# Patient Record
Sex: FEMALE | Race: WHITE | NOT HISPANIC OR LATINO | Employment: FULL TIME | URBAN - METROPOLITAN AREA
[De-identification: names, ages, dates, MRNs, and addresses within clinical notes are randomized per-mention and may not be internally consistent; named-entity substitution may affect disease eponyms.]

---

## 2017-03-03 ENCOUNTER — OFFICE VISIT (OUTPATIENT)
Dept: URGENT CARE | Facility: URGENT CARE | Age: 52
End: 2017-03-03
Payer: COMMERCIAL

## 2017-03-03 VITALS
SYSTOLIC BLOOD PRESSURE: 132 MMHG | HEART RATE: 88 BPM | TEMPERATURE: 97 F | DIASTOLIC BLOOD PRESSURE: 86 MMHG | OXYGEN SATURATION: 95 %

## 2017-03-03 DIAGNOSIS — M54.5 LEFT LOW BACK PAIN, UNSPECIFIED CHRONICITY, WITH SCIATICA PRESENCE UNSPECIFIED: Primary | ICD-10-CM

## 2017-03-03 PROCEDURE — 99213 OFFICE O/P EST LOW 20 MIN: CPT | Performed by: FAMILY MEDICINE

## 2017-03-03 RX ORDER — OXYCODONE AND ACETAMINOPHEN 5; 325 MG/1; MG/1
1 TABLET ORAL EVERY 6 HOURS PRN
Qty: 30 TABLET | Refills: 0 | Status: SHIPPED | OUTPATIENT
Start: 2017-03-03 | End: 2018-10-05

## 2017-03-03 RX ORDER — KETOROLAC TROMETHAMINE 30 MG/ML
60 INJECTION, SOLUTION INTRAMUSCULAR; INTRAVENOUS ONCE
Qty: 2 ML | Refills: 0 | OUTPATIENT
Start: 2017-03-03 | End: 2017-03-03

## 2017-03-03 RX ORDER — CYCLOBENZAPRINE HCL 10 MG
5-10 TABLET ORAL 2 TIMES DAILY PRN
Qty: 30 TABLET | Refills: 1 | Status: SHIPPED | OUTPATIENT
Start: 2017-03-03 | End: 2018-06-01

## 2017-03-03 NOTE — MR AVS SNAPSHOT
After Visit Summary   3/3/2017    Christina Guzman    MRN: [id number]           Patient Information     Date Of Birth          1965        Visit Information        Provider Department      3/3/2017 8:35 PM Mateo Rodriguez MD [facility]        Today's Diagnoses     Left low back pain, unspecified chronicity, with sciatica presence unspecified    -  1      Care Instructions      Back Care Tips    Caring for your back  These are things you can do to prevent a recurrence of acute back pain and to reduce symptoms from chronic back pain:    Maintain a healthy weight. If you are overweight, losing weight will help most types of back pain.    Exercise is an important part of recovery from most types of back pain. The back is supported by the muscles behind and in front of the spine. This means both the back muscles and the abdominal muscles must be strengthened to provide better support for your spine.     Swimming and brisk walking are good overall exercises to improve your fitness level.    Practice safe lifting methods (below).    Practice good posture when sitting, standing and walking. Avoid prolonged sitting. This puts more stress on the lower back than standing or walking.    Wear quality shoes with sufficient arch support. Foot and ankle alignment can affect back symptoms. Women should avoid high heels.    Therapeutic massage can help  relax the back muscles without stretching them.    During the first 24 to 72 hours after an acute injury or flare-up of chronic back pain, apply an ice pack to the painful area for 20 minutes and then remove it for 20 minutes over a period of 60 to 90 minutes or several times a day. As a safety precaution, do not use a heating pad at bedtime. Sleeping on a heating pad can lead to skin burns or tissue damage.    Ice and heat therapies can be alternated.  Medications  Talk to your doctor before using medications, especially if you have other medical  problems or are taking other medicines.    You may use acetaminophen or ibuprofen to control pain, unless other pain medicine was prescribed. If you have chronic conditions like diabetes, liver or kidney disease, stomach ulcers or gastrointestinal bleeding, or are taking blood thinners, talk with your doctor before taking any meidcations.    Be careful if you are given prescription pain medicines, narcotics, or medication for muscle spasm. They can cause drowsiness, affect your coordination, reflexes and judgment. Do not drive or operate heavy machinery.  Lumbar stretch  Here is a simple stretching exercise that will help relax muscle spasm and keep your back more limber. If exercise makes your back pain worse, don t do it.    Lie on your back with your knees bent and both feet on the ground.    Slowly raise your left knee to your chest as you flatten your lower back against the floor. Hold for 5 seconds.    Relax and repeat the exercise with your right knee.    Do 10 of these exercises for each leg.  Safe lifting method    Don t bend over at the waist to lift an object off the floor.  Instead, bend your knees and hips in a squat.     Keep your back and head upright    Hold the object close to your body, directly in front of you.    Straighten your legs to lift the object.     Lower the object to the floor in the reverse fashion.    If you must slide something across the floor, push it.  Posture tips  Sitting  Sit in chairs with straight backs or low-back support. rKeep your k nees lower than your hip, with your feet flat on the floor.  When driving, sit up straight. Adjust the seat forward so you are not leaning toward the steering wheel.  A small pillow or rolled towel behind your lower back may help if you are driving long distances.   Standing  When standing for long periods, shift most of your weight to one leg at a time. Alternate legs every few minutes.   Sleeping  The best way to sleep is on your side with  your knees bent. Put a low pillow under your head to support your neck in a neutral spine position. Avoid thick pillows that bend your neck to one side. Put a pillow between your legs to further relax your lower back. If you sleep on your back, put pillows under your knees to support your legs in a slightly flexed position. Use a firm mattress. If your mattress sags, replace it, or use a 1/2-inch plywood board under the mattress to add support.  Follow-up care  Follow up with your health care provider or as directed by our staff.  If X-rays, a CT scan or an MRI scan were taken, they will be reviewed by a radiologist. You will be notified of any new findings that may affect your care.  Call 911  Seek emergency medical care if any of the following occur:    Trouble breathing    Confusion    Very drowsy or trouble breathing    Fainting or loss of consciousness    Rapid or very slow heart rate    Loss of  bwel or bladder control  When to seek medical care  Call your health care provider if any of the following occur:    Pain becomes worse or spreads to your arms or legs    Weakness or numbness in one or both arms or legs    Numbness in the groin area    8895-3655 [employer]. [address] [place] [address]. All rights reserved. This information is not intended as a substitute for professional medical care. Always follow your healthcare professional's instructions.        Back Spasm (No Trauma)    Spasm of the back muscles can occur after a sudden forceful twisting or bending force (such as in a car accident), after a simple awkward movement, or after lifting something heavy with poor body positioning. In either case, muscle spasm adds to the pain. Sleeping in an awkward position or on a poor quality mattress can also cause this. Some persons respond to emotional stress by tensing the muscles of their back.  Pain that continues may require further evaluation or other types of treatment such as  physical therapy.  Unless you had a physical injury (for example, a car accident or fall), X-rays are usually not ordered for the initial evaluation of back pain. If pain continues and does not respond to medical treatment, X-rays and other tests may be performed at a later time.   Home care  1. As soon as possible, begin sitting or walking to avoid problems from prolonged bedrest (muscle weakness, worsening back stiffness and pain, blood clots in the legs).  2. When in bed, try to find a position of comfort. A firm mattress is best. Try lying flat on your back with pillows under your knees. You can also try lying on your side with your knees bent up toward your chest and a pillow between your knees.  3. Avoid prolonged sitting, long car rides or travel. This puts more stress on the lower back than standing or walking.   4. During the first 24 to 72 hours after an injury of flare-up apply an ice pack to the painful area for 20 minutes, then remove it for 20 minutes over a period of 60 to 90 minutes or several times a day. This will reduce swelling and pain. Always wrap ice packs in a thin towel.  5. You can start with ice, then switch to heat. Heat (hot shower, hot bath, or heating pad) reduces swelling and pain, and works well for muscle spasms. Heat can be applied to the painful area for 20 minutes , then remove it for 20 minutes over a period of 60 to 90 minutes or several times a day. As a safety precaution, do not sleep on a heating pad as it can burn or damage skin.  6. Ice and heat therapies can be alternated.  7. Gentle stretching will help your back heal faster. Perform this simple routine 2 to 3 times a day until your back is feeling better.     Low back stretch    Lie on your back with your knees bent and both feet on the ground.    Slowly raise your left knee to your chest as you flatten your lower back against the floor. Hold for 20 to 30 seconds.    Relax and repeat the exercise with your right  knee.    Do 2 to 3 of these exercises for each leg.    Repeat, hugging both knees to your chest at the same time.    Do not bounce, but use a gentle pull.    8. Be aware of safe lifting methods and do not lift anything over 15 pounds until all the pain is gone.  Medications  Talk to your doctor before using medications, especially if you have other medical problems or are taking other medicines.  You may use acetaminophen (such as Tylenol) or ibuprofen (such as Advil or Motrin) to control pain, unless another pain medicine was prescribed. If you have a chronic condition such as diabetes, liver or kidney disease, stomach ulcer, or gastrointestinal bleeding, or are taking blood thinners, talk with your doctor before taking any medications.  Be careful if you are given prescription pain medications, narcotics, or medication for muscle spasm. They can cause drowsiness, affect your coordination, reflexes or judgment. Do not drive or operate heavy machinery.  Follow-up care  Follow up with your doctor or this facility if your symptoms do not start to improve after one week. Physical therapy or further tests may be needed.  If X-rays were taken, they will be reviewed by a radiologist. You will be notified of any new findings that may affect your care.  Call 911  Seek emergency medica care if any of the following occur:    Trouble breathing    Confusion    Drowsiness or trouble awakening    Fainting or loss of consciousness    Rapid or very slow heart rate    Loss of bowel or bladder control  When to seek medical care  Get prompt medicat attention if any of the following occur:    Pain becomes worse or spreads to your legs    Weakness or numbness in one or both legs    Numbness in the groin or genital area    Unexplained fever over 100.4 F (38.0 C)    Burning or pain when passing urine    0399-7656 [employer]. [address] [place] [address], [place] [address]. All rights reserved. This information is not intended as  a substitute for professional medical care. Always follow your healthcare professional's instructions.        Patient Education    Oxycodone Hydrochloride, Acetaminophen Oral capsule    Oxycodone Hydrochloride, Acetaminophen Oral solution    Oxycodone Hydrochloride, Acetaminophen Oral tablet    Oxycodone Hydrochloride, Acetaminophen Oral tablet, biphasic release  Oxycodone Hydrochloride, Acetaminophen Oral tablet  What is this medicine?  ACETAMINOPHEN; OXYCODONE (a set a BETI edy fen; ox i KOE done) is a pain reliever. It is used to treat moderate to severe pain.  This medicine may be used for other purposes; ask your health care provider or pharmacist if you have questions.  What should I tell my health care provider before I take this medicine?  They need to know if you have any of these conditions:    brain tumor    Crohn's disease, inflammatory bowel disease, or ulcerative colitis    drug abuse or addiction    head injury    heart or circulation problems    if you often drink alcohol    kidney disease or problems going to the bathroom    liver disease    lung disease, asthma, or breathing problems    an unusual or allergic reaction to acetaminophen, oxycodone, other opioid analgesics, other medicines, foods, dyes, or preservatives    pregnant or trying to get pregnant    breast-feeding  How should I use this medicine?  Take this medicine by mouth with a full glass of water. Follow the directions on the prescription label. You can take it with or without food. If it upsets your stomach, take it with food. Take your medicine at regular intervals. Do not take it more often than directed.  Talk to your pediatrician regarding the use of this medicine in children. Special care may be needed.  Patients over 65 years old may have a stronger reaction and need a smaller dose.  Overdosage: If you think you have taken too much of this medicine contact a poison control center or emergency room at once.  NOTE: This medicine is  only for you. Do not share this medicine with others.  What if I miss a dose?  If you miss a dose, take it as soon as you can. If it is almost time for your next dose, take only that dose. Do not take double or extra doses.  What may interact with this medicine?    alcohol    antihistamines    barbiturates like amobarbital, butalbital, butabarbital, methohexital, pentobarbital, phenobarbital, thiopental, and secobarbital    benztropine    drugs for bladder problems like solifenacin, trospium, oxybutynin, tolterodine, hyoscyamine, and methscopolamine    drugs for breathing problems like ipratropium and tiotropium    drugs for certain stomach or intestine problems like propantheline, homatropine methylbromide, glycopyrrolate, atropine, belladonna, and dicyclomine    general anesthetics like etomidate, ketamine, nitrous oxide, propofol, desflurane, enflurane, halothane, isoflurane, and sevoflurane    medicines for depression, anxiety, or psychotic disturbances    medicines for sleep    muscle relaxants    naltrexone    narcotic medicines (opiates) for pain    phenothiazines like perphenazine, thioridazine, chlorpromazine, mesoridazine, fluphenazine, prochlorperazine, promazine, and trifluoperazine    scopolamine    tramadol    trihexyphenidyl  This list may not describe all possible interactions. Give your health care provider a list of all the medicines, herbs, non-prescription drugs, or dietary supplements you use. Also tell them if you smoke, drink alcohol, or use illegal drugs. Some items may interact with your medicine.  What should I watch for while using this medicine?  Tell your doctor or health care professional if your pain does not go away, if it gets worse, or if you have new or a different type of pain. You may develop tolerance to the medicine. Tolerance means that you will need a higher dose of the medication for pain relief. Tolerance is normal and is expected if you take this medicine for a long  time.  Do not suddenly stop taking your medicine because you may develop a severe reaction. Your body becomes used to the medicine. This does NOT mean you are addicted. Addiction is a behavior related to getting and using a drug for a non-medical reason. If you have pain, you have a medical reason to take pain medicine. Your doctor will tell you how much medicine to take. If your doctor wants you to stop the medicine, the dose will be slowly lowered over time to avoid any side effects.  You may get drowsy or dizzy. Do not drive, use machinery, or do anything that needs mental alertness until you know how this medicine affects you. Do not stand or sit up quickly, especially if you are an older patient. This reduces the risk of dizzy or fainting spells. Alcohol may interfere with the effect of this medicine. Avoid alcoholic drinks.  There are different types of narcotic medicines (opiates) for pain. If you take more than one type at the same time, you may have more side effects. Give your health care provider a list of all medicines you use. Your doctor will tell you how much medicine to take. Do not take more medicine than directed. Call emergency for help if you have problems breathing.  The medicine will cause constipation. Try to have a bowel movement at least every 2 to 3 days. If you do not have a bowel movement for 3 days, call your doctor or health care professional.  Do not take Tylenol (acetaminophen) or medicines that have acetaminophen with this medicine. Too much acetaminophen can be very dangerous. Many nonprescription medicines contain acetaminophen. Always read the labels carefully to avoid taking more acetaminophen.  What side effects may I notice from receiving this medicine?  Side effects that you should report to your doctor or health care professional as soon as possible:    allergic reactions like skin rash, itching or hives, swelling of the face, lips, or tongue    breathing difficulties,  wheezing    confusion    light headedness or fainting spells    severe stomach pain    unusually weak or tired    yellowing of the skin or the whites of the eyes  Side effects that usually do not require medical attention (report to your doctor or health care professional if they continue or are bothersome):    dizziness    drowsiness    nausea    vomiting  This list may not describe all possible side effects. Call your doctor for medical advice about side effects. You may report side effects to FDA at 3-366-NXL-3772.  Where should I keep my medicine?  Keep out of the reach of children. This medicine can be abused. Keep your medicine in a safe place to protect it from theft. Do not share this medicine with anyone. Selling or giving away this medicine is dangerous and against the law.  Store at room temperature between 20 and 25 degrees C (68 and 77 degrees F). Keep container tightly closed. Protect from light.  This medicine may cause accidental overdose and death if it is taken by other adults, children, or pets. Flush any unused medicine down the toilet to reduce the chance of harm. Do not use the medicine after the expiration date.  NOTE: This sheet is a summary. It may not cover all possible information. If you have questions about this medicine, talk to your doctor, pharmacist, or health care provider.  NOTE:This sheet is a summary. It may not cover all possible information. If you have questions about this medicine, talk to your doctor, pharmacist, or health care provider. Copyright  2016 [employer]        Patient Education    Cyclobenzaprine Hydrochloride Oral capsule, extended-release    Cyclobenzaprine Hydrochloride Oral tablet  Cyclobenzaprine Hydrochloride Oral tablet  What is this medicine?  CYCLOBENZAPRINE (sye kloe LEONARDO za preen) is a muscle relaxer. It is used to treat muscle pain, spasms, and stiffness.  This medicine may be used for other purposes; ask your health care provider or pharmacist if  you have questions.  What should I tell my health care provider before I take this medicine?  They need to know if you have any of these conditions:    heart disease, irregular heartbeat, or previous heart attack    liver disease    thyroid problem    an unusual or allergic reaction to cyclobenzaprine, tricyclic antidepressants, lactose, other medicines, foods, dyes, or preservatives    pregnant or trying to get pregnant    breast-feeding  How should I use this medicine?  Take this medicine by mouth with a glass of water. Follow the directions on the prescription label. If this medicine upsets your stomach, take it with food or milk. Take your medicine at regular intervals. Do not take it more often than directed.  Talk to your pediatrician regarding the use of this medicine in children. Special care may be needed.  Overdosage: If you think you have taken too much of this medicine contact a poison control center or emergency room at once.  NOTE: This medicine is only for you. Do not share this medicine with others.  What if I miss a dose?  If you miss a dose, take it as soon as you can. If it is almost time for your next dose, take only that dose. Do not take double or extra doses.  What may interact with this medicine?  Do not take this medicine with any of the following medications:    certain medicines for fungal infections like fluconazole, itraconazole, ketoconazole, posaconazole, voriconazole    cisapride    dofetilide    dronedarone    droperidol    flecainide    grepafloxacin    halofantrine    levomethadyl    MAOIs like Carbex, Eldepryl, Marplan, Nardil, and Parnate    nilotinib    pimozide    probucol    sertindole    thioridazine    ziprasidone  This medicine may also interact with the following medications:    abarelix    alcohol    certain medicines for cancer    certain medicines for depression, anxiety, or psychotic disturbances    certain medicines for infection like alfuzosin, chloroquine,  clarithromycin, levofloxacin, mefloquine, pentamidine, troleandomycin    certain medicines for an irregular heart beat    certain medicines used for sleep or numbness during surgery or procedure    contrast dyes    dolasetron    guanethidine    methadone    octreotide    ondansetron    other medicines that prolong the QT interval (cause an abnormal heart rhythm)    palonosetron    phenothiazines like chlorpromazine, mesoridazine, prochlorperazine, thioridazine    tramadol    vardenafil  This list may not describe all possible interactions. Give your health care provider a list of all the medicines, herbs, non-prescription drugs, or dietary supplements you use. Also tell them if you smoke, drink alcohol, or use illegal drugs. Some items may interact with your medicine.  What should I watch for while using this medicine?  Check with your doctor or health care professional if your condition does not improve within 1 to 3 weeks.  You may get drowsy or dizzy when you first start taking the medicine or change doses. Do not drive, use machinery, or do anything that may be dangerous until you know how the medicine affects you. Stand or sit up slowly.  Your mouth may get dry. Drinking water, chewing sugarless gum, or sucking on hard candy may help.  What side effects may I notice from receiving this medicine?  Side effects that you should report to your doctor or health care professional as soon as possible:    allergic reactions like skin rash, itching or hives, swelling of the face, lips, or tongue    chest pain    fast heartbeat    hallucinations    seizures    vomiting  Side effects that usually do not require medical attention (report to your doctor or health care professional if they continue or are bothersome):    headache  This list may not describe all possible side effects. Call your doctor for medical advice about side effects. You may report side effects to FDA at 2-634-FDA-4406.  Where should I keep my  medicine?  Keep out of the reach of children.  Store at room temperature between 15 and 30 degrees C (59 and 86 degrees F). Keep container tightly closed. Throw away any unused medicine after the expiration date.  NOTE:This sheet is a summary. It may not cover all possible information. If you have questions about this medicine, talk to your doctor, pharmacist, or health care provider. Copyright  2016 [employer]              Follow-ups after your visit        Who to contact     If you have questions or need follow up information about today's clinic visit or your schedule please contact [facility] [place] [facility] directly at [phone].  Normal or non-critical lab and imaging results will be communicated to you by [employer], letter or phone within 4 business days after the clinic has received the results. If you do not hear from us within 7 days, please contact the clinic through [employer] or phone. If you have a critical or abnormal lab result, we will notify you by phone as soon as possible.  Submit refill requests through [employer] or call your pharmacy and they will forward the refill request to us. Please allow 3 business days for your refill to be completed.          Additional Information About Your Visit        MyChart Information     [employer] gives you secure access to your electronic health record. If you see a primary care provider, you can also send messages to your care team and make appointments. If you have questions, please call your primary care clinic.  If you do not have a primary care provider, please call [phone] and they will assist you.        Care EveryWhere ID     This is your Care EveryWhere ID. This could be used by other organizations to access your [facility] medical records  [id number]        Your Vitals Were     Pulse Temperature Pulse Oximetry             88 97  F (36.1  C) (Oral) 95%          Blood Pressure from Last 3 Encounters:   03/03/17 132/86   01/27/16 146/85   10/14/15 115/82     Weight from Last 3 Encounters:   01/27/16 176 lb (79.8 kg)   10/14/15 179 lb (81.2 kg)   09/23/15 183 lb (83 kg)              Today, you had the following     No orders found for display         Today's Medication Changes          These changes are accurate as of: 3/3/17  9:13 PM.  If you have any questions, ask your nurse or doctor.               Start taking these medicines.        Dose/Directions    ketorolac 60 MG/2ML Soln injection   Commonly known as:  TORADOL   Used for:  Left low back pain, unspecified chronicity, with sciatica presence unspecified        Dose:  60 mg   Inject 2 mLs (60 mg) into the muscle once for 1 dose   Quantity:  2 mL   Refills:  0       oxyCODONE-acetaminophen 5-325 MG per tablet   Commonly known as:  PERCOCET   Used for:  Left low back pain, unspecified chronicity, with sciatica presence unspecified        Dose:  1 tablet   Take 1 tablet by mouth every 6 hours as needed for pain   Quantity:  30 tablet   Refills:  0         These medicines have changed or have updated prescriptions.        Dose/Directions    * cyclobenzaprine 10 MG tablet   Commonly known as:  FLEXERIL   This may have changed:  Another medication with the same name was added. Make sure you understand how and when to take each.   Used for:  LBP (low back pain)        Dose:  10 mg   Take 1 tablet (10 mg) by mouth 3 times daily as needed for muscle spasms   Quantity:  30 tablet   Refills:  0       * cyclobenzaprine 10 MG tablet   Commonly known as:  FLEXERIL   This may have changed:  You were already taking a medication with the same name, and this prescription was added. Make sure you understand how and when to take each.   Used for:  Left low back pain, unspecified chronicity, with sciatica presence unspecified        Dose:  5-10 mg   Take 0.5-1 tablets (5-10 mg) by mouth 2 times daily as needed for muscle spasms   Quantity:  30 tablet   Refills:  1       * Notice:  This list has 2 medication(s) that are the same as  other medications prescribed for you. Read the directions carefully, and ask your doctor or other care provider to review them with you.         Where to get your medicines      These medications were sent to [id number] - [place] - [facility] [address] AT [address] [place], [place] [address]     Phone:  [phone]     cyclobenzaprine 10 MG tablet         Some of these will need a paper prescription and others can be bought over the counter.  Ask your nurse if you have questions.     Bring a paper prescription for each of these medications     oxyCODONE-acetaminophen 5-325 MG per tablet       You don't need a prescription for these medications     ketorolac 60 MG/2ML Soln injection                Primary Care Provider Office Phone # Fax #    [facility] [phone] [id number] [place]  [facility] [address]        Thank you!     Thank you for choosing [facility]  for your care. Our goal is always to provide you with excellent care. Hearing back from our patients is one way we can continue to improve our services. Please take a few minutes to complete the written survey that you may receive in the mail after your visit with us. Thank you!             Your Updated Medication List - Protect others around you: Learn how to safely use, store and throw away your medicines at www.disposemymeds.org.          This list is accurate as of: 3/3/17  9:13 PM.  Always use your most recent med list.                   Brand Name Dispense Instructions for use    * cyclobenzaprine 10 MG tablet    FLEXERIL    30 tablet    Take 1 tablet (10 mg) by mouth 3 times daily as needed for muscle spasms       * cyclobenzaprine 10 MG tablet    FLEXERIL    30 tablet    Take 0.5-1 tablets (5-10 mg) by mouth 2 times daily as needed for muscle spasms       EPINEPHrine 0.3 MG/0.3ML injection     1 each    Inject 0.3 mLs (0.3 mg) into the  muscle once as needed for anaphylaxis       ketorolac 60 MG/2ML Soln injection    TORADOL    2 mL    Inject 2 mLs (60 mg) into the muscle once for 1 dose       oxyCODONE-acetaminophen 5-325 MG per tablet    PERCOCET    30 tablet    Take 1 tablet by mouth every 6 hours as needed for pain       rizatriptan 10 MG ODT tab    MAXALT-MLT    6 tablet    Take 1 tablet by mouth at onset of headache for migraine. May repeat in 2 hours if needed: max 2/day; average number of headaches monthly 1       * Notice:  This list has 2 medication(s) that are the same as other medications prescribed for you. Read the directions carefully, and ask your doctor or other care provider to review them with you.

## 2017-03-04 NOTE — PATIENT INSTRUCTIONS
Back Care Tips    Caring for your back  These are things you can do to prevent a recurrence of acute back pain and to reduce symptoms from chronic back pain:    Maintain a healthy weight. If you are overweight, losing weight will help most types of back pain.    Exercise is an important part of recovery from most types of back pain. The back is supported by the muscles behind and in front of the spine. This means both the back muscles and the abdominal muscles must be strengthened to provide better support for your spine.     Swimming and brisk walking are good overall exercises to improve your fitness level.    Practice safe lifting methods (below).    Practice good posture when sitting, standing and walking. Avoid prolonged sitting. This puts more stress on the lower back than standing or walking.    Wear quality shoes with sufficient arch support. Foot and ankle alignment can affect back symptoms. Women should avoid high heels.    Therapeutic massage can help  relax the back muscles without stretching them.    During the first 24 to 72 hours after an acute injury or flare-up of chronic back pain, apply an ice pack to the painful area for 20 minutes and then remove it for 20 minutes over a period of 60 to 90 minutes or several times a day. As a safety precaution, do not use a heating pad at bedtime. Sleeping on a heating pad can lead to skin burns or tissue damage.    Ice and heat therapies can be alternated.  Medications  Talk to your doctor before using medications, especially if you have other medical problems or are taking other medicines.    You may use acetaminophen or ibuprofen to control pain, unless other pain medicine was prescribed. If you have chronic conditions like diabetes, liver or kidney disease, stomach ulcers or gastrointestinal bleeding, or are taking blood thinners, talk with your doctor before taking any meidcations.    Be careful if you are given prescription pain medicines, narcotics, or  medication for muscle spasm. They can cause drowsiness, affect your coordination, reflexes and judgment. Do not drive or operate heavy machinery.  Lumbar stretch  Here is a simple stretching exercise that will help relax muscle spasm and keep your back more limber. If exercise makes your back pain worse, don t do it.    Lie on your back with your knees bent and both feet on the ground.    Slowly raise your left knee to your chest as you flatten your lower back against the floor. Hold for 5 seconds.    Relax and repeat the exercise with your right knee.    Do 10 of these exercises for each leg.  Safe lifting method    Don t bend over at the waist to lift an object off the floor.  Instead, bend your knees and hips in a squat.     Keep your back and head upright    Hold the object close to your body, directly in front of you.    Straighten your legs to lift the object.     Lower the object to the floor in the reverse fashion.    If you must slide something across the floor, push it.  Posture tips  Sitting  Sit in chairs with straight backs or low-back support. rKeep your k nees lower than your hip, with your feet flat on the floor.  When driving, sit up straight. Adjust the seat forward so you are not leaning toward the steering wheel.  A small pillow or rolled towel behind your lower back may help if you are driving long distances.   Standing  When standing for long periods, shift most of your weight to one leg at a time. Alternate legs every few minutes.   Sleeping  The best way to sleep is on your side with your knees bent. Put a low pillow under your head to support your neck in a neutral spine position. Avoid thick pillows that bend your neck to one side. Put a pillow between your legs to further relax your lower back. If you sleep on your back, put pillows under your knees to support your legs in a slightly flexed position. Use a firm mattress. If your mattress sags, replace it, or use a 1/2-inch plywood board  under the mattress to add support.  Follow-up care  Follow up with your health care provider or as directed by our staff.  If X-rays, a CT scan or an MRI scan were taken, they will be reviewed by a radiologist. You will be notified of any new findings that may affect your care.  Call 911  Seek emergency medical care if any of the following occur:    Trouble breathing    Confusion    Very drowsy or trouble breathing    Fainting or loss of consciousness    Rapid or very slow heart rate    Loss of  bwel or bladder control  When to seek medical care  Call your health care provider if any of the following occur:    Pain becomes worse or spreads to your arms or legs    Weakness or numbness in one or both arms or legs    Numbness in the groin area    3231-1379 [employer]. [address] [place] [address]. All rights reserved. This information is not intended as a substitute for professional medical care. Always follow your healthcare professional's instructions.        Back Spasm (No Trauma)    Spasm of the back muscles can occur after a sudden forceful twisting or bending force (such as in a car accident), after a simple awkward movement, or after lifting something heavy with poor body positioning. In either case, muscle spasm adds to the pain. Sleeping in an awkward position or on a poor quality mattress can also cause this. Some persons respond to emotional stress by tensing the muscles of their back.  Pain that continues may require further evaluation or other types of treatment such as physical therapy.  Unless you had a physical injury (for example, a car accident or fall), X-rays are usually not ordered for the initial evaluation of back pain. If pain continues and does not respond to medical treatment, X-rays and other tests may be performed at a later time.   Home care  1. As soon as possible, begin sitting or walking to avoid problems from prolonged bedrest (muscle weakness, worsening back  stiffness and pain, blood clots in the legs).  2. When in bed, try to find a position of comfort. A firm mattress is best. Try lying flat on your back with pillows under your knees. You can also try lying on your side with your knees bent up toward your chest and a pillow between your knees.  3. Avoid prolonged sitting, long car rides or travel. This puts more stress on the lower back than standing or walking.   4. During the first 24 to 72 hours after an injury of flare-up apply an ice pack to the painful area for 20 minutes, then remove it for 20 minutes over a period of 60 to 90 minutes or several times a day. This will reduce swelling and pain. Always wrap ice packs in a thin towel.  5. You can start with ice, then switch to heat. Heat (hot shower, hot bath, or heating pad) reduces swelling and pain, and works well for muscle spasms. Heat can be applied to the painful area for 20 minutes , then remove it for 20 minutes over a period of 60 to 90 minutes or several times a day. As a safety precaution, do not sleep on a heating pad as it can burn or damage skin.  6. Ice and heat therapies can be alternated.  7. Gentle stretching will help your back heal faster. Perform this simple routine 2 to 3 times a day until your back is feeling better.     Low back stretch    Lie on your back with your knees bent and both feet on the ground.    Slowly raise your left knee to your chest as you flatten your lower back against the floor. Hold for 20 to 30 seconds.    Relax and repeat the exercise with your right knee.    Do 2 to 3 of these exercises for each leg.    Repeat, hugging both knees to your chest at the same time.    Do not bounce, but use a gentle pull.    8. Be aware of safe lifting methods and do not lift anything over 15 pounds until all the pain is gone.  Medications  Talk to your doctor before using medications, especially if you have other medical problems or are taking other medicines.  You may use  acetaminophen (such as Tylenol) or ibuprofen (such as Advil or Motrin) to control pain, unless another pain medicine was prescribed. If you have a chronic condition such as diabetes, liver or kidney disease, stomach ulcer, or gastrointestinal bleeding, or are taking blood thinners, talk with your doctor before taking any medications.  Be careful if you are given prescription pain medications, narcotics, or medication for muscle spasm. They can cause drowsiness, affect your coordination, reflexes or judgment. Do not drive or operate heavy machinery.  Follow-up care  Follow up with your doctor or this facility if your symptoms do not start to improve after one week. Physical therapy or further tests may be needed.  If X-rays were taken, they will be reviewed by a radiologist. You will be notified of any new findings that may affect your care.  Call 911  Seek emergency medica care if any of the following occur:    Trouble breathing    Confusion    Drowsiness or trouble awakening    Fainting or loss of consciousness    Rapid or very slow heart rate    Loss of bowel or bladder control  When to seek medical care  Get prompt medicat attention if any of the following occur:    Pain becomes worse or spreads to your legs    Weakness or numbness in one or both legs    Numbness in the groin or genital area    Unexplained fever over 100.4 F (38.0 C)    Burning or pain when passing urine    2504-6698 [employer]. [address] [place], [place] [id number]. All rights reserved. This information is not intended as a substitute for professional medical care. Always follow your healthcare professional's instructions.        Patient Education    Oxycodone Hydrochloride, Acetaminophen Oral capsule    Oxycodone Hydrochloride, Acetaminophen Oral solution    Oxycodone Hydrochloride, Acetaminophen Oral tablet    Oxycodone Hydrochloride, Acetaminophen Oral tablet, biphasic release  Oxycodone Hydrochloride, Acetaminophen Oral  tablet  What is this medicine?  ACETAMINOPHEN; OXYCODONE (a set a BETI edy fen; ox i KOE done) is a pain reliever. It is used to treat moderate to severe pain.  This medicine may be used for other purposes; ask your health care provider or pharmacist if you have questions.  What should I tell my health care provider before I take this medicine?  They need to know if you have any of these conditions:    brain tumor    Crohn's disease, inflammatory bowel disease, or ulcerative colitis    drug abuse or addiction    head injury    heart or circulation problems    if you often drink alcohol    kidney disease or problems going to the bathroom    liver disease    lung disease, asthma, or breathing problems    an unusual or allergic reaction to acetaminophen, oxycodone, other opioid analgesics, other medicines, foods, dyes, or preservatives    pregnant or trying to get pregnant    breast-feeding  How should I use this medicine?  Take this medicine by mouth with a full glass of water. Follow the directions on the prescription label. You can take it with or without food. If it upsets your stomach, take it with food. Take your medicine at regular intervals. Do not take it more often than directed.  Talk to your pediatrician regarding the use of this medicine in children. Special care may be needed.  Patients over 65 years old may have a stronger reaction and need a smaller dose.  Overdosage: If you think you have taken too much of this medicine contact a poison control center or emergency room at once.  NOTE: This medicine is only for you. Do not share this medicine with others.  What if I miss a dose?  If you miss a dose, take it as soon as you can. If it is almost time for your next dose, take only that dose. Do not take double or extra doses.  What may interact with this medicine?    alcohol    antihistamines    barbiturates like amobarbital, butalbital, butabarbital, methohexital, pentobarbital, phenobarbital, thiopental,  and secobarbital    benztropine    drugs for bladder problems like solifenacin, trospium, oxybutynin, tolterodine, hyoscyamine, and methscopolamine    drugs for breathing problems like ipratropium and tiotropium    drugs for certain stomach or intestine problems like propantheline, homatropine methylbromide, glycopyrrolate, atropine, belladonna, and dicyclomine    general anesthetics like etomidate, ketamine, nitrous oxide, propofol, desflurane, enflurane, halothane, isoflurane, and sevoflurane    medicines for depression, anxiety, or psychotic disturbances    medicines for sleep    muscle relaxants    naltrexone    narcotic medicines (opiates) for pain    phenothiazines like perphenazine, thioridazine, chlorpromazine, mesoridazine, fluphenazine, prochlorperazine, promazine, and trifluoperazine    scopolamine    tramadol    trihexyphenidyl  This list may not describe all possible interactions. Give your health care provider a list of all the medicines, herbs, non-prescription drugs, or dietary supplements you use. Also tell them if you smoke, drink alcohol, or use illegal drugs. Some items may interact with your medicine.  What should I watch for while using this medicine?  Tell your doctor or health care professional if your pain does not go away, if it gets worse, or if you have new or a different type of pain. You may develop tolerance to the medicine. Tolerance means that you will need a higher dose of the medication for pain relief. Tolerance is normal and is expected if you take this medicine for a long time.  Do not suddenly stop taking your medicine because you may develop a severe reaction. Your body becomes used to the medicine. This does NOT mean you are addicted. Addiction is a behavior related to getting and using a drug for a non-medical reason. If you have pain, you have a medical reason to take pain medicine. Your doctor will tell you how much medicine to take. If your doctor wants you to stop the  medicine, the dose will be slowly lowered over time to avoid any side effects.  You may get drowsy or dizzy. Do not drive, use machinery, or do anything that needs mental alertness until you know how this medicine affects you. Do not stand or sit up quickly, especially if you are an older patient. This reduces the risk of dizzy or fainting spells. Alcohol may interfere with the effect of this medicine. Avoid alcoholic drinks.  There are different types of narcotic medicines (opiates) for pain. If you take more than one type at the same time, you may have more side effects. Give your health care provider a list of all medicines you use. Your doctor will tell you how much medicine to take. Do not take more medicine than directed. Call emergency for help if you have problems breathing.  The medicine will cause constipation. Try to have a bowel movement at least every 2 to 3 days. If you do not have a bowel movement for 3 days, call your doctor or health care professional.  Do not take Tylenol (acetaminophen) or medicines that have acetaminophen with this medicine. Too much acetaminophen can be very dangerous. Many nonprescription medicines contain acetaminophen. Always read the labels carefully to avoid taking more acetaminophen.  What side effects may I notice from receiving this medicine?  Side effects that you should report to your doctor or health care professional as soon as possible:    allergic reactions like skin rash, itching or hives, swelling of the face, lips, or tongue    breathing difficulties, wheezing    confusion    light headedness or fainting spells    severe stomach pain    unusually weak or tired    yellowing of the skin or the whites of the eyes  Side effects that usually do not require medical attention (report to your doctor or health care professional if they continue or are bothersome):    dizziness    drowsiness    nausea    vomiting  This list may not describe all possible side effects. Call  your doctor for medical advice about side effects. You may report side effects to FDA at 6-099-FDA-9789.  Where should I keep my medicine?  Keep out of the reach of children. This medicine can be abused. Keep your medicine in a safe place to protect it from theft. Do not share this medicine with anyone. Selling or giving away this medicine is dangerous and against the law.  Store at room temperature between 20 and 25 degrees C (68 and 77 degrees F). Keep container tightly closed. Protect from light.  This medicine may cause accidental overdose and death if it is taken by other adults, children, or pets. Flush any unused medicine down the toilet to reduce the chance of harm. Do not use the medicine after the expiration date.  NOTE: This sheet is a summary. It may not cover all possible information. If you have questions about this medicine, talk to your doctor, pharmacist, or health care provider.  NOTE:This sheet is a summary. It may not cover all possible information. If you have questions about this medicine, talk to your doctor, pharmacist, or health care provider. Copyright  2016 [employer]        Patient Education    Cyclobenzaprine Hydrochloride Oral capsule, extended-release    Cyclobenzaprine Hydrochloride Oral tablet  Cyclobenzaprine Hydrochloride Oral tablet  What is this medicine?  CYCLOBENZAPRINE (sye kloe LEONARDO za preen) is a muscle relaxer. It is used to treat muscle pain, spasms, and stiffness.  This medicine may be used for other purposes; ask your health care provider or pharmacist if you have questions.  What should I tell my health care provider before I take this medicine?  They need to know if you have any of these conditions:    heart disease, irregular heartbeat, or previous heart attack    liver disease    thyroid problem    an unusual or allergic reaction to cyclobenzaprine, tricyclic antidepressants, lactose, other medicines, foods, dyes, or preservatives    pregnant or trying to get  pregnant    breast-feeding  How should I use this medicine?  Take this medicine by mouth with a glass of water. Follow the directions on the prescription label. If this medicine upsets your stomach, take it with food or milk. Take your medicine at regular intervals. Do not take it more often than directed.  Talk to your pediatrician regarding the use of this medicine in children. Special care may be needed.  Overdosage: If you think you have taken too much of this medicine contact a poison control center or emergency room at once.  NOTE: This medicine is only for you. Do not share this medicine with others.  What if I miss a dose?  If you miss a dose, take it as soon as you can. If it is almost time for your next dose, take only that dose. Do not take double or extra doses.  What may interact with this medicine?  Do not take this medicine with any of the following medications:    certain medicines for fungal infections like fluconazole, itraconazole, ketoconazole, posaconazole, voriconazole    cisapride    dofetilide    dronedarone    droperidol    flecainide    grepafloxacin    halofantrine    levomethadyl    MAOIs like Carbex, Eldepryl, Marplan, Nardil, and Parnate    nilotinib    pimozide    probucol    sertindole    thioridazine    ziprasidone  This medicine may also interact with the following medications:    abarelix    alcohol    certain medicines for cancer    certain medicines for depression, anxiety, or psychotic disturbances    certain medicines for infection like alfuzosin, chloroquine, clarithromycin, levofloxacin, mefloquine, pentamidine, troleandomycin    certain medicines for an irregular heart beat    certain medicines used for sleep or numbness during surgery or procedure    contrast dyes    dolasetron    guanethidine    methadone    octreotide    ondansetron    other medicines that prolong the QT interval (cause an abnormal heart rhythm)    palonosetron    phenothiazines like chlorpromazine,  mesoridazine, prochlorperazine, thioridazine    tramadol    vardenafil  This list may not describe all possible interactions. Give your health care provider a list of all the medicines, herbs, non-prescription drugs, or dietary supplements you use. Also tell them if you smoke, drink alcohol, or use illegal drugs. Some items may interact with your medicine.  What should I watch for while using this medicine?  Check with your doctor or health care professional if your condition does not improve within 1 to 3 weeks.  You may get drowsy or dizzy when you first start taking the medicine or change doses. Do not drive, use machinery, or do anything that may be dangerous until you know how the medicine affects you. Stand or sit up slowly.  Your mouth may get dry. Drinking water, chewing sugarless gum, or sucking on hard candy may help.  What side effects may I notice from receiving this medicine?  Side effects that you should report to your doctor or health care professional as soon as possible:    allergic reactions like skin rash, itching or hives, swelling of the face, lips, or tongue    chest pain    fast heartbeat    hallucinations    seizures    vomiting  Side effects that usually do not require medical attention (report to your doctor or health care professional if they continue or are bothersome):    headache  This list may not describe all possible side effects. Call your doctor for medical advice about side effects. You may report side effects to FDA at 1-331-FDA-2891.  Where should I keep my medicine?  Keep out of the reach of children.  Store at room temperature between 15 and 30 degrees C (59 and 86 degrees F). Keep container tightly closed. Throw away any unused medicine after the expiration date.  NOTE:This sheet is a summary. It may not cover all possible information. If you have questions about this medicine, talk to your doctor, pharmacist, or health care provider. Copyright  2016 [employer]

## 2017-03-04 NOTE — PROGRESS NOTES
SUBJECTIVE:                                                    Christina Guzman is a 51 year old female who presents to clinic today for the following health issues:      Back Pain      Duration: Tuesday flared up        Specific cause: hx of back problems.    Description:   Location of pain: low back currently in the middle but started on left side.  Character of pain: sharp  Pain radiation:radiates into the left leg and radiates into the left foot  New numbness or weakness in legs, not attributed to pain:  YES- more numbness in the right foot x1-2 months.    Intensity: severe    History:   Pain interferes with job: YES  History of back problems: hx of back problems - bone spurs on spine, disc degen., arthritis  Any previous MRI or X-rays: Yes- at [facility].  Date 2/2010 and 1/2011  Sees a specialist for back pain:  Yes, Dr. Bart Goodwin at [facility], no contact or appointment made at this time  Therapies tried without relief: acetaminophen (Tylenol)    Alleviating factors:   Improved by: muscle relaxants      Precipitating factors:  Worsened by: Lifting, Standing, Sitting, Walking and cannot bend - has to keep moving.    Functional and Psychosocial Screen (Fernando STarT Back):      Not performed today       Accompanying Signs & Symptoms:  Risk of Fracture:  None  Risk of Cauda Equina:  None  Risk of Infection:  None  Risk of Cancer:  None  Risk of Ankylosing Spondylitis:  Onset at age <35, male, AND morning back stiffness. no            Problem list and histories reviewed & adjusted, as indicated.  Additional history: as documented    Patient Active Problem List   Diagnosis     Tobacco abuse     Chronic idiopathic urticaria     House dust mite allergy     CARDIOVASCULAR SCREENING; LDL GOAL LESS THAN 160     Migraine headache     Low back pain     Menopausal depression     Menopausal syndrome     Vitamin D deficiency     GERD (gastroesophageal reflux disease)     Toe pain     Skin tag     Past  Surgical History   Procedure Laterality Date     Toe surgery       Left foot     Orthopedic surgery  2014     bone spur shaved from left big toe       Social History   Substance Use Topics     Smoking status: Current Every Day Smoker     Packs/day: 0.50     Years: 30.00     Types: Cigarettes     Smokeless tobacco: Never Used     Alcohol use Yes      Comment: occ     Family History   Problem Relation Age of Onset     CANCER Mother      OVARIAN,  age 46,48     Respiratory Father      CANCER Father      Lung     DIABETES Maternal Grandmother      DIABETES Paternal Grandmother      DIABETES Paternal Grandfather      DIABETES Maternal Grandfather      Hypertension Father      Breast Cancer Maternal Grandmother      Other Cancer Mother      Ovarian     Obesity Father          Current Outpatient Prescriptions   Medication Sig Dispense Refill     EPINEPHrine (EPIPEN) 0.3 MG/0.3ML injection Inject 0.3 mLs (0.3 mg) into the muscle once as needed for anaphylaxis (Patient not taking: Reported on 3/3/2017) 1 each 3     cyclobenzaprine (FLEXERIL) 10 MG tablet Take 1 tablet (10 mg) by mouth 3 times daily as needed for muscle spasms (Patient not taking: Reported on 3/3/2017) 30 tablet 0     rizatriptan (MAXALT-MLT) 10 MG disintegrating tablet Take 1 tablet by mouth at onset of headache for migraine. May repeat in 2 hours if needed: max 2/day; average number of headaches monthly 1 (Patient not taking: Reported on 3/3/2017) 6 tablet 11     Allergies   Allergen Reactions     Clindamycin      Nkda [No Known Drug Allergies]      Recent Labs   Lab Test  10/14/15   0831  13   1040  12   1829  12   1004   09/07/10   1116   LDL  184*  162*   --   196*   < >   --    HDL  32*  38*   --   37*   < >   --    TRIG  204*  247*   --   169*   < >   --    ALT   --    --    --    --    --   18   CR   --    --    --    --    --   0.67   GFRESTIMATED   --    --    --    --    --   >90   GFRESTBLACK   --    --    --    --    --    >90   POTASSIUM   --    --    --    --    --   4.4   TSH   --    --   3.37   --    --   1.41    < > = values in this interval not displayed.      BP Readings from Last 3 Encounters:   03/03/17 132/86   01/27/16 146/85   10/14/15 115/82    Wt Readings from Last 3 Encounters:   01/27/16 176 lb (79.8 kg)   10/14/15 179 lb (81.2 kg)   09/23/15 183 lb (83 kg)                  Labs reviewed in EPIC    ROS:  Constitutional, HEENT, cardiovascular, pulmonary, gi and gu systems are negative, except as otherwise noted.    OBJECTIVE:                                                    /86  Pulse 88  Temp 97  F (36.1  C) (Oral)  SpO2 95%  There is no height or weight on file to calculate BMI.  GENERAL: healthy, alert and no distress  NECK: no adenopathy, no asymmetry, masses, or scars and thyroid normal to palpation  RESP: lungs clear to auscultation - no rales, rhonchi or wheezes  CV: regular rate and rhythm, normal S1 S2, no S3 or S4, no murmur, click or rub, no peripheral edema and peripheral pulses strong  ABDOMEN: soft, nontender, no hepatosplenomegaly, no masses and bowel sounds normal  MS: spine exam shows: unable to sit due to lumbar pain, mild left paraspinal tenderness, SLR equivocal, reflexes 2+, strength normal bilaterally, tone normal   NEURO: grossly intact        ASSESSMENT/PLAN:                                                          ICD-10-CM    1. Left low back pain, unspecified chronicity, with sciatica presence unspecified M54.5 cyclobenzaprine (FLEXERIL) 10 MG tablet     oxyCODONE-acetaminophen (PERCOCET) 5-325 MG per tablet     ketorolac (TORADOL) 60 MG/2ML SOLN injection       51 year old female presents with severe lower lumbar back pain. Toradol injection administered in office today. Offered her to transfer to ER for further evaluation but would like to wait for now, Red flag symptoms explained in detail. Percocet and flexeril prescribed, common side effects discussed including sedation. Suggested  well hydration, heating massage and OTC analgesia. Follow up with PCP for consideration of getting an MRI. Patient understood and in agreement with the above plan. All questions are answered.       MEDICATIONS:   Orders Placed This Encounter   Medications     cyclobenzaprine (FLEXERIL) 10 MG tablet     Sig: Take 0.5-1 tablets (5-10 mg) by mouth 2 times daily as needed for muscle spasms     Dispense:  30 tablet     Refill:  1     oxyCODONE-acetaminophen (PERCOCET) 5-325 MG per tablet     Sig: Take 1 tablet by mouth every 6 hours as needed for pain     Dispense:  30 tablet     Refill:  0     ketorolac (TORADOL) 60 MG/2ML SOLN injection     Sig: Inject 2 mLs (60 mg) into the muscle once for 1 dose     Dispense:  2 mL     Refill:  0           Patient Instructions     Back Care Tips    Caring for your back  These are things you can do to prevent a recurrence of acute back pain and to reduce symptoms from chronic back pain:    Maintain a healthy weight. If you are overweight, losing weight will help most types of back pain.    Exercise is an important part of recovery from most types of back pain. The back is supported by the muscles behind and in front of the spine. This means both the back muscles and the abdominal muscles must be strengthened to provide better support for your spine.     Swimming and brisk walking are good overall exercises to improve your fitness level.    Practice safe lifting methods (below).    Practice good posture when sitting, standing and walking. Avoid prolonged sitting. This puts more stress on the lower back than standing or walking.    Wear quality shoes with sufficient arch support. Foot and ankle alignment can affect back symptoms. Women should avoid high heels.    Therapeutic massage can help  relax the back muscles without stretching them.    During the first 24 to 72 hours after an acute injury or flare-up of chronic back pain, apply an ice pack to the painful area for 20 minutes and  then remove it for 20 minutes over a period of 60 to 90 minutes or several times a day. As a safety precaution, do not use a heating pad at bedtime. Sleeping on a heating pad can lead to skin burns or tissue damage.    Ice and heat therapies can be alternated.  Medications  Talk to your doctor before using medications, especially if you have other medical problems or are taking other medicines.    You may use acetaminophen or ibuprofen to control pain, unless other pain medicine was prescribed. If you have chronic conditions like diabetes, liver or kidney disease, stomach ulcers or gastrointestinal bleeding, or are taking blood thinners, talk with your doctor before taking any meidcations.    Be careful if you are given prescription pain medicines, narcotics, or medication for muscle spasm. They can cause drowsiness, affect your coordination, reflexes and judgment. Do not drive or operate heavy machinery.  Lumbar stretch  Here is a simple stretching exercise that will help relax muscle spasm and keep your back more limber. If exercise makes your back pain worse, don t do it.    Lie on your back with your knees bent and both feet on the ground.    Slowly raise your left knee to your chest as you flatten your lower back against the floor. Hold for 5 seconds.    Relax and repeat the exercise with your right knee.    Do 10 of these exercises for each leg.  Safe lifting method    Don t bend over at the waist to lift an object off the floor.  Instead, bend your knees and hips in a squat.     Keep your back and head upright    Hold the object close to your body, directly in front of you.    Straighten your legs to lift the object.     Lower the object to the floor in the reverse fashion.    If you must slide something across the floor, push it.  Posture tips  Sitting  Sit in chairs with straight backs or low-back support. rKeep your k nees lower than your hip, with your feet flat on the floor.  When driving, sit up  straight. Adjust the seat forward so you are not leaning toward the steering wheel.  A small pillow or rolled towel behind your lower back may help if you are driving long distances.   Standing  When standing for long periods, shift most of your weight to one leg at a time. Alternate legs every few minutes.   Sleeping  The best way to sleep is on your side with your knees bent. Put a low pillow under your head to support your neck in a neutral spine position. Avoid thick pillows that bend your neck to one side. Put a pillow between your legs to further relax your lower back. If you sleep on your back, put pillows under your knees to support your legs in a slightly flexed position. Use a firm mattress. If your mattress sags, replace it, or use a 1/2-inch plywood board under the mattress to add support.  Follow-up care  Follow up with your health care provider or as directed by our staff.  If X-rays, a CT scan or an MRI scan were taken, they will be reviewed by a radiologist. You will be notified of any new findings that may affect your care.  Call 911  Seek emergency medical care if any of the following occur:    Trouble breathing    Confusion    Very drowsy or trouble breathing    Fainting or loss of consciousness    Rapid or very slow heart rate    Loss of  bwel or bladder control  When to seek medical care  Call your health care provider if any of the following occur:    Pain becomes worse or spreads to your arms or legs    Weakness or numbness in one or both arms or legs    Numbness in the groin area    2658-5519 [employer]. [address] [place] [address], [place] [id number]. All rights reserved. This information is not intended as a substitute for professional medical care. Always follow your healthcare professional's instructions.        Back Spasm (No Trauma)    Spasm of the back muscles can occur after a sudden forceful twisting or bending force (such as in a car accident), after a simple awkward  movement, or after lifting something heavy with poor body positioning. In either case, muscle spasm adds to the pain. Sleeping in an awkward position or on a poor quality mattress can also cause this. Some persons respond to emotional stress by tensing the muscles of their back.  Pain that continues may require further evaluation or other types of treatment such as physical therapy.  Unless you had a physical injury (for example, a car accident or fall), X-rays are usually not ordered for the initial evaluation of back pain. If pain continues and does not respond to medical treatment, X-rays and other tests may be performed at a later time.   Home care  1. As soon as possible, begin sitting or walking to avoid problems from prolonged bedrest (muscle weakness, worsening back stiffness and pain, blood clots in the legs).  2. When in bed, try to find a position of comfort. A firm mattress is best. Try lying flat on your back with pillows under your knees. You can also try lying on your side with your knees bent up toward your chest and a pillow between your knees.  3. Avoid prolonged sitting, long car rides or travel. This puts more stress on the lower back than standing or walking.   4. During the first 24 to 72 hours after an injury of flare-up apply an ice pack to the painful area for 20 minutes, then remove it for 20 minutes over a period of 60 to 90 minutes or several times a day. This will reduce swelling and pain. Always wrap ice packs in a thin towel.  5. You can start with ice, then switch to heat. Heat (hot shower, hot bath, or heating pad) reduces swelling and pain, and works well for muscle spasms. Heat can be applied to the painful area for 20 minutes , then remove it for 20 minutes over a period of 60 to 90 minutes or several times a day. As a safety precaution, do not sleep on a heating pad as it can burn or damage skin.  6. Ice and heat therapies can be alternated.  7. Gentle stretching will help your  back heal faster. Perform this simple routine 2 to 3 times a day until your back is feeling better.     Low back stretch    Lie on your back with your knees bent and both feet on the ground.    Slowly raise your left knee to your chest as you flatten your lower back against the floor. Hold for 20 to 30 seconds.    Relax and repeat the exercise with your right knee.    Do 2 to 3 of these exercises for each leg.    Repeat, hugging both knees to your chest at the same time.    Do not bounce, but use a gentle pull.    8. Be aware of safe lifting methods and do not lift anything over 15 pounds until all the pain is gone.  Medications  Talk to your doctor before using medications, especially if you have other medical problems or are taking other medicines.  You may use acetaminophen (such as Tylenol) or ibuprofen (such as Advil or Motrin) to control pain, unless another pain medicine was prescribed. If you have a chronic condition such as diabetes, liver or kidney disease, stomach ulcer, or gastrointestinal bleeding, or are taking blood thinners, talk with your doctor before taking any medications.  Be careful if you are given prescription pain medications, narcotics, or medication for muscle spasm. They can cause drowsiness, affect your coordination, reflexes or judgment. Do not drive or operate heavy machinery.  Follow-up care  Follow up with your doctor or this facility if your symptoms do not start to improve after one week. Physical therapy or further tests may be needed.  If X-rays were taken, they will be reviewed by a radiologist. You will be notified of any new findings that may affect your care.  Call 911  Seek emergency medica care if any of the following occur:    Trouble breathing    Confusion    Drowsiness or trouble awakening    Fainting or loss of consciousness    Rapid or very slow heart rate    Loss of bowel or bladder control  When to seek medical care  Get prompt medicat attention if any of the  following occur:    Pain becomes worse or spreads to your legs    Weakness or numbness in one or both legs    Numbness in the groin or genital area    Unexplained fever over 100.4 F (38.0 C)    Burning or pain when passing urine    4546-0931 [employer]. [address] [place] [id number]. All rights reserved. This information is not intended as a substitute for professional medical care. Always follow your healthcare professional's instructions.        Patient Education    Oxycodone Hydrochloride, Acetaminophen Oral capsule    Oxycodone Hydrochloride, Acetaminophen Oral solution    Oxycodone Hydrochloride, Acetaminophen Oral tablet    Oxycodone Hydrochloride, Acetaminophen Oral tablet, biphasic release  Oxycodone Hydrochloride, Acetaminophen Oral tablet  What is this medicine?  ACETAMINOPHEN; OXYCODONE (a set a BETI edy fen; ox i KOE done) is a pain reliever. It is used to treat moderate to severe pain.  This medicine may be used for other purposes; ask your health care provider or pharmacist if you have questions.  What should I tell my health care provider before I take this medicine?  They need to know if you have any of these conditions:    brain tumor    Crohn's disease, inflammatory bowel disease, or ulcerative colitis    drug abuse or addiction    head injury    heart or circulation problems    if you often drink alcohol    kidney disease or problems going to the bathroom    liver disease    lung disease, asthma, or breathing problems    an unusual or allergic reaction to acetaminophen, oxycodone, other opioid analgesics, other medicines, foods, dyes, or preservatives    pregnant or trying to get pregnant    breast-feeding  How should I use this medicine?  Take this medicine by mouth with a full glass of water. Follow the directions on the prescription label. You can take it with or without food. If it upsets your stomach, take it with food. Take your medicine at regular intervals. Do not  take it more often than directed.  Talk to your pediatrician regarding the use of this medicine in children. Special care may be needed.  Patients over 65 years old may have a stronger reaction and need a smaller dose.  Overdosage: If you think you have taken too much of this medicine contact a poison control center or emergency room at once.  NOTE: This medicine is only for you. Do not share this medicine with others.  What if I miss a dose?  If you miss a dose, take it as soon as you can. If it is almost time for your next dose, take only that dose. Do not take double or extra doses.  What may interact with this medicine?    alcohol    antihistamines    barbiturates like amobarbital, butalbital, butabarbital, methohexital, pentobarbital, phenobarbital, thiopental, and secobarbital    benztropine    drugs for bladder problems like solifenacin, trospium, oxybutynin, tolterodine, hyoscyamine, and methscopolamine    drugs for breathing problems like ipratropium and tiotropium    drugs for certain stomach or intestine problems like propantheline, homatropine methylbromide, glycopyrrolate, atropine, belladonna, and dicyclomine    general anesthetics like etomidate, ketamine, nitrous oxide, propofol, desflurane, enflurane, halothane, isoflurane, and sevoflurane    medicines for depression, anxiety, or psychotic disturbances    medicines for sleep    muscle relaxants    naltrexone    narcotic medicines (opiates) for pain    phenothiazines like perphenazine, thioridazine, chlorpromazine, mesoridazine, fluphenazine, prochlorperazine, promazine, and trifluoperazine    scopolamine    tramadol    trihexyphenidyl  This list may not describe all possible interactions. Give your health care provider a list of all the medicines, herbs, non-prescription drugs, or dietary supplements you use. Also tell them if you smoke, drink alcohol, or use illegal drugs. Some items may interact with your medicine.  What should I watch for while  using this medicine?  Tell your doctor or health care professional if your pain does not go away, if it gets worse, or if you have new or a different type of pain. You may develop tolerance to the medicine. Tolerance means that you will need a higher dose of the medication for pain relief. Tolerance is normal and is expected if you take this medicine for a long time.  Do not suddenly stop taking your medicine because you may develop a severe reaction. Your body becomes used to the medicine. This does NOT mean you are addicted. Addiction is a behavior related to getting and using a drug for a non-medical reason. If you have pain, you have a medical reason to take pain medicine. Your doctor will tell you how much medicine to take. If your doctor wants you to stop the medicine, the dose will be slowly lowered over time to avoid any side effects.  You may get drowsy or dizzy. Do not drive, use machinery, or do anything that needs mental alertness until you know how this medicine affects you. Do not stand or sit up quickly, especially if you are an older patient. This reduces the risk of dizzy or fainting spells. Alcohol may interfere with the effect of this medicine. Avoid alcoholic drinks.  There are different types of narcotic medicines (opiates) for pain. If you take more than one type at the same time, you may have more side effects. Give your health care provider a list of all medicines you use. Your doctor will tell you how much medicine to take. Do not take more medicine than directed. Call emergency for help if you have problems breathing.  The medicine will cause constipation. Try to have a bowel movement at least every 2 to 3 days. If you do not have a bowel movement for 3 days, call your doctor or health care professional.  Do not take Tylenol (acetaminophen) or medicines that have acetaminophen with this medicine. Too much acetaminophen can be very dangerous. Many nonprescription medicines contain  acetaminophen. Always read the labels carefully to avoid taking more acetaminophen.  What side effects may I notice from receiving this medicine?  Side effects that you should report to your doctor or health care professional as soon as possible:    allergic reactions like skin rash, itching or hives, swelling of the face, lips, or tongue    breathing difficulties, wheezing    confusion    light headedness or fainting spells    severe stomach pain    unusually weak or tired    yellowing of the skin or the whites of the eyes  Side effects that usually do not require medical attention (report to your doctor or health care professional if they continue or are bothersome):    dizziness    drowsiness    nausea    vomiting  This list may not describe all possible side effects. Call your doctor for medical advice about side effects. You may report side effects to FDA at 8-888-FDA-6586.  Where should I keep my medicine?  Keep out of the reach of children. This medicine can be abused. Keep your medicine in a safe place to protect it from theft. Do not share this medicine with anyone. Selling or giving away this medicine is dangerous and against the law.  Store at room temperature between 20 and 25 degrees C (68 and 77 degrees F). Keep container tightly closed. Protect from light.  This medicine may cause accidental overdose and death if it is taken by other adults, children, or pets. Flush any unused medicine down the toilet to reduce the chance of harm. Do not use the medicine after the expiration date.  NOTE: This sheet is a summary. It may not cover all possible information. If you have questions about this medicine, talk to your doctor, pharmacist, or health care provider.  NOTE:This sheet is a summary. It may not cover all possible information. If you have questions about this medicine, talk to your doctor, pharmacist, or health care provider. Copyright  2016 [employer]        Patient Education    Cyclobenzaprine  Hydrochloride Oral capsule, extended-release    Cyclobenzaprine Hydrochloride Oral tablet  Cyclobenzaprine Hydrochloride Oral tablet  What is this medicine?  CYCLOBENZAPRINE (sye kloe LEONARDO za preen) is a muscle relaxer. It is used to treat muscle pain, spasms, and stiffness.  This medicine may be used for other purposes; ask your health care provider or pharmacist if you have questions.  What should I tell my health care provider before I take this medicine?  They need to know if you have any of these conditions:    heart disease, irregular heartbeat, or previous heart attack    liver disease    thyroid problem    an unusual or allergic reaction to cyclobenzaprine, tricyclic antidepressants, lactose, other medicines, foods, dyes, or preservatives    pregnant or trying to get pregnant    breast-feeding  How should I use this medicine?  Take this medicine by mouth with a glass of water. Follow the directions on the prescription label. If this medicine upsets your stomach, take it with food or milk. Take your medicine at regular intervals. Do not take it more often than directed.  Talk to your pediatrician regarding the use of this medicine in children. Special care may be needed.  Overdosage: If you think you have taken too much of this medicine contact a poison control center or emergency room at once.  NOTE: This medicine is only for you. Do not share this medicine with others.  What if I miss a dose?  If you miss a dose, take it as soon as you can. If it is almost time for your next dose, take only that dose. Do not take double or extra doses.  What may interact with this medicine?  Do not take this medicine with any of the following medications:    certain medicines for fungal infections like fluconazole, itraconazole, ketoconazole, posaconazole, voriconazole    cisapride    dofetilide    dronedarone    droperidol    flecainide    grepafloxacin    halofantrine    levomethadyl    MAOIs like Carbex, Eldepryl,  Marplan, Nardil, and Parnate    nilotinib    pimozide    probucol    sertindole    thioridazine    ziprasidone  This medicine may also interact with the following medications:    abarelix    alcohol    certain medicines for cancer    certain medicines for depression, anxiety, or psychotic disturbances    certain medicines for infection like alfuzosin, chloroquine, clarithromycin, levofloxacin, mefloquine, pentamidine, troleandomycin    certain medicines for an irregular heart beat    certain medicines used for sleep or numbness during surgery or procedure    contrast dyes    dolasetron    guanethidine    methadone    octreotide    ondansetron    other medicines that prolong the QT interval (cause an abnormal heart rhythm)    palonosetron    phenothiazines like chlorpromazine, mesoridazine, prochlorperazine, thioridazine    tramadol    vardenafil  This list may not describe all possible interactions. Give your health care provider a list of all the medicines, herbs, non-prescription drugs, or dietary supplements you use. Also tell them if you smoke, drink alcohol, or use illegal drugs. Some items may interact with your medicine.  What should I watch for while using this medicine?  Check with your doctor or health care professional if your condition does not improve within 1 to 3 weeks.  You may get drowsy or dizzy when you first start taking the medicine or change doses. Do not drive, use machinery, or do anything that may be dangerous until you know how the medicine affects you. Stand or sit up slowly.  Your mouth may get dry. Drinking water, chewing sugarless gum, or sucking on hard candy may help.  What side effects may I notice from receiving this medicine?  Side effects that you should report to your doctor or health care professional as soon as possible:    allergic reactions like skin rash, itching or hives, swelling of the face, lips, or tongue    chest pain    fast  heartbeat    hallucinations    seizures    vomiting  Side effects that usually do not require medical attention (report to your doctor or health care professional if they continue or are bothersome):    headache  This list may not describe all possible side effects. Call your doctor for medical advice about side effects. You may report side effects to FDA at 6-597-BZZ-6333.  Where should I keep my medicine?  Keep out of the reach of children.  Store at room temperature between 15 and 30 degrees C (59 and 86 degrees F). Keep container tightly closed. Throw away any unused medicine after the expiration date.  NOTE:This sheet is a summary. It may not cover all possible information. If you have questions about this medicine, talk to your doctor, pharmacist, or health care provider. Copyright  2016 Gold Standard            Mateo Rodriguez MD  [facility]

## 2017-03-04 NOTE — NURSING NOTE
MEDICATION: Toradol 60 mg  ROUTE: IM  SITE: [facility]  DOSE: 2 mL  LOT #: [id number]  :  Pipeline  EXPIRATION DATE:  10/30/2017  NDC#: 71541-187-07  Madison MANCUSO CMA ([facility])  9:12 PM 3/3/2017

## 2017-03-04 NOTE — NURSING NOTE
"Chief Complaint   Patient presents with     Back Pain       Initial /86  Pulse 88  Temp 97  F (36.1  C) (Oral)  SpO2 95% Estimated body mass index is 30.21 kg/(m^2) as calculated from the following:    Height as of 1/27/16: 5' 4\" (1.626 m).    Weight as of 1/27/16: 176 lb (79.8 kg).  Medication Reconciliation: complete   Candis Varela CMA      "

## 2017-03-27 ENCOUNTER — OFFICE VISIT (OUTPATIENT)
Dept: ALLERGY | Facility: CLINIC | Age: 52
End: 2017-03-27
Payer: COMMERCIAL

## 2017-03-27 VITALS
DIASTOLIC BLOOD PRESSURE: 89 MMHG | HEART RATE: 85 BPM | BODY MASS INDEX: 32.81 KG/M2 | OXYGEN SATURATION: 98 % | HEIGHT: 64 IN | SYSTOLIC BLOOD PRESSURE: 134 MMHG | WEIGHT: 192.2 LBS

## 2017-03-27 DIAGNOSIS — T63.481A ALLERGIC REACTION TO INSECT STING, ACCIDENTAL OR UNINTENTIONAL, INITIAL ENCOUNTER: Primary | ICD-10-CM

## 2017-03-27 DIAGNOSIS — T78.40XA ALLERGIC REACTION, INITIAL ENCOUNTER: ICD-10-CM

## 2017-03-27 PROCEDURE — 83520 IMMUNOASSAY QUANT NOS NONAB: CPT | Mod: 90 | Performed by: ALLERGY & IMMUNOLOGY

## 2017-03-27 PROCEDURE — 36415 COLL VENOUS BLD VENIPUNCTURE: CPT | Performed by: ALLERGY & IMMUNOLOGY

## 2017-03-27 PROCEDURE — 99000 SPECIMEN HANDLING OFFICE-LAB: CPT | Performed by: ALLERGY & IMMUNOLOGY

## 2017-03-27 PROCEDURE — 99203 OFFICE O/P NEW LOW 30 MIN: CPT | Performed by: ALLERGY & IMMUNOLOGY

## 2017-03-27 NOTE — PROGRESS NOTES
Christina Guzman was seen in the Allergy Clinic at [facility]. The following are my recommendations regarding her Stinging Insect Allergy and Allergic Reaction    1. Will obtain tryptase level  2. Use epinephrine auto-injector as directed in case of future allergic reactions  3. Return to clinic for venom testing    Christina Guzman is a 51 year old White female being seen today in consultation for possible bee allergies. Rosalie is planing to start raising bees but is concerned she may be allergic because of a reaction she has as a child. Around 9 years of age she was stung but a bunch of insects. She was told they were bees but is not exactly sure. She immediately developed difficulty breathing and was taken to the ED and treated. She has never been further evaluated for this reaction and has not been stung since though she is very careful to avoid stinging insects.    About one year ago Rosalie had another allergic reaction but this time the cause was unknown. She was getting ready for bed, blew out a candle, and 5 minutes later developed lip and eye swelling and difficulty breathing. She took some benadryl and her symptoms improved and she went to the clinic the following morning. Her symptoms resolved over 24 hours and she was prescribed an epinephrine auto-injector at that time. She has not had any other subsequent reactions. Several years ago Rosalie states she had chronic hives that lasted for several months. These symptoms resolved but she continues to get a few hives intermittently but they resolve on their own within a few hours.      Past Medical History:   Diagnosis Date     Chronic idiopathic urticaria x     IgE 638, Tryptase=11 (min. elevated)     Fructose disorder     Fructose Malabsorbtion     House dust mite allergy 9/7/10 RAST    DM only     Menopausal depression 2012     Migraines      Family History   Problem Relation Age of Onset     CANCER Mother      OVARIAN,  age  46,48     Respiratory Father      CANCER Father      Lung     DIABETES Maternal Grandmother      DIABETES Paternal Grandmother      DIABETES Paternal Grandfather      DIABETES Maternal Grandfather      Hypertension Father      Breast Cancer Maternal Grandmother      Other Cancer Mother      Ovarian     Obesity Father      Past Surgical History:   Procedure Laterality Date     ORTHOPEDIC SURGERY  12/2014    bone spur shaved from left big toe     TOE SURGERY      Left foot       ENVIRONMENTAL HISTORY: [place]. The home is heated with a gas furnace. They does have central air conditioning. The patient's bedroom is furnished with feather/wool bedding or pillows, carpeting in bedroom and fabric window coverings.  Pets inside the house include 2 dog(s). There is not history of cockroach or mice infestation. There is/are 2 smokers in the house.  The house does not have a damp basement.     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 She has missed 0 days of school/work. She lives with her spouse, son and daughter.  Her spouse works as a .     REVIEW OF SYSTEMS:  General: negative for weight gain. negative for weight loss. negative for changes in sleep.   Eyes: positive  for itching. negative for redness. positive  for tearing/watering.  Ears: negative for fullness. negative for hearing loss. negative for dizziness.   Nose: negative for snoring.negative for changes in smell. negative for drainage.   Throat: negative for hoarseness. negative for sore throat. negative for trouble swallowing.   Lungs: negative for shortness of breath.negative for wheezing. negative for sputum production.   Cardiovascular: negative for chest pain. negative for swelling of ankles. negative for fast or irregular heartbeat.   Gastrointestinal: negative for nausea. negative for heartburn. positive  for acid reflux.   Musculoskeletal: negative for joint pain. negative for joint  "stiffness. negative for joint swelling.   Neurologic: negative for seizures. negative for fainting. negative for weakness.   Psychiatric: negative for changes in mood. negative for anxiety.   Endocrine: negative for cold intolerance. negative for heat intolerance. negative for tremors.   Hematologic: positive  for easy bruising. positive  for easy bleeding.  Integumentary: negative for rash. negative for scaling. negative for nail changes.       Current Outpatient Prescriptions:      cyclobenzaprine (FLEXERIL) 10 MG tablet, Take 0.5-1 tablets (5-10 mg) by mouth 2 times daily as needed for muscle spasms, Disp: 30 tablet, Rfl: 1     EPINEPHrine (EPIPEN) 0.3 MG/0.3ML injection, Inject 0.3 mLs (0.3 mg) into the muscle once as needed for anaphylaxis, Disp: 1 each, Rfl: 3     cyclobenzaprine (FLEXERIL) 10 MG tablet, Take 1 tablet (10 mg) by mouth 3 times daily as needed for muscle spasms, Disp: 30 tablet, Rfl: 0     oxyCODONE-acetaminophen (PERCOCET) 5-325 MG per tablet, Take 1 tablet by mouth every 6 hours as needed for pain (Patient not taking: Reported on 3/27/2017), Disp: 30 tablet, Rfl: 0     rizatriptan (MAXALT-MLT) 10 MG disintegrating tablet, Take 1 tablet by mouth at onset of headache for migraine. May repeat in 2 hours if needed: max 2/day; average number of headaches monthly 1 (Patient not taking: Reported on 3/3/2017), Disp: 6 tablet, Rfl: 11  Immunization History   Administered Date(s) Administered     Influenza (IIV3) 09/22/2009, 10/11/2010, 12/11/2012     Influenza Vaccine IM 3yrs+ 4 Valent IIV4 09/23/2015     TD (ADULT, 7+) 05/25/2007     TDAP Vaccine (Adacel) 03/23/2012     Allergies   Allergen Reactions     Clindamycin      Nkda [No Known Drug Allergies]          EXAM:   /89  Pulse 85  Ht 1.626 m (5' 4\")  Wt 87.2 kg (192 lb 3.2 oz)  SpO2 98%  BMI 32.99 kg/m2  GENERAL APPEARANCE: alert, cooperative and not in distress  SKIN: no rashes, no lesions  HEAD: atraumatic, normocephalic  EYES: lids and " lashes normal, conjunctivae and sclerae clear, pupils equal, round, reactive to light, EOM full and intact  ENT: no scars or lesions, nasal exam showed no discharge, swelling or lesions noted, otoscopy showed external auditory canals clear, tympanic membranes normal, tongue midline and normal, soft palate, uvula, and tonsils normal  NECK: no asymmetry, masses, or scars, supple without significant adenopathy  LUNGS: unlabored respirations, no intercostal retractions or accessory muscle use, clear to auscultation without rales or wheezes  HEART: regular rate and rhythm without murmurs and normal S1 and S2  MUSCULOSKELETAL: no musculoskeletal defects are noted  NEURO: no focal deficits noted, mental status intact  PSYCH: does not appear depressed or anxious and short and long term memory appears intact    WORKUP: None    ASSESSMENT/PLAN:  Christina Guzman is a 51 year old female here for evaluation of possible stinging insect allergy. It is unclear whether her previous reaction was due to a true IgE mediated allergy to stinging insects or if it may have been a toxic reaction as she was stung by several insects. We discussed the potential risks of future reactions to stinging insects if she is indeed allergic and the risk reduction that could be achieved with venom immunotherapy. She also has a history of an unexplained allergic reaction that occurred about 1 year ago. She was advised that if future symptoms develop she is to immediately use her epinephrine auto-injector and seek evaluation in the ED.    1. Will obtain tryptase level  2. Use epinephrine auto-injector as directed in case of future allergic reactions  3. Return to clinic for venom testing      Holger Uribe MD  Allergy/Immunology  [facility] and [place], [place]      Chart documentation done in part with Dragon Voice Recognition Software. Although reviewed after completion, some word and grammatical errors may remain.

## 2017-03-27 NOTE — PATIENT INSTRUCTIONS
If you have any questions regarding your allergies, asthma, or what we discussed during your visit today please call the allergy clinic or contact us via [email or web address].    [facility]: [phone]      We will contact you to schedule an appointment for testing to stinging allergies

## 2017-03-27 NOTE — PROGRESS NOTES
Please send venom testing kit to [facility]. Appointment scheduled for 4/4/17 at 8AM.

## 2017-03-27 NOTE — NURSING NOTE
"Chief Complaint   Patient presents with     Consult     pt has allergic reactions to clindamycin, she had a reaction to something she had problem with breathing and her lips blew up. She was stuck by bees when she younger, and had allergic reaction, she wants to raise honey bees. PT wants to know if she is allergic to bees.        Initial /89  Pulse 85  Ht 1.626 m (5' 4\")  Wt 87.2 kg (192 lb 3.2 oz)  SpO2 98%  BMI 32.99 kg/m2 Estimated body mass index is 32.99 kg/(m^2) as calculated from the following:    Height as of this encounter: 1.626 m (5' 4\").    Weight as of this encounter: 87.2 kg (192 lb 3.2 oz).  Medication Reconciliation: complete   Dayanna Garcia MA      "

## 2017-03-27 NOTE — MR AVS SNAPSHOT
After Visit Summary   3/27/2017    Christina Guzman    MRN: [id number]           Patient Information     Date Of Birth          1965        Visit Information        Provider Department      3/27/2017 1:00 PM Holger Uribe MD [facility]        Today's Diagnoses     Allergic reaction, initial encounter    -  1      Care Instructions    If you have any questions regarding your allergies, asthma, or what we discussed during your visit today please call the allergy clinic or contact us via [employer]    [facility]: [phone]      We will contact you to schedule an appointment for testing to stinging allergies        Follow-ups after your visit        Who to contact     If you have questions or need follow up information about today's clinic visit or your schedule please contact [facility] directly at [phone].  Normal or non-critical lab and imaging results will be communicated to you by [employer], letter or phone within 4 business days after the clinic has received the results. If you do not hear from us within 7 days, please contact the clinic through [employer] or phone. If you have a critical or abnormal lab result, we will notify you by phone as soon as possible.  Submit refill requests through [employer] or call your pharmacy and they will forward the refill request to us. Please allow 3 business days for your refill to be completed.          Additional Information About Your Visit        [employer] Information     [employer] gives you secure access to your electronic health record. If you see a primary care provider, you can also send messages to your care team and make appointments. If you have questions, please call your primary care clinic.  If you do not have a primary care provider, please call [phone] and they will assist you.        Care EveryWhere ID     This is your Care EveryWhere ID. This could be used by other organizations to access your  "[facility] medical records  [id number]        Your Vitals Were     Pulse Height Pulse Oximetry BMI (Body Mass Index)          85 1.626 m (5' 4\") 98% 32.99 kg/m2         Blood Pressure from Last 3 Encounters:   03/27/17 134/89   03/03/17 132/86   01/27/16 146/85    Weight from Last 3 Encounters:   03/27/17 87.2 kg (192 lb 3.2 oz)   01/27/16 79.8 kg (176 lb)   10/14/15 81.2 kg (179 lb)              We Performed the Following     Tryptase        Primary Care Provider Office Phone # Fax #    [facility] [phone] [address] [facility] [address]        Thank you!     Thank you for choosing [facility]  for your care. Our goal is always to provide you with excellent care. Hearing back from our patients is one way we can continue to improve our services. Please take a few minutes to complete the written survey that you may receive in the mail after your visit with us. Thank you!             Your Updated Medication List - Protect others around you: Learn how to safely use, store and throw away your medicines at www.disposemymeds.org.          This list is accurate as of: 3/27/17  1:43 PM.  Always use your most recent med list.                   Brand Name Dispense Instructions for use    * cyclobenzaprine 10 MG tablet    FLEXERIL    30 tablet    Take 1 tablet (10 mg) by mouth 3 times daily as needed for muscle spasms       * cyclobenzaprine 10 MG tablet    FLEXERIL    30 tablet    Take 0.5-1 tablets (5-10 mg) by mouth 2 times daily as needed for muscle spasms       EPINEPHrine 0.3 MG/0.3ML injection     1 each    Inject 0.3 mLs (0.3 mg) into the muscle once as needed for anaphylaxis       oxyCODONE-acetaminophen 5-325 MG per tablet    PERCOCET    30 tablet    Take 1 tablet by mouth every 6 hours as needed for pain       rizatriptan 10 MG ODT tab    MAXALT-MLT    6 tablet    Take 1 tablet by mouth at onset of headache for migraine. May repeat in 2 hours if needed: max " 2/day; average number of headaches monthly 1       * Notice:  This list has 2 medication(s) that are the same as other medications prescribed for you. Read the directions carefully, and ask your doctor or other care provider to review them with you.

## 2017-03-28 LAB — TRYPTASE SERPL-MCNC: 6.2 NG/ML

## 2017-03-30 ENCOUNTER — RADIANT APPOINTMENT (OUTPATIENT)
Dept: MAMMOGRAPHY | Facility: CLINIC | Age: 52
End: 2017-03-30
Attending: NURSE PRACTITIONER
Payer: COMMERCIAL

## 2017-03-30 DIAGNOSIS — Z12.31 VISIT FOR SCREENING MAMMOGRAM: ICD-10-CM

## 2017-03-30 PROCEDURE — G0202 SCR MAMMO BI INCL CAD: HCPCS

## 2017-04-04 ENCOUNTER — OFFICE VISIT (OUTPATIENT)
Dept: ALLERGY | Facility: CLINIC | Age: 52
End: 2017-04-04
Payer: COMMERCIAL

## 2017-04-04 ENCOUNTER — OFFICE VISIT (OUTPATIENT)
Dept: OBGYN | Facility: CLINIC | Age: 52
End: 2017-04-04
Payer: COMMERCIAL

## 2017-04-04 VITALS
SYSTOLIC BLOOD PRESSURE: 123 MMHG | HEIGHT: 64 IN | OXYGEN SATURATION: 97 % | WEIGHT: 196 LBS | DIASTOLIC BLOOD PRESSURE: 86 MMHG | BODY MASS INDEX: 33.46 KG/M2 | HEART RATE: 82 BPM

## 2017-04-04 VITALS
HEART RATE: 108 BPM | HEIGHT: 65 IN | SYSTOLIC BLOOD PRESSURE: 141 MMHG | BODY MASS INDEX: 32.29 KG/M2 | DIASTOLIC BLOOD PRESSURE: 85 MMHG | TEMPERATURE: 98.6 F | WEIGHT: 193.8 LBS

## 2017-04-04 DIAGNOSIS — Z13.1 SCREENING FOR DIABETES MELLITUS: ICD-10-CM

## 2017-04-04 DIAGNOSIS — G43.109 MIGRAINE WITH AURA AND WITHOUT STATUS MIGRAINOSUS, NOT INTRACTABLE: ICD-10-CM

## 2017-04-04 DIAGNOSIS — Z13.6 CARDIOVASCULAR SCREENING; LDL GOAL LESS THAN 160: ICD-10-CM

## 2017-04-04 DIAGNOSIS — Z01.419 ENCOUNTER FOR GYNECOLOGICAL EXAMINATION WITHOUT ABNORMAL FINDING: Primary | ICD-10-CM

## 2017-04-04 DIAGNOSIS — Z91.038 HISTORY OF INSECT STING ALLERGY: Primary | ICD-10-CM

## 2017-04-04 DIAGNOSIS — Z12.12 SCREENING FOR MALIGNANT NEOPLASM OF THE RECTUM: ICD-10-CM

## 2017-04-04 DIAGNOSIS — Z80.41 FAMILY HISTORY OF MALIGNANT NEOPLASM OF OVARY: ICD-10-CM

## 2017-04-04 DIAGNOSIS — K21.9 GASTROESOPHAGEAL REFLUX DISEASE WITHOUT ESOPHAGITIS: ICD-10-CM

## 2017-04-04 PROCEDURE — 99396 PREV VISIT EST AGE 40-64: CPT | Performed by: NURSE PRACTITIONER

## 2017-04-04 PROCEDURE — 87624 HPV HI-RISK TYP POOLED RSLT: CPT | Performed by: NURSE PRACTITIONER

## 2017-04-04 PROCEDURE — 95017 ALL TSTG PERQ&IQ W/VENOMS: CPT | Performed by: ALLERGY & IMMUNOLOGY

## 2017-04-04 PROCEDURE — 99207 ZZC DROP WITH A PROCEDURE: CPT | Mod: 25 | Performed by: ALLERGY & IMMUNOLOGY

## 2017-04-04 PROCEDURE — G0145 SCR C/V CYTO,THINLAYER,RESCR: HCPCS | Performed by: NURSE PRACTITIONER

## 2017-04-04 RX ORDER — RIZATRIPTAN BENZOATE 10 MG/1
10 TABLET, ORALLY DISINTEGRATING ORAL
Qty: 6 TABLET | Refills: 6 | Status: SHIPPED | OUTPATIENT
Start: 2017-04-04 | End: 2018-06-01

## 2017-04-04 ASSESSMENT — PAIN SCALES - GENERAL: PAINLEVEL: NO PAIN (0)

## 2017-04-04 NOTE — LETTER
April 13, 2017    Christina Guzman  [address] [address]    Dear Christina,  We are happy to inform you that your PAP smear result from 4/4/17 is normal.  We are now able to do a follow up test on PAP smears. The DNA test is for HPV (Human Papilloma Virus). Cervical cancer is closely linked with certain types of HPV. Your result showed no evidence of high risk HPV.  Therefore we recommend you return in 3 years for your next pap smear and HPV test.  You will still need to return to the clinic every year for an annual exam and other preventive tests.  Please contact the clinic at [phone] with any questions.  Sincerely,    MARIANO Wilder CNP/rlm

## 2017-04-04 NOTE — PROGRESS NOTES
S: Pt is a 51 year old  3 para 2 who presents today for an annual female exam. LMP: 3/30/17. Contraception: Partner with Vasectomy. Declines STD screening. Last Pap smear: 2013 and was NIL. Immunizations up to date. Dental Exams: reviewed. Diet and calcium reviewed. Exercise: nothing regular .   Mammogram: 3/2017. Lipid panel: . Previous colonoscopy - unsure of dates, was done through [facility], patient thinks she was next due at age 51.   Requesting CA-125 due to family history of ovarian cancer, aware of limitations of testing.    Past Medical History:   Diagnosis Date     Chronic idiopathic urticaria x     IgE 638, Tryptase=11 (min. elevated)     Fructose disorder     Fructose Malabsorbtion     House dust mite allergy 9/7/10 RAST    DM only     Menopausal depression 2012     Migraines      Past Surgical History:   Procedure Laterality Date     C ORAL SURGERY PROCEDURE  2017    Cyst removed from abscess in tooth     ORTHOPEDIC SURGERY  2014    bone spur shaved from left big toe     TOE SURGERY      Left foot     Social History   Substance Use Topics     Smoking status: Current Every Day Smoker     Packs/day: 0.50     Years: 30.00     Types: Cigarettes     Smokeless tobacco: Never Used     Alcohol use Yes      Comment: occ       REVIEW OF SYSTEMS:  CONSTITUTIONAL:NEGATIVE for fever, chills, change in weight  EYES: NEGATIVE for vision changes or irritation  ENT/MOUTH: NEGATIVE for ear, mouth and throat problems-patient with swelling of her upper lip-oral procedure done yesterday  RESP:NEGATIVE for significant cough or SOB  CV: NEGATIVE for chest pain, palpitations or peripheral edema  GI: NEGATIVE for nausea, abdominal pain, heartburn, or change in bowel habits  Periods are monthly, Cyclic symptoms include none. No intermenstrual bleeding.  MUSCULOSKELATAL:NEGATIVE for significant arthralgias or myalgia  INTEGUMENTARY/SKIN: NEGATIVE for worrisome rashes, moles or  lesions  NEURO: NEGATIVE for weakness, dizziness or paresthesias. POSITIVE for migraines. Have improved in the last few years.  ENDOCRINE: NEGATIVE for temperature intolerance, skin/hair changes  HEME/ALLERGY/IMMUNE: NEGATIVE for bleeding problems  PSYCHIATRIC: NEGATIVE for changes in mood or affect      OBJECTIVE: This is a well appearing female in no acute distress. Answers questions and maintains eye contact appropriately. Vital signs noted.     EXAM:  EYES: Eyes grossly normal to inspection, PERRL and conjunctivae and sclerae normal  HENT: ear canals and TM's normal and nose and mouth without ulcers or lesions  NECK: no adenopathy, no asymmetry, masses, or scars and thyroid normal to palpation  RESP: lungs clear to auscultation - no rales, rhonchi or wheezes  CV: regular rates and rhythm, normal S1 S2, no S3 or S4 and no murmur, click or rub  LYMPH: normal ant/post cervical and supraclavicular nodes  ABD/GI: soft, nontender, without hepatosplenomegaly or masses  MS: extremities normal- no gross deformities noted  SKIN: no suspicious lesions or rashes  NEURO: Normal strength and tone, mentation intact and speech normal  PSYCH: mentation appears normal and affect normal/bright  Breast exam: Breasts are symmetrical without masses, lymphadenopathy, retraction, dimpling, or nipple discharge bilaterally.  Pelvic Exam: External genitalia without visible lesions or discharge. Normal BUS. Vaginal mucosa pink, rugated, moist, without lesions or discharge. Cervix is pink, multiparous , midline, without cervical motion tenderness. Pap smear is obtained. Uterus normal size and shape without tenderness or masses. Adnexa without masses or tenderness bilaterally. Rectovaginal exam WNL.    A/P:  1) Normal annual female exam. Health maintenance updated. Reviewed mammogram recommendations. Regular physical activity and healthy diet encouraged. Lipid panel and glucose to be done fasting. Colonoscopy - patient will confirm if she is  due by checking records at home. Continue regular dental exams. Encouraged adequate calcium intake. New ASCCP guidelines regarding cervical cancer screening discussed with patient. Reviewed recommendations for HPV screening and patient accepts HPV testing at this time.   2) GERD. Refill medication x 1 year  3) Migraine. Refill Maxalt.  4) Family history of ovarian cancer. Patient aware of limitations of CA-125 testing, prefers to continue screening, will do with her fasting labs.    Erika QUINTANA CNP

## 2017-04-04 NOTE — MR AVS SNAPSHOT
After Visit Summary   4/4/2017    Christina Guzman    MRN: [id number]           Patient Information     Date Of Birth          1965        Visit Information        Provider Department      4/4/2017 3:10 PM Erika Dillon APRN CNP [facility]        Today's Diagnoses     Encounter for gynecological examination without abnormal finding    -  1    Screening for malignant neoplasm of the rectum        CARDIOVASCULAR SCREENING; LDL GOAL LESS THAN 160        Screening for diabetes mellitus        Gastroesophageal reflux disease without esophagitis        Migraine with aura and without status migrainosus, not intractable        Family history of malignant neoplasm of ovary           Follow-ups after your visit        Additional Services     GASTROENTEROLOGY ADULT REF PROCEDURE ONLY       Last Lab Result: Creatinine (mg/dL)       Date                     Value                 09/07/2010               0.67             ----------  There is no height or weight on file to calculate BMI.      Patient will be contacted to schedule procedure.     Please be aware that coverage of these services is subject to the terms and limitations of your health insurance plan.  Call member services at your health plan with any benefit or coverage questions.  Any procedures must be performed at a [facility] facility OR coordinated by your clinic's referral office.    Please bring the following with you to your appointment:    (1) Any X-Rays, CTs or MRIs which have been performed.  Contact the facility where they were done to arrange for  prior to your scheduled appointment.    (2) List of current medications   (3) This referral request   (4) Any documents/labs given to you for this referral                  Future tests that were ordered for you today     Open Future Orders        Priority Expected Expires Ordered     Routine  4/4/2018 4/4/2017    Lipid panel reflex to direct LDL Routine   "4/4/2018 4/4/2017    Glucose Routine  4/4/2018 4/4/2017            Who to contact     If you have questions or need follow up information about today's clinic visit or your schedule please contact [facility] directly at [phone].  Normal or non-critical lab and imaging results will be communicated to you by MyChart, letter or phone within 4 business days after the clinic has received the results. If you do not hear from us within 7 days, please contact the clinic through [employer] or phone. If you have a critical or abnormal lab result, we will notify you by phone as soon as possible.  Submit refill requests through [employer] or call your pharmacy and they will forward the refill request to us. Please allow 3 business days for your refill to be completed.          Additional Information About Your Visit        [employer] Information     [employer] gives you secure access to your electronic health record. If you see a primary care provider, you can also send messages to your care team and make appointments. If you have questions, please call your primary care clinic.  If you do not have a primary care provider, please call [phone] and they will assist you.        Care EveryWhere ID     This is your Care EveryWhere ID. This could be used by other organizations to access your [facility] medical records  [id number]        Your Vitals Were     Pulse Temperature Height Last Period BMI (Body Mass Index)       108 98.6  F (37  C) (Tympanic) 5' 4.75\" (1.645 m) 03/30/2017 (Exact Date) 32.5 kg/m2        Blood Pressure from Last 3 Encounters:   04/04/17 141/85   04/04/17 123/86   03/27/17 134/89    Weight from Last 3 Encounters:   04/04/17 193 lb 12.8 oz (87.9 kg)   04/04/17 196 lb (88.9 kg)   03/27/17 192 lb 3.2 oz (87.2 kg)              We Performed the Following     GASTROENTEROLOGY ADULT REF PROCEDURE ONLY     HPV High Risk Types DNA Cervical     Pap imaged thin layer screen with HPV - recommended age 30 - 65 " years (select HPV order below)          Today's Medication Changes          These changes are accurate as of: 4/4/17  3:32 PM.  If you have any questions, ask your nurse or doctor.               Start taking these medicines.        Dose/Directions    ranitidine 300 MG tablet   Commonly known as:  ZANTAC   Used for:  Gastroesophageal reflux disease without esophagitis   Started by:  Erika Dillon APRN CNP        Dose:  300 mg   Take 1 tablet (300 mg) by mouth daily as needed for heartburn As Needed   Quantity:  90 tablet   Refills:  3         These medicines have changed or have updated prescriptions.        Dose/Directions    cyclobenzaprine 10 MG tablet   Commonly known as:  FLEXERIL   This may have changed:  Another medication with the same name was removed. Continue taking this medication, and follow the directions you see here.   Used for:  Left low back pain, unspecified chronicity, with sciatica presence unspecified   Changed by:  Mateo Rodriguez MD        Dose:  5-10 mg   Take 0.5-1 tablets (5-10 mg) by mouth 2 times daily as needed for muscle spasms   Quantity:  30 tablet   Refills:  1       rizatriptan 10 MG ODT tab   Commonly known as:  MAXALT-MLT   This may have changed:  additional instructions   Used for:  Migraine with aura and without status migrainosus, not intractable   Changed by:  Erika Dillon APRN CNP        Dose:  10 mg   Take 1 tablet (10 mg) by mouth at onset of headache for migraine May repeat in 2 hours if needed: max 2/day   Quantity:  6 tablet   Refills:  6            Where to get your medicines      These medications were sent to [facility] [address] [place] [address] [address] [place] [address] AT [address]  [address] [facility] [address]     Phone:  [phone]     ranitidine 300 MG tablet    rizatriptan 10 MG ODT tab                Primary Care Provider Office Phone # Fax #    [facility] [phone]        [address] [facility] [address]        Thank you!     Thank you for choosing [facility]  for your care. Our goal is always to provide you with excellent care. Hearing back from our patients is one way we can continue to improve our services. Please take a few minutes to complete the written survey that you may receive in the mail after your visit with us. Thank you!             Your Updated Medication List - Protect others around you: Learn how to safely use, store and throw away your medicines at www.disposemymeds.org.          This list is accurate as of: 4/4/17  3:32 PM.  Always use your most recent med list.                   Brand Name Dispense Instructions for use    AMOXICILLIN PO      Take by mouth 3 times daily       cyclobenzaprine 10 MG tablet    FLEXERIL    30 tablet    Take 0.5-1 tablets (5-10 mg) by mouth 2 times daily as needed for muscle spasms       EPINEPHrine 0.3 MG/0.3ML injection     1 each    Inject 0.3 mLs (0.3 mg) into the muscle once as needed for anaphylaxis       oxyCODONE-acetaminophen 5-325 MG per tablet    PERCOCET    30 tablet    Take 1 tablet by mouth every 6 hours as needed for pain       ranitidine 300 MG tablet    ZANTAC    90 tablet    Take 1 tablet (300 mg) by mouth daily as needed for heartburn As Needed       rizatriptan 10 MG ODT tab    MAXALT-MLT    6 tablet    Take 1 tablet (10 mg) by mouth at onset of headache for migraine May repeat in 2 hours if needed: max 2/day       VENOM TESTING CMPD KIT    SOPHY/ROBERT PROTOCOL    1 kit    Kit to consist of the venom of:  wasp, honey bee, yellow jacket, white-faced hornet, and yellow-faced hornet all combined in the following concentrations:  0.001 mcg/mL (2 mL), 0.01 mcg/mL (2 mL), 0.1 mcg/mL (2 mL), 1 mcg/mL (2 mL).

## 2017-04-04 NOTE — PROGRESS NOTES
"Christina Guzman was seen in the Allergy Clinic at [facility]. The following are my recommendations regarding her History of Insect Sting Allergy    1. No evidence of IgE mediated allergy to stinging insects. Patient is not at increased risk of stinging insect allergy compared to the general population  2. Follow-up as needed    Christina Guzman is a 51 year old American female who is seen today for venom testing. She is feeling well and has had no recent illnesses and is not taking any medications.      REVIEW OF SYSTEMS:  Complete review of systems was performed and is negative      Current Outpatient Prescriptions:      AMOXICILLIN PO, Take by mouth 3 times daily, Disp: , Rfl:      VENOM TESTING ([facility] PROTOCOL) CMPD KIT, Kit to consist of the venom of:  wasp, honey bee, yellow jacket, white-faced hornet, and yellow-faced hornet all combined in the following concentrations:  0.001 mcg/mL (2 mL), 0.01 mcg/mL (2 mL), 0.1 mcg/mL (2 mL), 1 mcg/mL (2 mL)., Disp: 1 kit, Rfl: 0     cyclobenzaprine (FLEXERIL) 10 MG tablet, Take 0.5-1 tablets (5-10 mg) by mouth 2 times daily as needed for muscle spasms, Disp: 30 tablet, Rfl: 1     oxyCODONE-acetaminophen (PERCOCET) 5-325 MG per tablet, Take 1 tablet by mouth every 6 hours as needed for pain, Disp: 30 tablet, Rfl: 0     EPINEPHrine (EPIPEN) 0.3 MG/0.3ML injection, Inject 0.3 mLs (0.3 mg) into the muscle once as needed for anaphylaxis, Disp: 1 each, Rfl: 3     cyclobenzaprine (FLEXERIL) 10 MG tablet, Take 1 tablet (10 mg) by mouth 3 times daily as needed for muscle spasms, Disp: 30 tablet, Rfl: 0     rizatriptan (MAXALT-MLT) 10 MG disintegrating tablet, Take 1 tablet by mouth at onset of headache for migraine. May repeat in 2 hours if needed: max 2/day; average number of headaches monthly 1, Disp: 6 tablet, Rfl: 11    EXAM:   /86  Pulse 82  Ht 1.626 m (5' 4\")  Wt 88.9 kg (196 lb)  SpO2 97%  BMI 33.64 kg/m2  GENERAL APPEARANCE: alert, " cooperative and not in distress  SKIN: no rashes, no lesions  HEAD: atraumatic, normocephalic  NECK: no asymmetry, masses, or scars, supple without significant adenopathy  LUNGS: unlabored respirations, no intercostal retractions or accessory muscle use, clear to auscultation without rales or wheezes  HEART: regular rate and rhythm without murmurs and normal S1 and S2  MUSCULOSKELETAL: no musculoskeletal defects are noted  NEURO: no focal deficits noted, mental status intact  PSYCH: does not appear depressed or anxious and short and long term memory appears intact      WORKUP:  Skin testing    Skin prick and intradermal testing was performed to venoms. Patient had positive histamine skin prick test and all other skin prick and intradermal tests were negative.    ASSESSMENT/PLAN:  Christina Guzman is a 51 year old female here for testing to stinging insects. She tolerated the procedure well.    1. No evidence of IgE mediated allergy to stinging insects. Patient is not at increased risk of stinging insect allergy compared to the general population  2. Follow-up as needed    Holger Uribe MD  Allergy/Immunology  [place], [place]      Chart documentation done in part with Dragon Voice Recognition Software. Although reviewed after completion, some word and grammatical errors may remain.

## 2017-04-04 NOTE — MR AVS SNAPSHOT
After Visit Summary   4/4/2017    Christina Guzman    MRN: [id number]           Patient Information     Date Of Birth          1965        Visit Information        Provider Department      4/4/2017 8:00 AM Holger Uribe MD [facility]        Today's Diagnoses     History of insect sting allergy    -  1       Follow-ups after your visit        Your next 10 appointments already scheduled     Apr 04, 2017  3:10 PM CDT   PHYSICAL with MARIANO Wilder CNP   [facility]    [address] [facility] [address] [phone]              Who to contact     If you have questions or need follow up information about today's clinic visit or your schedule please contact [facility] directly at [phone].  Normal or non-critical lab and imaging results will be communicated to you by [employer], letter or phone within 4 business days after the clinic has received the results. If you do not hear from us within 7 days, please contact the clinic through MyChart or phone. If you have a critical or abnormal lab result, we will notify you by phone as soon as possible.  Submit refill requests through [employer] or call your pharmacy and they will forward the refill request to us. Please allow 3 business days for your refill to be completed.          Additional Information About Your Visit        MyChart Information     [employer] gives you secure access to your electronic health record. If you see a primary care provider, you can also send messages to your care team and make appointments. If you have questions, please call your primary care clinic.  If you do not have a primary care provider, please call [phone] and they will assist you.        Care EveryWhere ID     This is your Care EveryWhere ID. This could be used by other organizations to access your [facility] medical records  [id number]        Your Vitals Were     Pulse Height  "Pulse Oximetry BMI (Body Mass Index)          82 1.626 m (5' 4\") 97% 33.64 kg/m2         Blood Pressure from Last 3 Encounters:   04/04/17 123/86   03/27/17 134/89   03/03/17 132/86    Weight from Last 3 Encounters:   04/04/17 88.9 kg (196 lb)   03/27/17 87.2 kg (192 lb 3.2 oz)   01/27/16 79.8 kg (176 lb)              We Performed the Following     HC ALLG TSTG PERQ & IC VENOMS IMMED REACT W/ I&R        Primary Care Provider Office Phone # Fax #    [facility] [phone] [address] [facility] [address]        Thank you!     Thank you for choosing [facility]  for your care. Our goal is always to provide you with excellent care. Hearing back from our patients is one way we can continue to improve our services. Please take a few minutes to complete the written survey that you may receive in the mail after your visit with us. Thank you!             Your Updated Medication List - Protect others around you: Learn how to safely use, store and throw away your medicines at www.disposemymeds.org.          This list is accurate as of: 4/4/17 10:30 AM.  Always use your most recent med list.                   Brand Name Dispense Instructions for use    AMOXICILLIN PO      Take by mouth 3 times daily       * cyclobenzaprine 10 MG tablet    FLEXERIL    30 tablet    Take 1 tablet (10 mg) by mouth 3 times daily as needed for muscle spasms       * cyclobenzaprine 10 MG tablet    FLEXERIL    30 tablet    Take 0.5-1 tablets (5-10 mg) by mouth 2 times daily as needed for muscle spasms       EPINEPHrine 0.3 MG/0.3ML injection     1 each    Inject 0.3 mLs (0.3 mg) into the muscle once as needed for anaphylaxis       oxyCODONE-acetaminophen 5-325 MG per tablet    PERCOCET    30 tablet    Take 1 tablet by mouth every 6 hours as needed for pain       rizatriptan 10 MG ODT tab    MAXALT-MLT    6 tablet    Take 1 tablet by mouth at onset of headache for migraine. May repeat in 2 hours if " needed: max 2/day; average number of headaches monthly 1       VENOM TESTING CMPD KIT    SOPHY/ROBERT PROTOCOL    1 kit    Kit to consist of the venom of:  wasp, honey bee, yellow jacket, white-faced hornet, and yellow-faced hornet all combined in the following concentrations:  0.001 mcg/mL (2 mL), 0.01 mcg/mL (2 mL), 0.1 mcg/mL (2 mL), 1 mcg/mL (2 mL).       * Notice:  This list has 2 medication(s) that are the same as other medications prescribed for you. Read the directions carefully, and ask your doctor or other care provider to review them with you.

## 2017-04-04 NOTE — NURSING NOTE
"Chief Complaint   Patient presents with     RECHECK     venom testing       Initial /86  Pulse 82  Ht 1.626 m (5' 4\")  Wt 88.9 kg (196 lb)  SpO2 97%  BMI 33.64 kg/m2 Estimated body mass index is 33.64 kg/(m^2) as calculated from the following:    Height as of this encounter: 1.626 m (5' 4\").    Weight as of this encounter: 88.9 kg (196 lb).  Medication Reconciliation: complete   Dayanna Garcia MA      "

## 2017-04-04 NOTE — NURSING NOTE
"Chief Complaint   Patient presents with     Physical       Initial /85  Pulse 108  Temp 98.6  F (37  C) (Tympanic)  Ht 5' 4.75\" (1.645 m)  Wt 193 lb 12.8 oz (87.9 kg)  LMP 03/30/2017 (Exact Date)  BMI 32.5 kg/m2 Estimated body mass index is 32.5 kg/(m^2) as calculated from the following:    Height as of this encounter: 5' 4.75\" (1.645 m).    Weight as of this encounter: 193 lb 12.8 oz (87.9 kg)..  BP completed using cuff size: regular    Last pap : 04/30/2013 TANYA Reyez CMA      "

## 2017-04-06 LAB
COPATH REPORT: NORMAL
PAP: NORMAL

## 2017-04-11 LAB
FINAL DIAGNOSIS: NORMAL
HPV HR 12 DNA CVX QL NAA+PROBE: NEGATIVE
HPV16 DNA SPEC QL NAA+PROBE: NEGATIVE
HPV18 DNA SPEC QL NAA+PROBE: NEGATIVE
SPECIMEN DESCRIPTION: NORMAL

## 2017-05-01 ENCOUNTER — MYC REFILL (OUTPATIENT)
Dept: OBGYN | Facility: CLINIC | Age: 52
End: 2017-05-01

## 2017-05-01 DIAGNOSIS — K21.9 GASTROESOPHAGEAL REFLUX DISEASE WITHOUT ESOPHAGITIS: ICD-10-CM

## 2017-05-01 DIAGNOSIS — G43.109 MIGRAINE WITH AURA AND WITHOUT STATUS MIGRAINOSUS, NOT INTRACTABLE: ICD-10-CM

## 2017-05-01 RX ORDER — RIZATRIPTAN BENZOATE 10 MG/1
10 TABLET, ORALLY DISINTEGRATING ORAL
Qty: 6 TABLET | Refills: 6 | Status: CANCELLED | OUTPATIENT
Start: 2017-05-01

## 2017-05-04 NOTE — TELEPHONE ENCOUNTER
Message from [place]:  Original authorizing provider: MARIANO Wilder CNP would like a refill of the following medications:  rizatriptan (MAXALT-MLT) 10 MG ODT tab [MARIANO Wilder CNP]  ranitidine (ZANTAC) 300 MG tablet [MARIANO Wilder CNP]    Preferred pharmacy: [facility] [address] [place] [address] K[facility] AT [address]    Comment:

## 2017-12-12 ENCOUNTER — TELEPHONE (OUTPATIENT)
Dept: URGENT CARE | Facility: URGENT CARE | Age: 52
End: 2017-12-12

## 2017-12-12 NOTE — TELEPHONE ENCOUNTER
Reason for Call:  Form, our goal is to have forms completed with 72 hours, however, some forms may require a visit or additional information.    Type of letter, form or note:  employer    Who is the form from?: Patient    Where did the form come from: Patient or family brought in       What clinic location was the form placed at?: [facility]    Where the form was placed: 's Box    What number is listed as a contact on the form?: [phone]        Additional comments: please fax to [phone] and call patient to inform patient.     Call taken on 12/12/2017 at 12:28 PM by Luis M George

## 2017-12-14 NOTE — TELEPHONE ENCOUNTER
I am reviewing the forms left for Dr. Rodriguez.  These forms are not about the visit, but is asking for patient to be released back to work.  Unfortunately patient was seen only as a one time visit by Dr. Rodriguez way back in March when she had severe pain.  She needs a follow up either with her primary care provider or in clinic for re-evaluation for this to be filled out.  But neither Dr. Rodriguez, nor any of the covering pool can release her back to work without evaluation.  Please explain to patient.  Thank you  Elizabeth Bertrand M.D.

## 2017-12-18 NOTE — TELEPHONE ENCOUNTER
Mobile number in patient's chart not in service went attempting call today.     LM on home phone.    Darlin Humphrey,

## 2017-12-18 NOTE — PROGRESS NOTES
SUBJECTIVE:                                                    Christina Guzman is a 52 year old female who presents to clinic today for the following health issues:    Back Pain       Duration: on and off for a long time        Specific cause: none    Description:   Location of pain: low back left  Character of pain: sharp, dull ache and stabbing  Pain radiation:radiates into the left buttocks and radiates into the left leg  New numbness or weakness in legs, not attributed to pain:  no     Intensity: moderate    History:   Pain interferes with job: YES  History of back problems: recurrent self limited episodes of low back pain in the past  Any previous MRI or X-rays: Yes- at [facility].  Date 2 yrs ago  Sees a specialist for back pain:  No  Therapies tried without relief: massage, muscle relaxants and stretch    Alleviating factors:   Improved by: muscle relaxants and stretch      Precipitating factors:  Worsened by: Nothing    Functional and Psychosocial Screen ([facility] STarT Back):      Not performed today                On and off back pain. No pain today but had some two weeks ago and took her last pain pill, then had job screening urine drug screen test.   Has had MRIs before. Saw specialist 2 years ago.  [employer]. Not sure when her last MRI was.  Was told she had arthritis in her back. Back flares up once in a while. Has no PCP, just sees OBGYN.  Saw  doctor here in march.  Since march has had two flares. Went to [facility] previously.   Has done physical therapy in the past.  Uses pain medication PRN. Is out of medication.     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     Does not need/want refill of pain medications, needs paperwork filled out today.     Colon cancer was done through [facility] 4-5 yrs ago normal colonoscopy, Data will be abstracted in chart.      Problem list and histories reviewed & adjusted, as indicated.  Additional history: as documented    Patient Active  Problem List   Diagnosis     Tobacco abuse     Chronic idiopathic urticaria     House dust mite allergy     CARDIOVASCULAR SCREENING; LDL GOAL LESS THAN 160     Migraine headache     Low back pain     Menopausal depression     Menopausal syndrome     Vitamin D deficiency     GERD (gastroesophageal reflux disease)     Toe pain     Skin tag     Past Surgical History:   Procedure Laterality Date     C ORAL SURGERY PROCEDURE  2017    Cyst removed from abscess in tooth     ENT SURGERY  2017    Oral surgery     ORTHOPEDIC SURGERY  2014    bone spur shaved from left big toe     TOE SURGERY      Left foot       Social History   Substance Use Topics     Smoking status: Current Every Day Smoker     Packs/day: 0.50     Years: 30.00     Types: Cigarettes     Smokeless tobacco: Never Used     Alcohol use Yes      Comment: occ     Family History   Problem Relation Age of Onset     CANCER Mother      OVARIAN,  age 46,48     Other Cancer Mother      Ovarian     Respiratory Father      CANCER Father      Lung     Hypertension Father      Obesity Father      DIABETES Maternal Grandmother      Breast Cancer Maternal Grandmother      DIABETES Maternal Grandfather      DIABETES Paternal Grandmother      DIABETES Paternal Grandfather          Current Outpatient Prescriptions   Medication Sig Dispense Refill     rizatriptan (MAXALT-MLT) 10 MG ODT tab Take 1 tablet (10 mg) by mouth at onset of headache for migraine May repeat in 2 hours if needed: max 2/day 6 tablet 6     ranitidine (ZANTAC) 300 MG tablet Take 1 tablet (300 mg) by mouth daily as needed for heartburn As Needed 90 tablet 3     cyclobenzaprine (FLEXERIL) 10 MG tablet Take 0.5-1 tablets (5-10 mg) by mouth 2 times daily as needed for muscle spasms 30 tablet 1     oxyCODONE-acetaminophen (PERCOCET) 5-325 MG per tablet Take 1 tablet by mouth every 6 hours as needed for pain 30 tablet 0     EPINEPHrine (EPIPEN) 0.3 MG/0.3ML injection Inject 0.3 mLs (0.3 mg) into  "the muscle once as needed for anaphylaxis 1 each 3     Allergies   Allergen Reactions     Clindamycin          ROS:  Constitutional, HEENT, cardiovascular, pulmonary, gi and gu systems are negative, except as otherwise noted.      OBJECTIVE:   /86  Pulse 92  Temp 97.2  F (36.2  C) (Oral)  Ht 5' 5\" (1.651 m)  Wt 177 lb (80.3 kg)  SpO2 98%  Breastfeeding? No  BMI 29.45 kg/m2  Body mass index is 29.45 kg/(m^2).  GENERAL: healthy, alert and no distress  RESP: non-labored  MS: no gross musculoskeletal defects noted, no edema  PSYCH: mentation appears normal, affect normal/bright        ASSESSMENT/PLAN:     ASSESSMENT / PLAN:  (M54.5) Low back pain, unspecified back pain laterality, unspecified chronicity, with sciatica presence unspecified  (primary encounter diagnosis)  Comment:   Plan:     No pain today  Paperwork filled out  Back to specialist if flares become more frequent  Continue flexeril and advil PRN      Patti Liang PA-C  [facility]    "

## 2017-12-18 NOTE — TELEPHONE ENCOUNTER
Spoke to patient, got her scheduled with Patti Liang tomorrow Tue 12/19/17 at 11am. Forms given to Garth AHUJA.    Darlin Humphrey,

## 2017-12-19 ENCOUNTER — OFFICE VISIT (OUTPATIENT)
Dept: FAMILY MEDICINE | Facility: CLINIC | Age: 52
End: 2017-12-19
Payer: COMMERCIAL

## 2017-12-19 VITALS
WEIGHT: 177 LBS | OXYGEN SATURATION: 98 % | TEMPERATURE: 97.2 F | BODY MASS INDEX: 29.49 KG/M2 | HEART RATE: 92 BPM | DIASTOLIC BLOOD PRESSURE: 86 MMHG | HEIGHT: 65 IN | SYSTOLIC BLOOD PRESSURE: 128 MMHG

## 2017-12-19 DIAGNOSIS — M54.5 LOW BACK PAIN, UNSPECIFIED BACK PAIN LATERALITY, UNSPECIFIED CHRONICITY, WITH SCIATICA PRESENCE UNSPECIFIED: Primary | ICD-10-CM

## 2017-12-19 PROCEDURE — 99213 OFFICE O/P EST LOW 20 MIN: CPT | Performed by: PHYSICIAN ASSISTANT

## 2017-12-19 NOTE — MR AVS SNAPSHOT
"              After Visit Summary   12/19/2017    Christina Guzman    MRN: [id number]           Patient Information     Date Of Birth          1965        Visit Information        Provider Department      12/19/2017 11:00 AM Patti iLang PA-C [facility]         Follow-ups after your visit        Who to contact     If you have questions or need follow up information about today's clinic visit or your schedule please contact [facility] directly at [phone].  Normal or non-critical lab and imaging results will be communicated to you by Beatsyhart, letter or phone within 4 business days after the clinic has received the results. If you do not hear from us within 7 days, please contact the clinic through Beatsyhart or phone. If you have a critical or abnormal lab result, we will notify you by phone as soon as possible.  Submit refill requests through Plainlegal or call your pharmacy and they will forward the refill request to us. Please allow 3 business days for your refill to be completed.          Additional Information About Your Visit        MyChart Information     [employer] gives you secure access to your electronic health record. If you see a primary care provider, you can also send messages to your care team and make appointments. If you have questions, please call your primary care clinic.  If you do not have a primary care provider, please call [phone] and they will assist you.        Care EveryWhere ID     This is your Care EveryWhere ID. This could be used by other organizations to access your [place] medical records  [id number]        Your Vitals Were     Pulse Temperature Height Pulse Oximetry Breastfeeding? BMI (Body Mass Index)    92 97.2  F (36.2  C) (Oral) 5' 5\" (1.651 m) 98% No 29.45 kg/m2       Blood Pressure from Last 3 Encounters:   12/19/17 128/86   04/04/17 141/85   04/04/17 123/86    Weight from Last 3 Encounters:   12/19/17 177 lb (80.3 kg) "   04/04/17 193 lb 12.8 oz (87.9 kg)   04/04/17 196 lb (88.9 kg)              Today, you had the following     No orders found for display       Primary Care Provider Office Phone # Fax #    [facility] [phone] [address] [facility] [address]        Equal Access to Services     TORI GUZMAN : Hadii aad ku hadasho Soomaali, waaxda luqadaha, qaybta kaalmada adeegyada, waxay idiin hayaan adeeg kharash latori . So [facility] [phone].    ATENCIÓN: Si habla español, tiene a robb disposición servicios gratuitos de asistencia lingüística. Llame al [phone].    We comply with applicable federal civil rights laws and [place] laws. We do not discriminate on the basis of race, color, national origin, age, disability, sex, sexual orientation, or gender identity.            Thank you!     Thank you for choosing [facility]  for your care. Our goal is always to provide you with excellent care. Hearing back from our patients is one way we can continue to improve our services. Please take a few minutes to complete the written survey that you may receive in the mail after your visit with us. Thank you!             Your Updated Medication List - Protect others around you: Learn how to safely use, store and throw away your medicines at www.disposemymeds.org.          This list is accurate as of: 12/19/17 11:30 AM.  Always use your most recent med list.                   Brand Name Dispense Instructions for use Diagnosis    cyclobenzaprine 10 MG tablet    FLEXERIL    30 tablet    Take 0.5-1 tablets (5-10 mg) by mouth 2 times daily as needed for muscle spasms    Left low back pain, unspecified chronicity, with sciatica presence unspecified       EPINEPHrine 0.3 MG/0.3ML injection 2-pack    EPIPEN/ADRENACLICK/or ANY BX GENERIC EQUIV    1 each    Inject 0.3 mLs (0.3 mg) into the muscle once as needed for anaphylaxis    History of allergic reaction       oxyCODONE-acetaminophen  5-325 MG per tablet    PERCOCET    30 tablet    Take 1 tablet by mouth every 6 hours as needed for pain    Left low back pain, unspecified chronicity, with sciatica presence unspecified       ranitidine 300 MG tablet    ZANTAC    90 tablet    Take 1 tablet (300 mg) by mouth daily as needed for heartburn As Needed    Gastroesophageal reflux disease without esophagitis       rizatriptan 10 MG ODT tab    MAXALT-MLT    6 tablet    Take 1 tablet (10 mg) by mouth at onset of headache for migraine May repeat in 2 hours if needed: max 2/day    Migraine with aura and without status migrainosus, not intractable

## 2017-12-19 NOTE — Clinical Note
Please abstract the following data from this visit with this patient into the appropriate field in Epic:  Colonoscopy done on this date: 01/01/2014 (approximately), by this group: [facility], results were normal.

## 2017-12-19 NOTE — NURSING NOTE
"Chief Complaint   Patient presents with     Back Pain     Low back pain per pt for a long time now and on and off pain forms for work.       Initial BP (!) 164/98  Pulse 92  Temp 97.2  F (36.2  C) (Oral)  Ht 5' 5\" (1.651 m)  Wt 177 lb (80.3 kg)  SpO2 98%  Breastfeeding? No  BMI 29.45 kg/m2 Estimated body mass index is 29.45 kg/(m^2) as calculated from the following:    Height as of this encounter: 5' 5\" (1.651 m).    Weight as of this encounter: 177 lb (80.3 kg).  Medication Reconciliation: complete      Doris George MA    "

## 2018-05-15 ENCOUNTER — DOCUMENTATION ONLY (OUTPATIENT)
Dept: LAB | Facility: CLINIC | Age: 53
End: 2018-05-15

## 2018-05-15 NOTE — PROGRESS NOTES
This patient has overdue labs. A letter was sent on 4/9/2018 and there has been no lab appointment made. If you still want these labs done, please have your care team contact the patient to make a lab appointment. Otherwise, please have the labs discontinued and close the encounter.    Thank you,  [facility]

## 2018-05-18 NOTE — PROGRESS NOTES
I am reviewing for the provider who is away.    Ok to cancel the overdue labs from over 1 yr ago- done. Patient will be due for an annual exam and re-assessment.   Jewel Aviles MD

## 2018-06-01 ENCOUNTER — OFFICE VISIT (OUTPATIENT)
Dept: OBGYN | Facility: CLINIC | Age: 53
End: 2018-06-01
Payer: COMMERCIAL

## 2018-06-01 ENCOUNTER — RADIANT APPOINTMENT (OUTPATIENT)
Dept: MAMMOGRAPHY | Facility: CLINIC | Age: 53
End: 2018-06-01
Payer: COMMERCIAL

## 2018-06-01 VITALS
DIASTOLIC BLOOD PRESSURE: 79 MMHG | BODY MASS INDEX: 31.19 KG/M2 | WEIGHT: 187.2 LBS | TEMPERATURE: 98 F | SYSTOLIC BLOOD PRESSURE: 118 MMHG | OXYGEN SATURATION: 97 % | HEIGHT: 65 IN | HEART RATE: 97 BPM

## 2018-06-01 DIAGNOSIS — Z80.41 FAMILY HISTORY OF MALIGNANT NEOPLASM OF OVARY: ICD-10-CM

## 2018-06-01 DIAGNOSIS — R19.7 DIARRHEA, UNSPECIFIED TYPE: ICD-10-CM

## 2018-06-01 DIAGNOSIS — Z11.4 SCREENING FOR HUMAN IMMUNODEFICIENCY VIRUS: ICD-10-CM

## 2018-06-01 DIAGNOSIS — Z13.1 SCREENING FOR DIABETES MELLITUS: ICD-10-CM

## 2018-06-01 DIAGNOSIS — G43.109 MIGRAINE WITH AURA AND WITHOUT STATUS MIGRAINOSUS, NOT INTRACTABLE: ICD-10-CM

## 2018-06-01 DIAGNOSIS — Z01.419 ENCOUNTER FOR GYNECOLOGICAL EXAMINATION WITHOUT ABNORMAL FINDING: Primary | ICD-10-CM

## 2018-06-01 DIAGNOSIS — Z12.31 VISIT FOR SCREENING MAMMOGRAM: ICD-10-CM

## 2018-06-01 DIAGNOSIS — Z13.6 CARDIOVASCULAR SCREENING; LDL GOAL LESS THAN 160: ICD-10-CM

## 2018-06-01 DIAGNOSIS — M54.5 LEFT LOW BACK PAIN, UNSPECIFIED CHRONICITY, WITH SCIATICA PRESENCE UNSPECIFIED: ICD-10-CM

## 2018-06-01 DIAGNOSIS — Z88.9 HISTORY OF ALLERGIC REACTION: ICD-10-CM

## 2018-06-01 LAB
CANCER AG125 SERPL-ACNC: 15 U/ML (ref 0–30)
CHOLEST SERPL-MCNC: 292 MG/DL
GLUCOSE SERPL-MCNC: 86 MG/DL (ref 70–99)
HDLC SERPL-MCNC: 40 MG/DL
LDLC SERPL CALC-MCNC: 197 MG/DL
NONHDLC SERPL-MCNC: 252 MG/DL
TRIGL SERPL-MCNC: 276 MG/DL

## 2018-06-01 PROCEDURE — 82947 ASSAY GLUCOSE BLOOD QUANT: CPT | Performed by: NURSE PRACTITIONER

## 2018-06-01 PROCEDURE — 36415 COLL VENOUS BLD VENIPUNCTURE: CPT | Performed by: NURSE PRACTITIONER

## 2018-06-01 PROCEDURE — 80061 LIPID PANEL: CPT | Performed by: NURSE PRACTITIONER

## 2018-06-01 PROCEDURE — 87389 HIV-1 AG W/HIV-1&-2 AB AG IA: CPT | Performed by: NURSE PRACTITIONER

## 2018-06-01 PROCEDURE — 77067 SCR MAMMO BI INCL CAD: CPT | Mod: TC

## 2018-06-01 PROCEDURE — 86304 IMMUNOASSAY TUMOR CA 125: CPT | Performed by: NURSE PRACTITIONER

## 2018-06-01 PROCEDURE — 99396 PREV VISIT EST AGE 40-64: CPT | Performed by: NURSE PRACTITIONER

## 2018-06-01 RX ORDER — EPINEPHRINE 0.3 MG/.3ML
0.3 INJECTION SUBCUTANEOUS
Qty: 0.3 ML | Refills: 2 | Status: SHIPPED | OUTPATIENT
Start: 2018-06-01 | End: 2022-09-22

## 2018-06-01 RX ORDER — RIZATRIPTAN BENZOATE 10 MG/1
10 TABLET, ORALLY DISINTEGRATING ORAL
Qty: 6 TABLET | Refills: 3 | Status: SHIPPED | OUTPATIENT
Start: 2018-06-01 | End: 2018-12-31

## 2018-06-01 RX ORDER — CYCLOBENZAPRINE HCL 10 MG
5-10 TABLET ORAL 2 TIMES DAILY PRN
Qty: 30 TABLET | Refills: 0 | Status: SHIPPED | OUTPATIENT
Start: 2018-06-01 | End: 2018-10-05

## 2018-06-01 ASSESSMENT — PAIN SCALES - GENERAL: PAINLEVEL: NO PAIN (0)

## 2018-06-01 NOTE — MR AVS SNAPSHOT
After Visit Summary   6/1/2018    Christina Guzman    MRN: [id number]           Patient Information     Date Of Birth          1965        Visit Information        Provider Department      6/1/2018 8:50 AM Erika Dillon APRN CNP [facility]        Today's Diagnoses     Encounter for gynecological examination without abnormal finding    -  1    Screening for human immunodeficiency virus        History of allergic reaction        CARDIOVASCULAR SCREENING; LDL GOAL LESS THAN 160        Screening for diabetes mellitus        Family history of malignant neoplasm of ovary        Diarrhea, unspecified type        Left low back pain, unspecified chronicity, with sciatica presence unspecified        Migraine with aura and without status migrainosus, not intractable           Follow-ups after your visit        Additional Services     GASTROENTEROLOGY ADULT REF CONSULT ONLY       Preferred Location: [facility] [place], [facility]: [phone] and [place] [facility] [phone]    Please be aware that coverage of these services is subject to the terms and limitations of your health insurance plan.  Call member services at your health plan with any benefit or coverage questions.  Any procedures must be performed at a [place] facility OR coordinated by your clinic's referral office.    Please bring the following with you to your appointment:    (1) Any X-Rays, CTs or MRIs which have been performed.  Contact the facility where they were done to arrange for  prior to your scheduled appointment.    (2) List of current medications   (3) This referral request   (4) Any documents/labs given to you for this referral                  Who to contact     If you have questions or need follow up information about today's clinic visit or your schedule please contact [facility] directly at [phone].  Normal or non-critical lab and imaging results will be communicated to you by Sara  "letter or phone within 4 business days after the clinic has received the results. If you do not hear from us within 7 days, please contact the clinic through [employer] or phone. If you have a critical or abnormal lab result, we will notify you by phone as soon as possible.  Submit refill requests through [employer] or call your pharmacy and they will forward the refill request to us. Please allow 3 business days for your refill to be completed.          Additional Information About Your Visit        [employer] Information     [employer] gives you secure access to your electronic health record. If you see a primary care provider, you can also send messages to your care team and make appointments. If you have questions, please call your primary care clinic.  If you do not have a primary care provider, please call [phone] and they will assist you.        Care EveryWhere ID     This is your Care EveryWhere ID. This could be used by other organizations to access your [place] medical records  [id number]        Your Vitals Were     Pulse Temperature Height Last Period Pulse Oximetry BMI (Body Mass Index)    97 98  F (36.7  C) (Oral) 5' 5\" (1.651 m) 05/25/2018 (Exact Date) 97% 31.15 kg/m2       Blood Pressure from Last 3 Encounters:   06/01/18 118/79   12/19/17 128/86   04/04/17 141/85    Weight from Last 3 Encounters:   06/01/18 187 lb 3.2 oz (84.9 kg)   12/19/17 177 lb (80.3 kg)   04/04/17 193 lb 12.8 oz (87.9 kg)              We Performed the Following          GASTROENTEROLOGY ADULT REF CONSULT ONLY     Glucose     HIV Antigen Antibody Combo     Lipid panel reflex to direct LDL Fasting          Today's Medication Changes          These changes are accurate as of 6/1/18  9:53 AM.  If you have any questions, ask your nurse or doctor.               Stop taking these medicines if you haven't already. Please contact your care team if you have questions.     ranitidine 300 MG tablet   Commonly known as:  ZANTAC   Stopped " by:  Erika Dillon APRN CNP                Where to get your medicines      These medications were sent to [facility] [address] [facility] [address]  [address], [facility] [address]     Phone:  [phone]     cyclobenzaprine 10 MG tablet    EPINEPHrine 0.3 MG/0.3ML injection 2-pack    rizatriptan 10 MG ODT tab                Primary Care Provider Office Phone # Fax #    [facility] [phone] [address] [place] [address]        Equal Access to Services     CLIVE GUZMAN : Hadii santos valenciao Sowoody, waaxda luqadaha, qaybta kaalmada adeegyada, keesha matos . So [facility] [phone].    ATENCIÓN: Si habla español, tiene a robb disposición servicios gratuitos de asistencia lingüística. Llame al [phone].    We comply with applicable federal civil rights laws and [place] laws. We do not discriminate on the basis of race, color, national origin, age, disability, sex, sexual orientation, or gender identity.            Thank you!     Thank you for choosing [facility]  for your care. Our goal is always to provide you with excellent care. Hearing back from our patients is one way we can continue to improve our services. Please take a few minutes to complete the written survey that you may receive in the mail after your visit with us. Thank you!             Your Updated Medication List - Protect others around you: Learn how to safely use, store and throw away your medicines at www.disposemymeds.org.          This list is accurate as of 6/1/18  9:53 AM.  Always use your most recent med list.                   Brand Name Dispense Instructions for use Diagnosis    cyclobenzaprine 10 MG tablet    FLEXERIL    30 tablet    Take 0.5-1 tablets (5-10 mg) by mouth 2 times daily as needed for muscle spasms    Left low back pain, unspecified chronicity, with sciatica  presence unspecified       EPINEPHrine 0.3 MG/0.3ML injection 2-pack    EPIPEN/ADRENACLICK/or ANY BX GENERIC EQUIV    0.3 mL    Inject 0.3 mLs (0.3 mg) into the muscle once as needed for anaphylaxis    History of allergic reaction       oxyCODONE-acetaminophen 5-325 MG per tablet    PERCOCET    30 tablet    Take 1 tablet by mouth every 6 hours as needed for pain    Left low back pain, unspecified chronicity, with sciatica presence unspecified       rizatriptan 10 MG ODT tab    MAXALT-MLT    6 tablet    Take 1 tablet (10 mg) by mouth at onset of headache for migraine May repeat in 2 hours if needed: max 2/day    Migraine with aura and without status migrainosus, not intractable

## 2018-06-01 NOTE — NURSING NOTE
"Chief Complaint   Patient presents with     Physical       Initial /79  Pulse 97  Temp 98  F (36.7  C) (Oral)  Ht 5' 5\" (1.651 m)  Wt 187 lb 3.2 oz (84.9 kg)  LMP 05/25/2018 (Exact Date)  SpO2 97%  BMI 31.15 kg/m2 Estimated body mass index is 31.15 kg/(m^2) as calculated from the following:    Height as of this encounter: 5' 5\" (1.651 m).    Weight as of this encounter: 187 lb 3.2 oz (84.9 kg)..  BP completed using cuff size: moses Reyez CMA    "

## 2018-06-01 NOTE — PROGRESS NOTES
SUBJECTIVE:   CC: Christina Guzman is an 52 year old woman who presents for preventive health visit.     Physical   Annual:     Getting at least 3 servings of Calcium per day::  Yes    Bi-annual eye exam::  Yes    Dental care twice a year::  Yes    Sleep apnea or symptoms of sleep apnea::  Daytime drowsiness    Diet::  Regular (no restrictions)    Frequency of exercise::  None    Taking medications regularly::  Not Applicable    Additional concerns today::  YES            Patient had a menses in October, then no cycles until last week. Normal menstrual flow, duration. Had not needed her Maxalt since last fall and did have a migraine with her menstrual cycle. Needs Maxalt refill.    Again requests Ca-125 screening due to family history, she is aware of the limitations of this test.    Also, having worsening Gi symptoms. Waking up every morning between 2-3am and having 2 episodes of diarrhea. More gas pains, bloating. Believes she is overdue for colonoscopy.    Today's PHQ-2 Score:   PHQ-2 ( 1999 Pfizer) 6/1/2018   Q1: Little interest or pleasure in doing things 0   Q2: Feeling down, depressed or hopeless 0   PHQ-2 Score 0   Q1: Little interest or pleasure in doing things Not at all   Q2: Feeling down, depressed or hopeless Not at all   PHQ-2 Score 0       Abuse: Current or Past(Physical, Sexual or Emotional)- No  Do you feel safe in your environment - Yes    Social History   Substance Use Topics     Smoking status: Current Every Day Smoker     Packs/day: 0.50     Years: 30.00     Types: Cigarettes     Smokeless tobacco: Never Used     Alcohol use Yes      Comment: occ     Alcohol Use 6/1/2018   If you drink alcohol do you typically have greater than 3 drinks per day OR greater than 7 drinks per week? No   No flowsheet data found.    Reviewed orders with patient.  Reviewed health maintenance and updated orders accordingly - Yes  Patient Active Problem List   Diagnosis     Tobacco abuse     Chronic idiopathic  urticaria     House dust mite allergy     CARDIOVASCULAR SCREENING; LDL GOAL LESS THAN 160     Migraine headache     Low back pain     Menopausal depression     Menopausal syndrome     Vitamin D deficiency     GERD (gastroesophageal reflux disease)     Toe pain     Skin tag     Low back pain, unspecified back pain laterality, unspecified chronicity, with sciatica presence unspecified     Past Surgical History:   Procedure Laterality Date     C ORAL SURGERY PROCEDURE  2017    Cyst removed from abscess in tooth     ENT SURGERY  2017    Oral surgery     ORTHOPEDIC SURGERY  2014    bone spur shaved from left big toe     TOE SURGERY      Left foot       Social History   Substance Use Topics     Smoking status: Current Every Day Smoker     Packs/day: 0.50     Years: 30.00     Types: Cigarettes     Smokeless tobacco: Never Used     Alcohol use Yes      Comment: occ     Family History   Problem Relation Age of Onset     CANCER Mother      OVARIAN,  age 46,48     Other Cancer Mother      Ovarian     Respiratory Father      CANCER Father      Lung     Hypertension Father      Obesity Father      DIABETES Maternal Grandmother      Breast Cancer Maternal Grandmother      DIABETES Maternal Grandfather      DIABETES Paternal Grandmother      DIABETES Paternal Grandfather            Patient over age 50, mutual decision to screen reflected in health maintenance.    Pertinent mammograms are reviewed under the imaging tab.  Last 3 Pap and HPV Results:   PAP / HPV Latest Ref Rng & Units 2017 2013 9/10/2010   PAP - NIL NIL NIL   HPV 16 DNA NEG Negative - -   HPV 18 DNA NEG Negative - -   OTHER HR HPV NEG Negative - -       Reviewed and updated as needed this visit by clinical staff  Tobacco  Allergies  Meds  Problems  Med Hx  Surg Hx  Fam Hx  Soc Hx          Reviewed and updated as needed this visit by Provider  Allergies  Problems        Past Medical History:   Diagnosis Date     Chronic idiopathic  "urticaria x 9/09    IgE 638, Tryptase=11 (min. elevated)     Fructose disorder     Fructose Malabsorbtion     House dust mite allergy 9/7/10 [facility]ST    DM only     Menopausal depression 12/11/2012     Migraines       Past Surgical History:   Procedure Laterality Date     C ORAL SURGERY PROCEDURE  04/03/2017    Cyst removed from abscess in tooth     ENT SURGERY  04/01/2017    Oral surgery     ORTHOPEDIC SURGERY  12/2014    bone spur shaved from left big toe     TOE SURGERY      Left foot       Review of Systems  CONSTITUTIONAL: NEGATIVE for fever, chills, change in weight  INTEGUMENTARU/SKIN: NEGATIVE for worrisome rashes, moles or lesions  EYES: NEGATIVE for vision changes or irritation  ENT: NEGATIVE for ear, mouth and throat problems  RESP: NEGATIVE for significant cough or SOB  BREAST: NEGATIVE for masses, tenderness or discharge  CV: NEGATIVE for chest pain, palpitations or peripheral edema  GI: NEGATIVE for nausea, abdominal pain, heartburn, and POSITIVE for change in bowel habits  : NEGATIVE for unusual urinary or vaginal symptoms. Periods are irregular.  MUSCULOSKELETAL: NEGATIVE for significant arthralgias or myalgia  NEURO: NEGATIVE for weakness, dizziness or paresthesias  PSYCHIATRIC: NEGATIVE for changes in mood or affect     OBJECTIVE:   /79  Pulse 97  Temp 98  F (36.7  C) (Oral)  Ht 5' 5\" (1.651 m)  Wt 187 lb 3.2 oz (84.9 kg)  LMP 05/25/2018 (Exact Date)  SpO2 97%  BMI 31.15 kg/m2  Physical Exam  GENERAL: healthy, alert and no distress  EYES: Eyes grossly normal to inspection, PERRL and conjunctivae and sclerae normal  HENT: ear canals and TM's normal, nose and mouth without ulcers or lesions  NECK: no adenopathy, no asymmetry, masses, or scars and thyroid normal to palpation  RESP: lungs clear to auscultation - no rales, rhonchi or wheezes  BREAST: normal without masses, tenderness or nipple discharge and no palpable axillary masses or adenopathy  CV: regular rate and rhythm, normal S1 S2, " no S3 or S4, no murmur, click or rub, no peripheral edema and peripheral pulses strong  ABDOMEN: soft, nontender, no hepatosplenomegaly, no masses and bowel sounds normal   (female): normal female external genitalia, normal urethral meatus, vaginal mucosa pink, moist, well rugated, and normal cervix/adnexa/uterus without masses or discharge  MS: no gross musculoskeletal defects noted, no edema  SKIN: no suspicious lesions or rashes  NEURO: Normal strength and tone, mentation intact and speech normal  PSYCH: mentation appears normal, affect normal/bright    ASSESSMENT/PLAN:   1. Encounter for gynecological examination without abnormal finding  Pap smear up to date    2. Screening for human immunodeficiency virus  - HIV Antigen Antibody Combo    3. History of allergic reaction  - EPINEPHrine (EPIPEN/ADRENACLICK/OR ANY BX GENERIC EQUIV) 0.3 MG/0.3ML injection 2-pack; Inject 0.3 mLs (0.3 mg) into the muscle once as needed for anaphylaxis  Dispense: 0.3 mL; Refill: 2    4. CARDIOVASCULAR SCREENING; LDL GOAL LESS THAN 160  - Lipid panel reflex to direct LDL Fasting    5. Screening for diabetes mellitus  - Glucose    6. Family history of malignant neoplasm of ovary  - Patient is aware of limitations of testing and would like to still have this completed today.  -     7. Diarrhea, unspecified type  Discussed her increase in symptoms, recommend GI consult, referral entered.  - GASTROENTEROLOGY ADULT REF CONSULT ONLY    8. Left low back pain, unspecified chronicity, with sciatica presence unspecified  -uses sparingly for flares in her pain. Refill x 1.  - cyclobenzaprine (FLEXERIL) 10 MG tablet; Take 0.5-1 tablets (5-10 mg) by mouth 2 times daily as needed for muscle spasms  Dispense: 30 tablet; Refill: 0    9. Migraine with aura and without status migrainosus, not intractable  Has not had much for symptoms without cycles, but did need it with last cycle, will refill.  - rizatriptan (MAXALT-MLT) 10 MG ODT tab; Take 1  "tablet (10 mg) by mouth at onset of headache for migraine May repeat in 2 hours if needed: max 2/day  Dispense: 6 tablet; Refill: 3    COUNSELING:  Reviewed preventive health counseling, as reflected in patient instructions  Special attention given to:        Regular exercise       Healthy diet/nutrition       Colon cancer screening       HIV screeninx in teen years, 1x in adult years, and at intervals if high risk       (Debbie)menopause management         reports that she has been smoking Cigarettes.  She has a 15.00 pack-year smoking history. She has never used smokeless tobacco.  Tobacco Cessation Action Plan: Information offered: Patient not interested at this time  Estimated body mass index is 31.15 kg/(m^2) as calculated from the following:    Height as of this encounter: 5' 5\" (1.651 m).    Weight as of this encounter: 187 lb 3.2 oz (84.9 kg).       Counseling Resources:  ATP IV Guidelines  Pooled Cohorts Equation Calculator  Breast Cancer Risk Calculator  FRAX Risk Assessment  ICSI Preventive Guidelines  Dietary Guidelines for Americans, 2010  USDA's MyPlate  ASA Prophylaxis  Lung CA Screening    MARIANO Wilder CNP  [facility]  "

## 2018-06-04 LAB — HIV 1+2 AB+HIV1 P24 AG SERPL QL IA: NONREACTIVE

## 2018-06-12 ENCOUNTER — TRANSFERRED RECORDS (OUTPATIENT)
Dept: HEALTH INFORMATION MANAGEMENT | Facility: CLINIC | Age: 53
End: 2018-06-12

## 2018-07-06 ENCOUNTER — TRANSFERRED RECORDS (OUTPATIENT)
Dept: HEALTH INFORMATION MANAGEMENT | Facility: CLINIC | Age: 53
End: 2018-07-06

## 2018-08-27 ENCOUNTER — OFFICE VISIT (OUTPATIENT)
Dept: URGENT CARE | Facility: URGENT CARE | Age: 53
End: 2018-08-27
Payer: COMMERCIAL

## 2018-08-27 VITALS
SYSTOLIC BLOOD PRESSURE: 120 MMHG | HEART RATE: 80 BPM | DIASTOLIC BLOOD PRESSURE: 80 MMHG | OXYGEN SATURATION: 98 % | TEMPERATURE: 97.8 F | BODY MASS INDEX: 32.78 KG/M2 | WEIGHT: 197 LBS

## 2018-08-27 DIAGNOSIS — M54.2 NECK PAIN: Primary | ICD-10-CM

## 2018-08-27 PROCEDURE — 99213 OFFICE O/P EST LOW 20 MIN: CPT | Performed by: PHYSICIAN ASSISTANT

## 2018-08-27 RX ORDER — OMEPRAZOLE 40 MG/1
CAPSULE, DELAYED RELEASE ORAL
COMMUNITY
Start: 2018-08-26 | End: 2019-01-10

## 2018-08-27 RX ORDER — PREDNISONE 20 MG/1
TABLET ORAL
Qty: 15 TABLET | Refills: 0 | Status: SHIPPED | OUTPATIENT
Start: 2018-08-27 | End: 2018-10-05

## 2018-08-27 RX ORDER — HYDROCODONE BITARTRATE AND ACETAMINOPHEN 5; 325 MG/1; MG/1
1 TABLET ORAL EVERY 6 HOURS PRN
Qty: 10 TABLET | Refills: 0 | Status: SHIPPED | OUTPATIENT
Start: 2018-08-27 | End: 2018-10-05

## 2018-08-27 ASSESSMENT — ENCOUNTER SYMPTOMS
FATIGUE: 0
EYE PAIN: 0
PALPITATIONS: 0
NAUSEA: 0
CHILLS: 0
RHINORRHEA: 0
UNEXPECTED WEIGHT CHANGE: 0
SINUS PRESSURE: 0
ABDOMINAL PAIN: 0
SHORTNESS OF BREATH: 0
BACK PAIN: 0
SORE THROAT: 0
FEVER: 0
WHEEZING: 0
COUGH: 0
TROUBLE SWALLOWING: 0
DIARRHEA: 0
CHEST TIGHTNESS: 0
VOMITING: 0
ARTHRALGIAS: 0
MYALGIAS: 0
EYE REDNESS: 0

## 2018-08-27 ASSESSMENT — PAIN SCALES - GENERAL: PAINLEVEL: EXTREME PAIN (8)

## 2018-08-27 NOTE — NURSING NOTE
"Chief Complaint   Patient presents with     Neck Pain     C/o right sided neck pain for a couple weeks. Thinks it is affecting her vision on the right side. Taking IBU and not helping anymore.       Initial /80  Pulse 80  Temp 97.8  F (36.6  C) (Oral)  Wt 197 lb (89.4 kg)  SpO2 98%  BMI 32.78 kg/m2 Estimated body mass index is 32.78 kg/(m^2) as calculated from the following:    Height as of 6/1/18: 5' 5\" (1.651 m).    Weight as of this encounter: 197 lb (89.4 kg)..  BP completed using cuff size: moses Quezada CMA    "

## 2018-08-27 NOTE — MR AVS SNAPSHOT
After Visit Summary   8/27/2018    Christina Guzman    MRN: [id number]           Patient Information     Date Of Birth          1965        Visit Information        Provider Department      8/27/2018 5:25 PM Jannet Colmenares PA-C [facility]        Today's Diagnoses     Neck pain    -  1       Follow-ups after your visit        Follow-up notes from your care team     Return if symptoms worsen or fail to improve.      Who to contact     If you have questions or need follow up information about today's clinic visit or your schedule please contact [facility] directly at [phone].  Normal or non-critical lab and imaging results will be communicated to you by MyChart, letter or phone within 4 business days after the clinic has received the results. If you do not hear from us within 7 days, please contact the clinic through Formotushart or phone. If you have a critical or abnormal lab result, we will notify you by phone as soon as possible.  Submit refill requests through DrDoctor or call your pharmacy and they will forward the refill request to us. Please allow 3 business days for your refill to be completed.          Additional Information About Your Visit        MyChart Information     DrDoctor gives you secure access to your electronic health record. If you see a primary care provider, you can also send messages to your care team and make appointments. If you have questions, please call your primary care clinic.  If you do not have a primary care provider, please call [phone] and they will assist you.        Care EveryWhere ID     This is your Care EveryWhere ID. This could be used by other organizations to access your [place] medical records  [id number]        Your Vitals Were     Pulse Temperature Pulse Oximetry BMI (Body Mass Index)          80 97.8  F (36.6  C) (Oral) 98% 32.78 kg/m2         Blood Pressure from Last 3 Encounters:   08/27/18 120/80   06/01/18  118/79   12/19/17 128/86    Weight from Last 3 Encounters:   08/27/18 197 lb (89.4 kg)   06/01/18 187 lb 3.2 oz (84.9 kg)   12/19/17 177 lb (80.3 kg)              Today, you had the following     No orders found for display         Today's Medication Changes          These changes are accurate as of 8/27/18 11:59 PM.  If you have any questions, ask your nurse or doctor.               Start taking these medicines.        Dose/Directions    HYDROcodone-acetaminophen 5-325 MG per tablet   Commonly known as:  NORCO   Used for:  Neck pain   Started by:  Jannet Colmenares PA-C        Dose:  1 tablet   Take 1 tablet by mouth every 6 hours as needed for severe pain   Quantity:  10 tablet   Refills:  0       predniSONE 20 MG tablet   Commonly known as:  DELTASONE   Used for:  Neck pain   Started by:  Jannet Colmenares PA-C        Take 2 tablets by mouth once daily x 5 days then 1 tablet by mouth once daily x 5 days   Quantity:  15 tablet   Refills:  0            Where to get your medicines      These medications were sent to [facility] [address] [place] [address] AT [address] [address]     Phone:  [phone]     predniSONE 20 MG tablet         Some of these will need a paper prescription and others can be bought over the counter.  Ask your nurse if you have questions.     Bring a paper prescription for each of these medications     HYDROcodone-acetaminophen 5-325 MG per tablet               Information about OPIOIDS     PRESCRIPTION OPIOIDS: WHAT YOU NEED TO KNOW   We gave you an opioid (narcotic) pain medicine. It is important to manage your pain, but opioids are not always the best choice. You should first try all the other options your care team gave you. Take this medicine for as short a time (and as few doses) as possible.    Some activities can increase your pain, such as bandage changes or therapy sessions. It may help to take your  pain medicine 30 to 60 minutes before these activities. Reduce your stress by getting enough sleep, working on hobbies you enjoy and practicing relaxation or meditation. Talk to your care team about ways to manage your pain beyond prescription opioids.    These medicines have risks:    DO NOT drive when on new or higher doses of pain medicine. These medicines can affect your alertness and reaction times, and you could be arrested for driving under the influence (DUI). If you need to use opioids long-term, talk to your care team about driving.    DO NOT operate heavy machinery    DO NOT do any other dangerous activities while taking these medicines.    DO NOT drink any alcohol while taking these medicines.     If the opioid prescribed includes acetaminophen, DO NOT take with any other medicines that contain acetaminophen. Read all labels carefully. Look for the word  acetaminophen  or  Tylenol.  Ask your pharmacist if you have questions or are unsure.    You can get addicted to pain medicines, especially if you have a history of addiction (chemical, alcohol or substance dependence). Talk to your care team about ways to reduce this risk.    All opioids tend to cause constipation. Drink plenty of water and eat foods that have a lot of fiber, such as fruits, vegetables, prune juice, apple juice and high-fiber cereal. Take a laxative (Miralax, milk of magnesia, Colace, Senna) if you don t move your bowels at least every other day. Other side effects include upset stomach, sleepiness, dizziness, throwing up, tolerance (needing more of the medicine to have the same effect), physical dependence and slowed breathing.    Store your pills in a secure place, locked if possible. We will not replace any lost or stolen medicine. If you don t finish your medicine, please throw away (dispose) as directed by your pharmacist. The [place] Pollution Control Agency has more information about safe disposal:  [email or web address]         Primary Care Provider Office Phone # Fax #    [facility] [phone] [address] [facility] [address]        Equal Access to Services     CLIVE GUZMAN : Mady graham ku hadasho Soomaali, waaxda luqadaha, qaybta kaalmada adeegyada, waxeleanor pato ronnn anamariamargarita hillman carlo peraza. So [facility] [phone].    ATENCIÓN: Si habla español, tiene a robb disposición servicios gratuitos de asistencia lingüística. Radha al [phone].    We comply with applicable federal civil rights laws and [place] laws. We do not discriminate on the basis of race, color, national origin, age, disability, sex, sexual orientation, or gender identity.            Thank you!     Thank you for choosing [facility]  for your care. Our goal is always to provide you with excellent care. Hearing back from our patients is one way we can continue to improve our services. Please take a few minutes to complete the written survey that you may receive in the mail after your visit with us. Thank you!             Your Updated Medication List - Protect others around you: Learn how to safely use, store and throw away your medicines at www.disposemymeds.org.          This list is accurate as of 8/27/18 11:59 PM.  Always use your most recent med list.                   Brand Name Dispense Instructions for use Diagnosis    cyclobenzaprine 10 MG tablet    FLEXERIL    30 tablet    Take 0.5-1 tablets (5-10 mg) by mouth 2 times daily as needed for muscle spasms    Left low back pain, unspecified chronicity, with sciatica presence unspecified       EPINEPHrine 0.3 MG/0.3ML injection 2-pack    EPIPEN/ADRENACLICK/or ANY BX GENERIC EQUIV    0.3 mL    Inject 0.3 mLs (0.3 mg) into the muscle once as needed for anaphylaxis    History of allergic reaction       HYDROcodone-acetaminophen 5-325 MG per tablet    NORCO    10 tablet    Take 1 tablet by mouth  every 6 hours as needed for severe pain    Neck pain       hyoscyamine 0.125 MG sublingual tablet    LEVSIN/SL          omeprazole 40 MG capsule    priLOSEC          oxyCODONE-acetaminophen 5-325 MG per tablet    PERCOCET    30 tablet    Take 1 tablet by mouth every 6 hours as needed for pain    Left low back pain, unspecified chronicity, with sciatica presence unspecified       predniSONE 20 MG tablet    DELTASONE    15 tablet    Take 2 tablets by mouth once daily x 5 days then 1 tablet by mouth once daily x 5 days    Neck pain       rizatriptan 10 MG ODT tab    MAXALT-MLT    6 tablet    Take 1 tablet (10 mg) by mouth at onset of headache for migraine May repeat in 2 hours if needed: max 2/day    Migraine with aura and without status migrainosus, not intractable

## 2018-08-27 NOTE — PROGRESS NOTES
SUBJECTIVE:   Christina Guzman is a 53 year old female presenting with a chief complaint of   Chief Complaint   Patient presents with     Neck Pain     C/o right sided neck pain for a couple weeks. Thinks it is affecting her vision on the right side. Taking IBU and not helping anymore.       She is an established patient of [facility].    MS Injury/Pain    Onset of symptoms was 2 week(s) ago.  Location: right neck  Context:       The injury happened while at home      Mechanism: possibly from lifting grandchildren      Patient experienced delayed pain  Course of symptoms is same.    Severity moderate  Current and Associated symptoms: Pain  Denies  Bruising, Warmth and Redness  Aggravating Factors: movement, twisting and flexion/extension  Therapies to improve symptoms include: ibuprofen  This is not the first time this type of problem has occurred for this patient.       Review of Systems   Constitutional: Negative for chills, fatigue, fever and unexpected weight change.   HENT: Negative for congestion, ear pain, postnasal drip, rhinorrhea, sinus pressure, sore throat and trouble swallowing.    Eyes: Negative for pain, redness and visual disturbance.   Respiratory: Negative for cough, chest tightness, shortness of breath and wheezing.    Cardiovascular: Negative for chest pain and palpitations.   Gastrointestinal: Negative for abdominal pain, diarrhea, nausea and vomiting.   Musculoskeletal: Positive for neck pain. Negative for arthralgias, back pain and myalgias.   Skin: Negative for rash.       Past Medical History:   Diagnosis Date     Chronic idiopathic urticaria x     IgE 638, Tryptase=11 (min. elevated)     Fructose disorder     Fructose Malabsorbtion     House dust mite allergy 9/7/10 RAST    DM only     Menopausal depression 2012     Migraines      Family History   Problem Relation Age of Onset     Cancer Mother      OVARIAN,  age 46,48     Other Cancer Mother      Ovarian     Respiratory  Father      Cancer Father      Lung     Hypertension Father      Obesity Father      Diabetes Maternal Grandmother      Breast Cancer Maternal Grandmother      Diabetes Maternal Grandfather      Diabetes Paternal Grandmother      Diabetes Paternal Grandfather      Current Outpatient Prescriptions   Medication Sig Dispense Refill     cyclobenzaprine (FLEXERIL) 10 MG tablet Take 0.5-1 tablets (5-10 mg) by mouth 2 times daily as needed for muscle spasms 30 tablet 0     EPINEPHrine (EPIPEN/ADRENACLICK/OR ANY BX GENERIC EQUIV) 0.3 MG/0.3ML injection 2-pack Inject 0.3 mLs (0.3 mg) into the muscle once as needed for anaphylaxis 0.3 mL 2     HYDROcodone-acetaminophen (NORCO) 5-325 MG per tablet Take 1 tablet by mouth every 6 hours as needed for severe pain 10 tablet 0     omeprazole (PRILOSEC) 40 MG capsule        predniSONE (DELTASONE) 20 MG tablet Take 2 tablets by mouth once daily x 5 days then 1 tablet by mouth once daily x 5 days 15 tablet 0     rizatriptan (MAXALT-MLT) 10 MG ODT tab Take 1 tablet (10 mg) by mouth at onset of headache for migraine May repeat in 2 hours if needed: max 2/day 6 tablet 3     hyoscyamine (LEVSIN/SL) 0.125 MG sublingual tablet        oxyCODONE-acetaminophen (PERCOCET) 5-325 MG per tablet Take 1 tablet by mouth every 6 hours as needed for pain (Patient not taking: Reported on 6/1/2018) 30 tablet 0     Social History   Substance Use Topics     Smoking status: Current Every Day Smoker     Packs/day: 0.50     Years: 30.00     Types: Cigarettes     Smokeless tobacco: Never Used     Alcohol use Yes      Comment: occ       OBJECTIVE  /80  Pulse 80  Temp 97.8  F (36.6  C) (Oral)  Wt 197 lb (89.4 kg)  SpO2 98%  BMI 32.78 kg/m2    Physical Exam   Constitutional: She appears well-developed and well-nourished.   HENT:   Head: Normocephalic.   Right Ear: Tympanic membrane and ear canal normal.   Left Ear: Tympanic membrane and ear canal normal.   Mouth/Throat: Oropharynx is clear and moist.    Eyes: Conjunctivae are normal. Pupils are equal, round, and reactive to light.   Neck:   No obvious deformity, erythema, edema, or ecchymosis of the cervical or thoracic spine. Tenderness with palpation along the right side of cervical spine and surrounding musculature. Upper extremity CMS intact.    Cardiovascular: Normal rate and normal heart sounds.    Pulmonary/Chest: Effort normal and breath sounds normal.   Skin: Skin is warm and dry. No rash noted.   Psychiatric: She has a normal mood and affect. Her behavior is normal.       Labs:  No results found for this or any previous visit (from the past 24 hour(s)).    X-Ray was not done.    ASSESSMENT:      ICD-10-CM    1. Neck pain M54.2 predniSONE (DELTASONE) 20 MG tablet     HYDROcodone-acetaminophen (NORCO) 5-325 MG per tablet        Medical Decision Making:    Differential Diagnosis:  Back Pain: musculoskeletal strain, bulging disc, herniated disc    Serious Comorbid Conditions:  Adult:  None    PLAN:    Neck Pain: ice, heat, rest, stretching and Rx: Will treat with prednisone 40mg once daily x 5 days then 20mg once daily x 5 days. Also prescribed Norco 5/325mg #10 with no refills. Would recommend PT or f/u with PCP if symptoms worsen or do not improve.      Followup:    If not improving or if condition worsens, follow up with your Primary Care Provider    There are no Patient Instructions on file for this visit.

## 2018-08-28 ASSESSMENT — ENCOUNTER SYMPTOMS: NECK PAIN: 1

## 2018-09-25 ENCOUNTER — NURSE TRIAGE (OUTPATIENT)
Dept: NURSING | Facility: CLINIC | Age: 53
End: 2018-09-25

## 2018-09-25 NOTE — TELEPHONE ENCOUNTER
FNA triage call :   Presenting problem : [place] [facility]  warm conferenced call from Pt who reports her     said = she had a   Mini stroke 9/21/18  .  No symptoms now  .   reported  on  9/21/18  the left side was not moving  and speech was off - Pt remembered that   wanted to call 911 but she refused  and she  didn t remember problem with paralysis and speech but was drinking ETOH and was incontinent of urine  and thinks now in hind sight that she was confused  .  Currently  : at work and moving all extremities and speech seems ok at work and  has not noted any of this symptoms anymore since 9/21/18  .  Hx of 2 weeks ago had tingling  entire left arm and lost vision in R eye for 5 minutes and  intermittent for 2 weeks  vision change like a gray shade over her R eye  for   but no migraine headache  at that time   / Hx of  chronic neck and lower back pain issue .   Guideline used : neuro deficit - adult   Disposition and recommendations : Remain NPO and have someone drive you to ED now and Pt agrees to go.   Verbalizes understanding and denies further questions .  Phuong Perez RN  - [place] Nurse Advisor     Reason for Disposition    [1] Loss of control of bowel or bladder (i.e., incontinence) AND [2] new onset    [1] Weakness (i.e., paralysis, loss of muscle strength) of the face, arm / hand, or leg / foot on one side of the body AND [2] sudden onset AND [3] brief (now gone)    Additional Information    Negative: [1] SEVERE weakness (i.e., unable to walk or barely able to walk, requires support) AND [2] new onset or worsening    Negative: [1] Weakness (i.e., paralysis, loss of muscle strength) of the face, arm / hand, or leg / foot on one side of the body AND [2] sudden onset AND [3] present now    Negative: [1] Numbness (i.e., loss of sensation) of the face, arm / hand, or leg / foot on one side of the body AND [2] sudden onset AND [3] present now    Negative: [1] Loss of speech  or garbled speech AND [2] sudden onset AND [3] present now    Negative: Difficult to awaken or acting confused  (e.g., disoriented, slurred speech)    Negative: Sounds like a life-threatening emergency to the triager    Negative: Confusion, disorientation, or hallucinations is main symptom    Negative: Neck pain is main symptom (and having weakness, numbness, or tingling in arm / hand because of neck pain)    Negative: Back pain is main symptom (and having weakness, numbness, or tingling in leg because of back pain)    Negative: Hand pain is main symptom (and having mild weakness, numbness, or tingling in hand related to hand pain)    Negative: Dizziness is main symptom    Negative: Vision loss or change is main symptom    Negative: Followed a head injury within last 3 days    Negative: Followed a neck injury within last 3 days    Negative: [1] Tingling in both hands and/or feet AND [2] breathing faster than normal AND [3] feels similar to prior panic attack or hyperventilation episode    Negative: Weakness in both sides of the body or weakness all over    Negative: Headache  (and neurologic deficit)    Negative: [1] Back pain AND [2] numbness (loss of sensation) in groin or rectal area    Negative: [1] Unable to urinate (or only a few drops) > 4 hours AND [2] bladder feels very full (e.g., palpable bladder or strong urge to urinate)    Protocols used: NEUROLOGIC DEFICIT-ADULT-

## 2018-10-04 NOTE — PROGRESS NOTES
SUBJECTIVE:                                                    Christina Guzman is a 53 year old female who presents to clinic today for the following health issues:        Hospital Follow-up Visit:    Hospital/Nursing Home/IP Rehab Facility: [facility]  Date of Admission: 09/25/2018  Date of Discharge: 09/28/2018  Reason(s) for Admission: Carotid artery            Problems taking medications regularly:  None       Medication changes since discharge: None       Problems adhering to non-medication therapy:  None    Summary of hospitalization:  CareEverywhere information obtained and reviewed  Diagnostic Tests/Treatments reviewed.  Follow up needed: surgeon f/u next week  Other Healthcare Providers Involved in Patient s Care:         None  Update since discharge: improved.     Post Discharge Medication Reconciliation: discharge medications reconciled, continue medications without change.  Plan of care communicated with patient     Coding guidelines for this visit:  Type of Medical   Decision Making Face-to-Face Visit       within 7 Days of discharge Face-to-Face Visit        within 14 days of discharge   Moderate Complexity [id number]   High Complexity [id number]            Patient was admitted for TIA and had significant R sided carotid artery stenosis so she had a carotid endarterectomy as well.  Was told to take plavix for six weeks and aspirin for lifelong and statin lifelong also.     F/u with surgeon on Tuesday.  Pain is overall better.  A little bit tender today.  No stroke like symptoms anymore.     Son is with today.     Quit smoking since this happened. Encouraged this.     She is wondering about diet changes.  She will start exercising. Will refer to nutrionist.   Blood pressure has always been to goal.     Last lipid panel was poor, we went over these results in detail and what they mean today.   She has been taking her statin now.     Problem list and histories reviewed & adjusted, as  indicated.  Additional history: as documented    Patient Active Problem List   Diagnosis     Chronic idiopathic urticaria     House dust mite allergy     CARDIOVASCULAR SCREENING; LDL GOAL LESS THAN 160     Migraine headache     Low back pain     Menopausal depression     Menopausal syndrome     Vitamin D deficiency     GERD (gastroesophageal reflux disease)     Toe pain     Skin tag     Low back pain, unspecified back pain laterality, unspecified chronicity, with sciatica presence unspecified     H/O carotid endarterectomy     TIA (transient ischemic attack)     Past Surgical History:   Procedure Laterality Date     C ORAL SURGERY PROCEDURE  2017    Cyst removed from abscess in tooth     CAROTID ENDARTERECTOMY  2018     ENT SURGERY  2017    Oral surgery     ORTHOPEDIC SURGERY  2014    bone spur shaved from left big toe     TOE SURGERY      Left foot       Social History   Substance Use Topics     Smoking status: Former Smoker     Packs/day: 0.50     Years: 30.00     Types: Cigarettes     Smokeless tobacco: Never Used     Alcohol use Yes      Comment: occ     Family History   Problem Relation Age of Onset     Cancer Mother      OVARIAN,  age 46,48     Other Cancer Mother      Ovarian     Respiratory Father      Cancer Father      Lung     Hypertension Father      Obesity Father      Diabetes Maternal Grandmother      Breast Cancer Maternal Grandmother      Diabetes Maternal Grandfather      Diabetes Paternal Grandmother      Diabetes Paternal Grandfather          Current Outpatient Prescriptions   Medication Sig Dispense Refill     aspirin 81 MG chewable tablet Take 1 tablet (81 mg) by mouth daily 108 tablet 3     atorvastatin (LIPITOR) 40 MG tablet Take 40 mg by mouth       clopidogrel (PLAVIX) 75 MG tablet Take 75 mg by mouth       cyclobenzaprine (FLEXERIL) 10 MG tablet Take 0.5-1 tablets (5-10 mg) by mouth 2 times daily as needed for muscle spasms 90 tablet 1     EPINEPHrine  (EPIPEN/ADRENACLICK/OR ANY BX GENERIC EQUIV) 0.3 MG/0.3ML injection 2-pack Inject 0.3 mLs (0.3 mg) into the muscle once as needed for anaphylaxis 0.3 mL 2     hyoscyamine (LEVSIN/SL) 0.125 MG sublingual tablet        omeprazole (PRILOSEC) 40 MG capsule        rizatriptan (MAXALT-MLT) 10 MG ODT tab Take 1 tablet (10 mg) by mouth at onset of headache for migraine May repeat in 2 hours if needed: max 2/day 6 tablet 3     Allergies   Allergen Reactions     Clindamycin        ROS:  Constitutional, HEENT, cardiovascular, pulmonary, GI, , musculoskeletal, neuro, skin, endocrine and psych systems are negative, except as otherwise noted.    OBJECTIVE:     /78  Pulse 95  Temp 97.9  F (36.6  C) (Oral)  Resp 18  Wt 189 lb (85.7 kg)  SpO2 100%  Breastfeeding? No  BMI 31.45 kg/m2  Body mass index is 31.45 kg/(m^2).  GENERAL: healthy, alert and no distress  NECK: {:steri-strips present over right neck surgical area, no signs of infection. Neck range of motion normal.   RESP: lungs clear to auscultation - no rales, rhonchi or wheezes  CV: regular rate and rhythm, normal S1 S2, no S3 or S4, no murmur, click or rub, no peripheral edema and peripheral pulses strong  MS: no gross musculoskeletal defects noted, no edema  SKIN: no suspicious lesions or rashes  NEURO: Normal strength and tone, mentation intact and speech normal  PSYCH: mentation appears normal, affect normal/bright        ASSESSMENT/PLAN:     ASSESSMENT / PLAN:  (Z98.890) H/O carotid endarterectomy  (primary encounter diagnosis)  Comment:   Plan:     (M54.5) Left low back pain, unspecified chronicity, with sciatica presence unspecified  Comment: patient would like flexeril on hand for on/off back pain  Plan: cyclobenzaprine (FLEXERIL) 10 MG tablet,         aspirin 81 MG chewable tablet          See below    (E78.00) High blood cholesterol  Comment:   Plan: Lipid panel reflex to direct LDL Fasting,         Comprehensive metabolic panel, NUTRITION          REFERRAL            (G45.9) TIA (transient ischemic attack)  Comment:   Plan: NUTRITION REFERRAL        See below      Patient Instructions   Make lab only appointment fasting in 3 months to recheck cholesterol   Schedule with nutrition to decide what diet is best for you  I encourage you to stick with not smoking  Start exercising as discussed  Take Plavix for 6 weeks, aspirin and statin are lifelong          Patti Liang PA-C  [facility]

## 2018-10-05 ENCOUNTER — OFFICE VISIT (OUTPATIENT)
Dept: FAMILY MEDICINE | Facility: CLINIC | Age: 53
End: 2018-10-05
Payer: COMMERCIAL

## 2018-10-05 VITALS
TEMPERATURE: 97.9 F | HEART RATE: 95 BPM | BODY MASS INDEX: 31.45 KG/M2 | DIASTOLIC BLOOD PRESSURE: 78 MMHG | WEIGHT: 189 LBS | SYSTOLIC BLOOD PRESSURE: 116 MMHG | OXYGEN SATURATION: 100 % | RESPIRATION RATE: 18 BRPM

## 2018-10-05 DIAGNOSIS — Z98.890 H/O CAROTID ENDARTERECTOMY: Primary | ICD-10-CM

## 2018-10-05 DIAGNOSIS — E78.00 HIGH BLOOD CHOLESTEROL: ICD-10-CM

## 2018-10-05 DIAGNOSIS — G45.9 TIA (TRANSIENT ISCHEMIC ATTACK): ICD-10-CM

## 2018-10-05 DIAGNOSIS — M54.5 LEFT LOW BACK PAIN, UNSPECIFIED CHRONICITY, WITH SCIATICA PRESENCE UNSPECIFIED: ICD-10-CM

## 2018-10-05 PROCEDURE — 99214 OFFICE O/P EST MOD 30 MIN: CPT | Performed by: PHYSICIAN ASSISTANT

## 2018-10-05 RX ORDER — CLOPIDOGREL BISULFATE 75 MG/1
75 TABLET ORAL
COMMUNITY
Start: 2018-09-29 | End: 2018-12-31

## 2018-10-05 RX ORDER — CYCLOBENZAPRINE HCL 10 MG
5-10 TABLET ORAL 2 TIMES DAILY PRN
Qty: 90 TABLET | Refills: 1 | Status: SHIPPED | OUTPATIENT
Start: 2018-10-05 | End: 2018-12-31

## 2018-10-05 RX ORDER — ATORVASTATIN CALCIUM 40 MG/1
40 TABLET, FILM COATED ORAL
COMMUNITY
Start: 2018-09-28 | End: 2018-12-31

## 2018-10-05 RX ORDER — ASPIRIN 81 MG/1
81 TABLET, CHEWABLE ORAL DAILY
Qty: 108 TABLET | Refills: 3 | COMMUNITY
Start: 2018-10-05 | End: 2020-02-11

## 2018-10-05 NOTE — NURSING NOTE
"Chief Complaint   Patient presents with     Hospital F/U     [facility] F/U for carotid artery     Health Maintenance     orders pended       Initial /78  Pulse 95  Temp 97.9  F (36.6  C) (Oral)  Resp 18  Wt 189 lb (85.7 kg)  SpO2 100%  Breastfeeding? No  BMI 31.45 kg/m2 Estimated body mass index is 31.45 kg/(m^2) as calculated from the following:    Height as of 6/1/18: 5' 5\" (1.651 m).    Weight as of this encounter: 189 lb (85.7 kg).  Medication Reconciliation: complete      Doris George MA    "

## 2018-10-05 NOTE — PATIENT INSTRUCTIONS
Make lab only appointment fasting in 3 months to recheck cholesterol   Schedule with nutrition to decide what diet is best for you  I encourage you to stick with not smoking  Start exercising as discussed  Take Plavix for 6 weeks, aspirin and statin are lifelong

## 2018-10-05 NOTE — MR AVS SNAPSHOT
After Visit Summary   10/5/2018    Christina Guzman    MRN: [id number]           Patient Information     Date Of Birth          1965        Visit Information        Provider Department      10/5/2018 10:40 AM Patti Liang PA-C [facility]        Today's Diagnoses     H/O carotid endarterectomy    -  1    Left low back pain, unspecified chronicity, with sciatica presence unspecified        High blood cholesterol        TIA (transient ischemic attack)          Care Instructions    Make lab only appointment fasting in 3 months to recheck cholesterol   Schedule with nutrition to decide what diet is best for you  I encourage you to stick with not smoking  Start exercising as discussed  Take Plavix for 6 weeks, aspirin and statin are lifelong            Follow-ups after your visit        Additional Services     NUTRITION REFERRAL       Your provider has referred you to: [facility]ICK[facility]: Alfie Jean [phone]   [email or web address]    Please be aware that coverage of these services is subject to the terms and limitations of your health insurance plan.  Call member services at your health plan with any benefit or coverage questions.      Please bring the following with you to your appointment:    (1) This referral request  (2) Any documents given to you regarding this referral  (3) Any specific questions you have about diet and/or food choices                  Future tests that were ordered for you today     Open Future Orders        Priority Expected Expires Ordered    Lipid panel reflex to direct LDL Fasting Routine 1/5/2019 10/5/2019 10/5/2018    Comprehensive metabolic panel Routine 1/5/2019 10/5/2019 10/5/2018            Who to contact     If you have questions or need follow up information about today's clinic visit or your schedule please contact [facility] directly at [phone].  Normal or non-critical lab and imaging  results will be communicated to you by MyChart, letter or phone within 4 business days after the clinic has received the results. If you do not hear from us within 7 days, please contact the clinic through [employer] or phone. If you have a critical or abnormal lab result, we will notify you by phone as soon as possible.  Submit refill requests through [employer] or call your pharmacy and they will forward the refill request to us. Please allow 3 business days for your refill to be completed.          Additional Information About Your Visit        [employer] Information     [employer] gives you secure access to your electronic health record. If you see a primary care provider, you can also send messages to your care team and make appointments. If you have questions, please call your primary care clinic.  If you do not have a primary care provider, please call [phone] and they will assist you.        Care EveryWhere ID     This is your Care EveryWhere ID. This could be used by other organizations to access your [place] medical records  [id number]        Your Vitals Were     Pulse Temperature Respirations Pulse Oximetry Breastfeeding? BMI (Body Mass Index)    95 97.9  F (36.6  C) (Oral) 18 100% No 31.45 kg/m2       Blood Pressure from Last 3 Encounters:   10/05/18 116/78   08/27/18 120/80   06/01/18 118/79    Weight from Last 3 Encounters:   10/05/18 189 lb (85.7 kg)   08/27/18 197 lb (89.4 kg)   06/01/18 187 lb 3.2 oz (84.9 kg)              We Performed the Following     NUTRITION REFERRAL          Where to get your medicines      [facility] [id number] [place] [address] [address] [facility] [facility] [address] [place]  [address] [facility] [address]     Phone:  [phone]     cyclobenzaprine 10 MG tablet          Primary Care Provider Office Phone # Fax #    [facility] [phone] [address] [facility] [address]         Equal Access to Services     [facility] : Hadii aad ku hadnessaurora Marielenawoody, waaxda luqadaha, qaybta kaalmajunior elizabeth, keesha peraza. So [facility] [phone].    ATENCIÓN: Si jackla ar, tiene a robb disposición servicios gratuitos de asistencia lingüística. Tammyame al [phone].    We comply with applicable federal civil rights laws and [place] laws. We do not discriminate on the basis of race, color, national origin, age, disability, sex, sexual orientation, or gender identity.            Thank you!     Thank you for choosing [facility]  [facility]. Our goal is always to provide you with excellent care. Hearing back from our patients is one way we can continue to improve our services. Please take a few minutes to complete the written survey that you may receive in the mail after your visit with us. Thank you!             Your Updated Medication List - Protect others around you: Learn how to safely use, store and throw away your medicines at www.disposemymeds.org.          This list is accurate as of 10/5/18 11:24 AM.  Always use your most recent med list.                   Brand Name Dispense Instructions for use Diagnosis    aspirin 81 MG chewable tablet     108 tablet    Take 1 tablet (81 mg) by mouth daily    Left low back pain, unspecified chronicity, with sciatica presence unspecified       atorvastatin 40 MG tablet    LIPITOR     Take 40 mg by mouth        clopidogrel 75 MG tablet    PLAVIX     Take 75 mg by mouth        cyclobenzaprine 10 MG tablet    FLEXERIL    90 tablet    Take 0.5-1 tablets (5-10 mg) by mouth 2 times daily as needed for muscle spasms    Left low back pain, unspecified chronicity, with sciatica presence unspecified       EPINEPHrine 0.3 MG/0.3ML injection 2-pack    EPIPEN/ADRENACLICK/or ANY BX GENERIC EQUIV    0.3 mL    Inject 0.3 mLs (0.3 mg) into the muscle once as needed for anaphylaxis    History of allergic reaction       hyoscyamine  0.125 MG sublingual tablet    LEVSIN/SL          omeprazole 40 MG capsule    priLOSEC          rizatriptan 10 MG ODT tab    MAXALT-MLT    6 tablet    Take 1 tablet (10 mg) by mouth at onset of headache for migraine May repeat in 2 hours if needed: max 2/day    Migraine with aura and without status migrainosus, not intractable

## 2018-11-01 ENCOUNTER — MYC MEDICAL ADVICE (OUTPATIENT)
Dept: FAMILY MEDICINE | Facility: CLINIC | Age: 53
End: 2018-11-01

## 2018-11-01 DIAGNOSIS — E78.5 HYPERLIPIDEMIA LDL GOAL <130: Primary | ICD-10-CM

## 2018-11-01 RX ORDER — ATORVASTATIN CALCIUM 40 MG/1
40 TABLET, FILM COATED ORAL DAILY
Qty: 30 TABLET | Refills: 1 | Status: SHIPPED | OUTPATIENT
Start: 2018-11-01 | End: 2018-12-24

## 2018-11-01 NOTE — TELEPHONE ENCOUNTER
I'm ok with refill 30 days lipitor 40mg but will need follow-up labs per Patti for 90 day. Tariq Vargas MD

## 2018-12-07 DIAGNOSIS — E78.00 HIGH BLOOD CHOLESTEROL: ICD-10-CM

## 2018-12-07 LAB
ALBUMIN SERPL-MCNC: 3.5 G/DL (ref 3.4–5)
ALP SERPL-CCNC: 81 U/L (ref 40–150)
ALT SERPL W P-5'-P-CCNC: 21 U/L (ref 0–50)
ANION GAP SERPL CALCULATED.3IONS-SCNC: 7 MMOL/L (ref 3–14)
AST SERPL W P-5'-P-CCNC: 9 U/L (ref 0–45)
BILIRUB SERPL-MCNC: 0.8 MG/DL (ref 0.2–1.3)
BUN SERPL-MCNC: 10 MG/DL (ref 7–30)
CALCIUM SERPL-MCNC: 8.5 MG/DL (ref 8.5–10.1)
CHLORIDE SERPL-SCNC: 103 MMOL/L (ref 94–109)
CHOLEST SERPL-MCNC: 145 MG/DL
CO2 SERPL-SCNC: 28 MMOL/L (ref 20–32)
CREAT SERPL-MCNC: 0.68 MG/DL (ref 0.52–1.04)
GFR SERPL CREATININE-BSD FRML MDRD: >90 ML/MIN/1.7M2
GLUCOSE SERPL-MCNC: 90 MG/DL (ref 70–99)
HDLC SERPL-MCNC: 39 MG/DL
LDLC SERPL CALC-MCNC: 84 MG/DL
NONHDLC SERPL-MCNC: 106 MG/DL
POTASSIUM SERPL-SCNC: 4 MMOL/L (ref 3.4–5.3)
PROT SERPL-MCNC: 7.4 G/DL (ref 6.8–8.8)
SODIUM SERPL-SCNC: 138 MMOL/L (ref 133–144)
TRIGL SERPL-MCNC: 111 MG/DL

## 2018-12-07 PROCEDURE — 80061 LIPID PANEL: CPT | Performed by: PHYSICIAN ASSISTANT

## 2018-12-07 PROCEDURE — 36415 COLL VENOUS BLD VENIPUNCTURE: CPT | Performed by: PHYSICIAN ASSISTANT

## 2018-12-07 PROCEDURE — 80053 COMPREHEN METABOLIC PANEL: CPT | Performed by: PHYSICIAN ASSISTANT

## 2018-12-07 NOTE — LETTER
December 10, 2018    Christina Guzman  [address] [facility] [address]        Dear Christina,    Cholesterol looks a lot better. Kidney function and blood sugar normal.         If you have any questions or concerns, please call the clinic at [phone].    Sincerely,  Patti Liang PA-C    Results for orders placed or performed in visit on 12/07/18   Lipid panel reflex to direct LDL Fasting   Result Value Ref Range    Cholesterol 145 <200 mg/dL    Triglycerides 111 <150 mg/dL    HDL Cholesterol 39 (L) >49 mg/dL    LDL Cholesterol Calculated 84 <100 mg/dL    Non HDL Cholesterol 106 <130 mg/dL   Comprehensive metabolic panel   Result Value Ref Range    Sodium 138 133 - 144 mmol/L    Potassium 4.0 3.4 - 5.3 mmol/L    Chloride 103 94 - 109 mmol/L    Carbon Dioxide 28 20 - 32 mmol/L    Anion Gap 7 3 - 14 mmol/L    Glucose 90 70 - 99 mg/dL    Urea Nitrogen 10 7 - 30 mg/dL    Creatinine 0.68 0.52 - 1.04 mg/dL    GFR Estimate >90 >60 mL/min/1.7m2    GFR Estimate If Black >90 >60 mL/min/1.7m2    Calcium 8.5 8.5 - 10.1 mg/dL    Bilirubin Total 0.8 0.2 - 1.3 mg/dL    Albumin 3.5 3.4 - 5.0 g/dL    Protein Total 7.4 6.8 - 8.8 g/dL    Alkaline Phosphatase 81 40 - 150 U/L    ALT 21 0 - 50 U/L    AST 9 0 - 45 U/L

## 2018-12-10 NOTE — RESULT ENCOUNTER NOTE
Dear Christina,     Cholesterol looks a lot better. Kidney function and blood sugar normal.         If you have any questions or concerns, please call the clinic at [phone].    Sincerely,  Patti Liang PA-C

## 2018-12-21 ENCOUNTER — TELEPHONE (OUTPATIENT)
Dept: FAMILY MEDICINE | Facility: CLINIC | Age: 53
End: 2018-12-21

## 2018-12-21 NOTE — PROGRESS NOTES
"  SUBJECTIVE:                                                    Christina Guzman is a 53 year old female who presents to clinic today for the following health issues:    Dizziness  Onset: 1 month    Description:   Do you feel faint:  YES  Does it feel like the surroundings (bed, room) are moving: no   Unsteady/off balance: YES  Have you passed out or fallen: no     Intensity: moderate    Progression of Symptoms:  intermittent    Accompanying Signs & Symptoms:  Heart palpitations: YES  Nausea, vomiting: no   Weakness in arms or legs --no weakness sometimes tingling in left arm  Fatigue: YES  Vision or speech changes: no   Ringing in ears (Tinnitus): no   Hearing Loss: no     History:   Head trauma/concussion hx: no   Previous similar symptoms: no   Recent bleeding history: no     Precipitating factors:   Worse with activity or head movement: YES  Any new medications (BP?): no   Alcohol/drug abuse/withdrawal: no     Alleviating factors:   Does staying in a fixed position give relief:  YES    Therapies Tried and outcome: none    Episodes of dizziness. Happens daily. Feels like her heart is \"beating weird or skipping\". Lasts seconds to minutes at a time.   No chest pain. Does get some shortness of breath.  Worse when she is busy for example melanie shopping.   Has syncope no vertigo symptoms.    Patient has a history of TIA and carotid endardectomy.   She is on baby aspirin and statin.   Also has a h/o depression and vitamin D def.   Feels normal otherwise.   No chest pain during episodes.       Problem list and histories reviewed & adjusted, as indicated.  Additional history: as documented    Patient Active Problem List   Diagnosis     Chronic idiopathic urticaria     House dust mite allergy     CARDIOVASCULAR SCREENING; LDL GOAL LESS THAN 160     Migraine headache     Low back pain     Menopausal depression     Menopausal syndrome     Vitamin D deficiency     GERD (gastroesophageal reflux disease)     Toe pain "     Skin tag     Low back pain, unspecified back pain laterality, unspecified chronicity, with sciatica presence unspecified     H/O carotid endarterectomy     TIA (transient ischemic attack)     Past Surgical History:   Procedure Laterality Date     C ORAL SURGERY PROCEDURE  2017    Cyst removed from abscess in tooth     CAROTID ENDARTERECTOMY  2018     ENT SURGERY  2017    Oral surgery     ORTHOPEDIC SURGERY  2014    bone spur shaved from left big toe     TOE SURGERY      Left foot       Social History     Tobacco Use     Smoking status: Former Smoker     Packs/day: 0.50     Years: 30.00     Pack years: 15.00     Types: Cigarettes     Smokeless tobacco: Never Used   Substance Use Topics     Alcohol use: No     Frequency: Never     Comment: occ     Family History   Problem Relation Age of Onset     Cancer Mother         OVARIAN,  age 46,48     Other Cancer Mother         Ovarian     Respiratory Father      Cancer Father         Lung     Hypertension Father      Obesity Father      Diabetes Maternal Grandmother      Breast Cancer Maternal Grandmother      Diabetes Maternal Grandfather      Diabetes Paternal Grandmother      Diabetes Paternal Grandfather          Current Outpatient Medications   Medication Sig Dispense Refill     aspirin 81 MG chewable tablet Take 1 tablet (81 mg) by mouth daily 108 tablet 3     atorvastatin (LIPITOR) 40 MG tablet TAKE 1 TABLET BY MOUTH EVERY DAY` 90 tablet 0     cyclobenzaprine (FLEXERIL) 10 MG tablet Take 0.5-1 tablets (5-10 mg) by mouth 2 times daily as needed for muscle spasms 90 tablet 1     EPINEPHrine (EPIPEN/ADRENACLICK/OR ANY BX GENERIC EQUIV) 0.3 MG/0.3ML injection 2-pack Inject 0.3 mLs (0.3 mg) into the muscle once as needed for anaphylaxis 0.3 mL 2     hyoscyamine (LEVSIN/SL) 0.125 MG sublingual tablet        omeprazole (PRILOSEC) 40 MG capsule        Allergies   Allergen Reactions     Clindamycin        ROS:  Constitutional, HEENT, cardiovascular,  pulmonary, GI, , musculoskeletal, neuro, skin, endocrine and psych systems are negative, except as otherwise noted.    OBJECTIVE:     /83   Pulse 80   Temp 97.8  F (36.6  C) (Oral)   Wt 90.7 kg (200 lb)   SpO2 96%   BMI 33.28 kg/m    Body mass index is 33.28 kg/m .  GENERAL: healthy, alert and no distress  RESP: lungs clear to auscultation - no rales, rhonchi or wheezes  CV: regular rate and rhythm, normal S1 S2, no S3 or S4, no murmur, click or rub, no peripheral edema and peripheral pulses strong  MS: no gross musculoskeletal defects noted, no edema  NEURO: Normal strength and tone, mentation intact and speech normal  PSYCH: mentation appears normal, affect normal/bright    ekg-normal, patient not currently having symptoms    ASSESSMENT/PLAN:     ASSESSMENT / PLAN:  (R00.2) Palpitations  (primary encounter diagnosis)  Comment:   Plan: EKG 12-lead complete w/read - Clinics,         CARDIOLOGY EVAL ADULT REFERRAL          Unsure what is going on during these, will need consult and likely holter possibly also stress test    (M54.5) Left low back pain, unspecified chronicity, with sciatica presence unspecified  Comment: stable just needs refills  Plan: cyclobenzaprine (FLEXERIL) 10 MG tablet          Patient Instructions   To emergency room with worsening symptoms, chest pain, or if dizziness lasts longer than usual            Patti Liang PA-C  [facility]

## 2018-12-24 DIAGNOSIS — E78.5 HYPERLIPIDEMIA LDL GOAL <130: ICD-10-CM

## 2018-12-26 ENCOUNTER — MYC MEDICAL ADVICE (OUTPATIENT)
Dept: FAMILY MEDICINE | Facility: CLINIC | Age: 53
End: 2018-12-26

## 2018-12-26 RX ORDER — ATORVASTATIN CALCIUM 40 MG/1
TABLET, FILM COATED ORAL
Qty: 90 TABLET | Refills: 0 | Status: SHIPPED | OUTPATIENT
Start: 2018-12-26 | End: 2019-01-10

## 2018-12-31 ENCOUNTER — OFFICE VISIT (OUTPATIENT)
Dept: FAMILY MEDICINE | Facility: CLINIC | Age: 53
End: 2018-12-31
Payer: COMMERCIAL

## 2018-12-31 VITALS
HEART RATE: 80 BPM | OXYGEN SATURATION: 96 % | DIASTOLIC BLOOD PRESSURE: 83 MMHG | SYSTOLIC BLOOD PRESSURE: 126 MMHG | BODY MASS INDEX: 33.28 KG/M2 | TEMPERATURE: 97.8 F | WEIGHT: 200 LBS

## 2018-12-31 DIAGNOSIS — R00.2 PALPITATIONS: Primary | ICD-10-CM

## 2018-12-31 DIAGNOSIS — M54.5 LEFT LOW BACK PAIN, UNSPECIFIED CHRONICITY, WITH SCIATICA PRESENCE UNSPECIFIED: ICD-10-CM

## 2018-12-31 PROCEDURE — 99214 OFFICE O/P EST MOD 30 MIN: CPT | Performed by: PHYSICIAN ASSISTANT

## 2018-12-31 PROCEDURE — 93000 ELECTROCARDIOGRAM COMPLETE: CPT | Performed by: PHYSICIAN ASSISTANT

## 2018-12-31 RX ORDER — CYCLOBENZAPRINE HCL 10 MG
5-10 TABLET ORAL 2 TIMES DAILY PRN
Qty: 90 TABLET | Refills: 1 | Status: SHIPPED | OUTPATIENT
Start: 2018-12-31 | End: 2019-11-15

## 2018-12-31 SDOH — HEALTH STABILITY: MENTAL HEALTH: HOW OFTEN DO YOU HAVE A DRINK CONTAINING ALCOHOL?: NEVER

## 2019-01-03 ENCOUNTER — OFFICE VISIT (OUTPATIENT)
Dept: CARDIOLOGY | Facility: CLINIC | Age: 54
End: 2019-01-03
Payer: COMMERCIAL

## 2019-01-03 VITALS
BODY MASS INDEX: 33.78 KG/M2 | WEIGHT: 203 LBS | OXYGEN SATURATION: 96 % | SYSTOLIC BLOOD PRESSURE: 95 MMHG | DIASTOLIC BLOOD PRESSURE: 68 MMHG | HEART RATE: 80 BPM

## 2019-01-03 DIAGNOSIS — R07.89 ATYPICAL CHEST PAIN: ICD-10-CM

## 2019-01-03 DIAGNOSIS — R00.2 PALPITATIONS: Primary | ICD-10-CM

## 2019-01-03 PROCEDURE — 99204 OFFICE O/P NEW MOD 45 MIN: CPT | Performed by: INTERNAL MEDICINE

## 2019-01-03 NOTE — LETTER
1/3/2019      RE: Christina Guzman  [address] [address] [facility] [address]       Dear Colleague,    Thank you for the opportunity to participate in the care of your patient, Christina Guzman, at the [facility]. Please see a copy of my visit note below.       SUBJECTIVE:  Christina Guzman is a 53 year old female who presents for evaluation of Palpitation and chest pain.    On 2018 was admitted to [facility] with a syncopal episode. Sh was sitting and knitting. Felt uneasy and left arm went weak and passed out/ Prior to this had intermittent numbness of left arm. Evaluations lead to Rt carotid endarterectomy.     Now intermittent palpitation lasting few seconds. States it is like a muscle spasm. At times fast like fluttering  and at times like skipped beats. No associated symptoms.. No dizziness,LOC.    Not a very active person. States she get occasional chest pain at night when resting. No exertional symptoms,though not very active.    Smoker. Quit after  in August this year. Very high LDL,on statin. No other risk factors.    Patient Active Problem List    Diagnosis Date Noted     H/O carotid endarterectomy 10/05/2018     Priority: Medium     TIA (transient ischemic attack) 10/05/2018     Priority: Medium     Low back pain, unspecified back pain laterality, unspecified chronicity, with sciatica presence unspecified 2017     Priority: Medium     Skin tag 10/14/2015     Priority: Medium     Toe pain 2014     Priority: Medium     GERD (gastroesophageal reflux disease) 2013     Priority: Medium     Vitamin D deficiency 2013     Priority: Medium     Menopausal depression 2012     Priority: Medium     Menopausal syndrome 2012     Priority: Medium     Low back pain 2012     Priority: Medium     Diagnosis updated by automated process. Provider to review and  confirm.       Migraine headache 05/23/2011     Priority: Medium     Patient given Migraine Education folder and Migraine Action Plan on May 23, 2011. Madiha Ly RN     (Problem list name updated by automated process. Provider to review and confirm.)       CARDIOVASCULAR SCREENING; LDL GOAL LESS THAN 160 10/31/2010     Priority: Medium     Chronic idiopathic urticaria 10/11/2010     Priority: Medium     House dust mite allergy      Priority: Medium     DM only      .  Current Outpatient Medications   Medication Sig     aspirin 81 MG chewable tablet Take 1 tablet (81 mg) by mouth daily     atorvastatin (LIPITOR) 40 MG tablet TAKE 1 TABLET BY MOUTH EVERY DAY`     cyclobenzaprine (FLEXERIL) 10 MG tablet Take 0.5-1 tablets (5-10 mg) by mouth 2 times daily as needed for muscle spasms     omeprazole (PRILOSEC) 40 MG capsule      EPINEPHrine (EPIPEN/ADRENACLICK/OR ANY BX GENERIC EQUIV) 0.3 MG/0.3ML injection 2-pack Inject 0.3 mLs (0.3 mg) into the muscle once as needed for anaphylaxis     hyoscyamine (LEVSIN/SL) 0.125 MG sublingual tablet      No current facility-administered medications for this visit.      Past Medical History:   Diagnosis Date     Chronic idiopathic urticaria x 9/09    IgE 638, Tryptase=11 (min. elevated)     Fructose disorder     Fructose Malabsorbtion     House dust mite allergy 9/7/10 RAST    DM only     Menopausal depression 12/11/2012     Migraines      Past Surgical History:   Procedure Laterality Date     C ORAL SURGERY PROCEDURE  04/03/2017    Cyst removed from abscess in tooth     CAROTID ENDARTERECTOMY  2018     ENT SURGERY  04/01/2017    Oral surgery     ORTHOPEDIC SURGERY  12/2014    bone spur shaved from left big toe     TOE SURGERY      Left foot     Allergies   Allergen Reactions     Clindamycin      Social History     Socioeconomic History     Marital status:      Spouse name: Not on file     Number of children: Not on file     Years of education: Not on file     Highest  education level: Not on file   Social Needs     Financial resource strain: Not on file     Food insecurity - worry: Not on file     Food insecurity - inability: Not on file     Transportation needs - medical: Not on file     Transportation needs - non-medical: Not on file   Occupational History     Not on file   Tobacco Use     Smoking status: Former Smoker     Packs/day: 0.50     Years: 30.00     Pack years: 15.00     Types: Cigarettes     Smokeless tobacco: Never Used   Substance and Sexual Activity     Alcohol use: No     Frequency: Never     Comment: occ     Drug use: No     Sexual activity: Yes     Partners: Male     Birth control/protection: Male Surgical     Comment:  Vasectomy   Other Topics Concern     Parent/sibling w/ CABG, MI or angioplasty before 65F 55M? No      Service No     Blood Transfusions No     Caffeine Concern No     Occupational Exposure No     Hobby Hazards No     Sleep Concern No     Stress Concern No     Weight Concern Yes     Special Diet No     Back Care No     Exercise Yes     Bike Helmet No     Seat Belt Yes     Self-Exams Yes   Social History Narrative     Not on file     Family History   Problem Relation Age of Onset     Cancer Mother         OVARIAN,  age 46,48     Other Cancer Mother         Ovarian     Respiratory Father      Cancer Father         Lung     Hypertension Father      Obesity Father      Diabetes Maternal Grandmother      Breast Cancer Maternal Grandmother      Diabetes Maternal Grandfather      Diabetes Paternal Grandmother      Diabetes Paternal Grandfather           REVIEW OF SYSTEMS:  General: negative, fever, chills, night sweats  Skin: negative, acne, rash and scaling  Eyes: negative, double vision, eye pain and photophobia  Ears/Nose/Throat: negative, nasal congestion and purulent rhinorrhea  Respiratory: No dyspnea on exertion, No cough, No hemoptysis and negative  Cardiovascular: negative, exertional chest pain or pressure, paroxysmal  nocturnal dyspnea, dyspnea on exertion and orthopnea  Gastrointestinal: negative, dysphagia, nausea and vomiting  Genitourinary: negative, nocturia, dysuria and frequency  Musculoskeletal: negative, fracture, back pain and neck pain  Neurologic: negative, headaches and seizures  Psychiatric: negative, nervous breakdown, thoughts of self-harm and thoughts of hurting someone else  Hematologic/Lymphatic/Immunologic: negative, bleeding disorder, chills and fever  Endocrine: negative, cold intolerance, heat intolerance and hot flashes       OBJECTIVE:  Blood pressure 95/68, pulse 80, weight 92.1 kg (203 lb), SpO2 96 %, not currently breastfeeding.  General Appearance: alert and no distress  Head: Normocephalic. No masses, lesions, tenderness or abnormalities  Eyes: conjuctiva clear, PERRL, EOM intact  Ears: External ears normal. Canals clear. TM's normal.  Nose: Nares normal  Mouth: normal  Neck: Supple, no cervical adenopathy, no thyromegaly  Lungs: clear to auscultation  Cardiac: regular rate and rhythm, normal S1 and S2, no murmur  Abdomen: Soft, nontender.  Normal bowel sounds.  No hepatosplenomegaly or abnormal masses  Extremities: no peripheral edema, peripheral pulses normal  Musculoskeletal: negative  Neurological: Cranial nerves 2-12 intact, motor strength intact       ASSESSMENT/PLAN:  Patient with atypical chest pain/palpitations.  S/P Rt .  Smoker. Quit recently. High LDL on statin now.  No excess alcohol,coffee.  EKG reviewed. NSR. Normal.  Will plan for an exercise stress echo to assess atypical chesyt pain.  Need a 2 weeks of Zio to evaluate palpitations.  Per orders.   Return to Clinic PRN.      ROBERT Peñaloza MD

## 2019-01-03 NOTE — PROGRESS NOTES
SUBJECTIVE:  Christina Guzman is a 53 year old female who presents for evaluation of Palpitation and chest pain.    On 2018 was admitted to [facility] with a syncopal episode. Sh was sitting and knitting. Felt uneasy and left arm went weak and passed out/ Prior to this had intermittent numbness of left arm. Evaluations lead to Rt carotid endarterectomy.     Now intermittent palpitation lasting few seconds. States it is like a muscle spasm. At times fast like fluttering  and at times like skipped beats. No associated symptoms.. No dizziness,LOC.    Not a very active person. States she get occasional chest pain at night when resting. No exertional symptoms,though not very active.    Smoker. Quit after  in August this year. Very high LDL,on statin. No other risk factors.    Patient Active Problem List    Diagnosis Date Noted     H/O carotid endarterectomy 10/05/2018     Priority: Medium     TIA (transient ischemic attack) 10/05/2018     Priority: Medium     Low back pain, unspecified back pain laterality, unspecified chronicity, with sciatica presence unspecified 2017     Priority: Medium     Skin tag 10/14/2015     Priority: Medium     Toe pain 2014     Priority: Medium     GERD (gastroesophageal reflux disease) 2013     Priority: Medium     Vitamin D deficiency 2013     Priority: Medium     Menopausal depression 2012     Priority: Medium     Menopausal syndrome 2012     Priority: Medium     Low back pain 2012     Priority: Medium     Diagnosis updated by automated process. Provider to review and confirm.       Migraine headache 2011     Priority: Medium     Patient given Migraine Education folder and Migraine Action Plan on May 23, 2011. Madiha Ly RN     (Problem list name updated by automated process. Provider to review and confirm.)       CARDIOVASCULAR SCREENING; LDL GOAL LESS THAN 160 10/31/2010     Priority: Medium     Chronic idiopathic  urticaria 10/11/2010     Priority: Medium     House dust mite allergy      Priority: Medium     DM only      .  Current Outpatient Medications   Medication Sig     aspirin 81 MG chewable tablet Take 1 tablet (81 mg) by mouth daily     atorvastatin (LIPITOR) 40 MG tablet TAKE 1 TABLET BY MOUTH EVERY DAY`     cyclobenzaprine (FLEXERIL) 10 MG tablet Take 0.5-1 tablets (5-10 mg) by mouth 2 times daily as needed for muscle spasms     omeprazole (PRILOSEC) 40 MG capsule      EPINEPHrine (EPIPEN/ADRENACLICK/OR ANY BX GENERIC EQUIV) 0.3 MG/0.3ML injection 2-pack Inject 0.3 mLs (0.3 mg) into the muscle once as needed for anaphylaxis     hyoscyamine (LEVSIN/SL) 0.125 MG sublingual tablet      No current facility-administered medications for this visit.      Past Medical History:   Diagnosis Date     Chronic idiopathic urticaria x 9/09    IgE 638, Tryptase=11 (min. elevated)     Fructose disorder     Fructose Malabsorbtion     House dust mite allergy 9/7/10 RAST    DM only     Menopausal depression 12/11/2012     Migraines      Past Surgical History:   Procedure Laterality Date     C ORAL SURGERY PROCEDURE  04/03/2017    Cyst removed from abscess in tooth     CAROTID ENDARTERECTOMY  2018     ENT SURGERY  04/01/2017    Oral surgery     ORTHOPEDIC SURGERY  12/2014    bone spur shaved from left big toe     TOE SURGERY      Left foot     Allergies   Allergen Reactions     Clindamycin      Social History     Socioeconomic History     Marital status:      Spouse name: Not on file     Number of children: Not on file     Years of education: Not on file     Highest education level: Not on file   Social Needs     Financial resource strain: Not on file     Food insecurity - worry: Not on file     Food insecurity - inability: Not on file     Transportation needs - medical: Not on file     Transportation needs - non-medical: Not on file   Occupational History     Not on file   Tobacco Use     Smoking status: Former Smoker      Packs/day: 0.50     Years: 30.00     Pack years: 15.00     Types: Cigarettes     Smokeless tobacco: Never Used   Substance and Sexual Activity     Alcohol use: No     Frequency: Never     Comment: occ     Drug use: No     Sexual activity: Yes     Partners: Male     Birth control/protection: Male Surgical     Comment:  Vasectomy   Other Topics Concern     Parent/sibling w/ CABG, MI or angioplasty before 65F 55M? No      Service No     Blood Transfusions No     Caffeine Concern No     Occupational Exposure No     Hobby Hazards No     Sleep Concern No     Stress Concern No     Weight Concern Yes     Special Diet No     Back Care No     Exercise Yes     Bike Helmet No     Seat Belt Yes     Self-Exams Yes   Social History Narrative     Not on file     Family History   Problem Relation Age of Onset     Cancer Mother         OVARIAN,  age 46,48     Other Cancer Mother         Ovarian     Respiratory Father      Cancer Father         Lung     Hypertension Father      Obesity Father      Diabetes Maternal Grandmother      Breast Cancer Maternal Grandmother      Diabetes Maternal Grandfather      Diabetes Paternal Grandmother      Diabetes Paternal Grandfather           REVIEW OF SYSTEMS:  General: negative, fever, chills, night sweats  Skin: negative, acne, rash and scaling  Eyes: negative, double vision, eye pain and photophobia  Ears/Nose/Throat: negative, nasal congestion and purulent rhinorrhea  Respiratory: No dyspnea on exertion, No cough, No hemoptysis and negative  Cardiovascular: negative, exertional chest pain or pressure, paroxysmal nocturnal dyspnea, dyspnea on exertion and orthopnea  Gastrointestinal: negative, dysphagia, nausea and vomiting  Genitourinary: negative, nocturia, dysuria and frequency  Musculoskeletal: negative, fracture, back pain and neck pain  Neurologic: negative, headaches and seizures  Psychiatric: negative, nervous breakdown, thoughts of self-harm and thoughts of hurting  someone else  Hematologic/Lymphatic/Immunologic: negative, bleeding disorder, chills and fever  Endocrine: negative, cold intolerance, heat intolerance and hot flashes       OBJECTIVE:  Blood pressure 95/68, pulse 80, weight 92.1 kg (203 lb), SpO2 96 %, not currently breastfeeding.  General Appearance: alert and no distress  Head: Normocephalic. No masses, lesions, tenderness or abnormalities  Eyes: conjuctiva clear, PERRL, EOM intact  Ears: External ears normal. Canals clear. TM's normal.  Nose: Nares normal  Mouth: normal  Neck: Supple, no cervical adenopathy, no thyromegaly  Lungs: clear to auscultation  Cardiac: regular rate and rhythm, normal S1 and S2, no murmur  Abdomen: Soft, nontender.  Normal bowel sounds.  No hepatosplenomegaly or abnormal masses  Extremities: no peripheral edema, peripheral pulses normal  Musculoskeletal: negative  Neurological: Cranial nerves 2-12 intact, motor strength intact       ASSESSMENT/PLAN:  Patient with atypical chest pain/palpitations.  S/P Rt .  Smoker. Quit recently. High LDL on statin now.  No excess alcohol,coffee.  EKG reviewed. NSR. Normal.  Will plan for an exercise stress echo to assess atypical chesyt pain.  Need a 2 weeks of Zio to evaluate palpitations.  Per orders.   Return to Clinic PRN.

## 2019-01-03 NOTE — PATIENT INSTRUCTIONS
Thank you for coming to the [facility] @ [place] [place]; please note the following instructions:    1. No medication change    2. Dr Amaro recommended to wear a heart monitor ( ZIO)  for 14 days    3. Per Dr Amaro you need to schedule an exercise stress ECHO see printed for date and instructions     4. Tests result will determine follow-up appointment. Clinic will call you to discuss      If you have any questions regarding your visit please contact your care team:     Cardiology  Telephone Number   Shira RICE, NERY AREVALO, NERY ACOSTA, JANIA DIAMOND MA   [phone]    *After hours: [phone]   For scheduling appts:     [phone] or    [phone] (select option 1)    *After hours: [phone]     For the Device Clinic (Pacemakers and ICD's)  RN's :  Kayla Martino   During business hours: [phone]    *After business hours:  [phone] (select option 4)      Normal test result notifications will be released via [facility] or mailed within 7 business days.  All other test results, will be communicated via telephone once reviewed by your cardiologist.    If you need a medication refill please contact your pharmacy.  Please allow 3 business days for your refill to be completed.    As always, thank you for trusting us with your health care needs!

## 2019-01-03 NOTE — NURSING NOTE
"Chief Complaint   Patient presents with     Palpitations     NEW for palpitations and shortness of breath with exertion. No cardiac work up other than an EKG has been preformed. EKG within normal limits. Pt reports sometimes chest pains. SOB, palpitations, lightheaded and dizziness.       Initial BP 95/68 (BP Location: Right arm, Patient Position: Chair, Cuff Size: Adult Large)   Pulse 80   Wt 92.1 kg (203 lb)   SpO2 96%   BMI 33.78 kg/m   Estimated body mass index is 33.78 kg/m  as calculated from the following:    Height as of 6/1/18: 1.651 m (5' 5\").    Weight as of this encounter: 92.1 kg (203 lb)..  BP completed using cuff size: mynor Cox MA    "

## 2019-01-03 NOTE — NURSING NOTE
Cardiac Monitors: Patient was instructed regarding the indication, function, care and prompt return of a ZIO monitor. Monitor will be placed after patient had Exercise stress ECHO.     Cardiac Testing: Patient given instructions regarding exercise stress ECHO  Discussed purpose, preparation, procedure and when to expect results reported back to the patient.     Med Reconcile: Reviewed and verified all current medications with the patient. The updated medication list was printed and given to the patient.    Return Appointment:Tests results will determine follow-up appointment.      Patient stated she understood all health information given and agreed to call with further questions or concerns.    Kalani Rhodes RN

## 2019-01-09 ENCOUNTER — TRANSFERRED RECORDS (OUTPATIENT)
Dept: HEALTH INFORMATION MANAGEMENT | Facility: CLINIC | Age: 54
End: 2019-01-09

## 2019-01-09 ENCOUNTER — ANCILLARY PROCEDURE (OUTPATIENT)
Dept: CARDIOLOGY | Facility: CLINIC | Age: 54
End: 2019-01-09
Payer: COMMERCIAL

## 2019-01-09 DIAGNOSIS — R00.2 PALPITATIONS: ICD-10-CM

## 2019-01-09 PROCEDURE — 93325 DOPPLER ECHO COLOR FLOW MAPG: CPT | Mod: TC | Performed by: INTERNAL MEDICINE

## 2019-01-09 PROCEDURE — 93017 CV STRESS TEST TRACING ONLY: CPT | Performed by: INTERNAL MEDICINE

## 2019-01-09 PROCEDURE — 93350 STRESS TTE ONLY: CPT | Mod: 26 | Performed by: INTERNAL MEDICINE

## 2019-01-09 PROCEDURE — 93352 ADMIN ECG CONTRAST AGENT: CPT | Performed by: INTERNAL MEDICINE

## 2019-01-09 PROCEDURE — 93016 CV STRESS TEST SUPVJ ONLY: CPT | Performed by: INTERNAL MEDICINE

## 2019-01-09 PROCEDURE — 93321 DOPPLER ECHO F-UP/LMTD STD: CPT | Mod: TC | Performed by: INTERNAL MEDICINE

## 2019-01-09 PROCEDURE — 0298T ZZC EXT ECG > 48HR TO 21 DAY REVIEW AND INTERPRETATN: CPT | Performed by: INTERNAL MEDICINE

## 2019-01-09 PROCEDURE — 40000264 ZZHC STATISTIC IV PUSH SINGLE INITIAL SUBSTANCE: Performed by: INTERNAL MEDICINE

## 2019-01-09 PROCEDURE — 93350 STRESS TTE ONLY: CPT | Mod: TC | Performed by: INTERNAL MEDICINE

## 2019-01-09 PROCEDURE — 93321 DOPPLER ECHO F-UP/LMTD STD: CPT | Mod: 26 | Performed by: INTERNAL MEDICINE

## 2019-01-09 PROCEDURE — 0296T ZIO PATCH HOLTER ADULT PEDIATRIC GREATER THAN 48 HRS: CPT | Performed by: INTERNAL MEDICINE

## 2019-01-09 PROCEDURE — 93018 CV STRESS TEST I&R ONLY: CPT | Performed by: INTERNAL MEDICINE

## 2019-01-09 PROCEDURE — 93325 DOPPLER ECHO COLOR FLOW MAPG: CPT | Mod: 26 | Performed by: INTERNAL MEDICINE

## 2019-01-09 RX ADMIN — Medication 3 ML: at 15:15

## 2019-01-09 NOTE — PROGRESS NOTES
2 week ZioXT applied to patient today. Instructions on use and removal given and mail back instructions discussed. All questions answered. Consent form signed. Device registered. Form faxed to [employer].  Device number: [id number].    Haven Cox MA

## 2019-01-10 DIAGNOSIS — E78.5 HYPERLIPIDEMIA LDL GOAL <130: ICD-10-CM

## 2019-01-10 DIAGNOSIS — K21.9 GASTROESOPHAGEAL REFLUX DISEASE WITHOUT ESOPHAGITIS: Primary | ICD-10-CM

## 2019-01-10 RX ORDER — ATORVASTATIN CALCIUM 40 MG/1
TABLET, FILM COATED ORAL
Qty: 90 TABLET | Refills: 3 | Status: SHIPPED | OUTPATIENT
Start: 2019-01-10 | End: 2019-12-26

## 2019-01-10 NOTE — TELEPHONE ENCOUNTER
Routing refill request to provider for review/approval because:  Medication is reported/historical  Denisa Sr BSN, RN

## 2019-01-11 RX ORDER — OMEPRAZOLE 40 MG/1
40 CAPSULE, DELAYED RELEASE ORAL DAILY
Qty: 30 CAPSULE | Refills: 5 | Status: SHIPPED | OUTPATIENT
Start: 2019-01-11 | End: 2019-06-29

## 2019-02-12 ENCOUNTER — OFFICE VISIT (OUTPATIENT)
Dept: OBGYN | Facility: CLINIC | Age: 54
End: 2019-02-12
Payer: COMMERCIAL

## 2019-02-12 VITALS
HEIGHT: 65 IN | SYSTOLIC BLOOD PRESSURE: 134 MMHG | DIASTOLIC BLOOD PRESSURE: 86 MMHG | OXYGEN SATURATION: 97 % | WEIGHT: 205 LBS | BODY MASS INDEX: 34.16 KG/M2 | TEMPERATURE: 97.9 F | HEART RATE: 86 BPM

## 2019-02-12 DIAGNOSIS — R10.2 PELVIC PAIN IN FEMALE: Primary | ICD-10-CM

## 2019-02-12 PROCEDURE — 99213 OFFICE O/P EST LOW 20 MIN: CPT | Performed by: NURSE PRACTITIONER

## 2019-02-12 ASSESSMENT — MIFFLIN-ST. JEOR: SCORE: 1540.75

## 2019-02-12 NOTE — PROGRESS NOTES
SUBJECTIVE:   Christina Guzman is a 53 year old female who presents to clinic today for the following health issues:    Vaginal Bleeding (Dysmenorrhea)      Onset: Last week    Description:  Duration of bleeding episodes: 5 days  Frequency between periods:  8 months  Describe bleeding/flow:   Clots: no   Cramping: mild, having today also    Intensity:  mild    Accompanying signs and symptoms: none    History (similar episodes/previous evaluation): None    Precipitating or alleviating factors: None    Therapies tried and outcome: None    Patient had a menses October 2017, then June 2018. Nothing until last Monday. Had thought that if she went over 6 months with no cycle, was to be seen if she had any bleeding. Denies hot flashes, night sweats, vaginal dryness, skin or hair changes. No vision changes or headaches.  For 1-2 weeks prior to menses, was having LLQ pain. It comes and goes, dull and throbbing. No sharp, shooting pains. No aggravating factors. Manageable without pain medication. Not all day every day. Does have a history of ovarian cysts. Family history of ovarian cancer.   Menses was normal flow, typical 5 days and without clots. Bleeding stopped at end of last week. Denies abnormal urinary or vaginal symptoms.    Problem list and histories reviewed & adjusted, as indicated.  Additional history: as documented    Patient Active Problem List   Diagnosis     Chronic idiopathic urticaria     House dust mite allergy     CARDIOVASCULAR SCREENING; LDL GOAL LESS THAN 160     Migraine headache     Low back pain     Menopausal depression     Menopausal syndrome     Vitamin D deficiency     GERD (gastroesophageal reflux disease)     Toe pain     Skin tag     Low back pain, unspecified back pain laterality, unspecified chronicity, with sciatica presence unspecified     H/O carotid endarterectomy     TIA (transient ischemic attack)     Past Surgical History:   Procedure Laterality Date     C ORAL SURGERY PROCEDURE   "2017    Cyst removed from abscess in tooth     CAROTID ENDARTERECTOMY  2018     ENT SURGERY  2017    Oral surgery     ORTHOPEDIC SURGERY  2014    bone spur shaved from left big toe     TOE SURGERY      Left foot       Social History     Tobacco Use     Smoking status: Former Smoker     Packs/day: 0.50     Years: 30.00     Pack years: 15.00     Types: Cigarettes     Smokeless tobacco: Never Used   Substance Use Topics     Alcohol use: No     Frequency: Never     Comment: occ     Family History   Problem Relation Age of Onset     Cancer Mother         OVARIAN,  age 46,48     Other Cancer Mother         Ovarian     Respiratory Father      Cancer Father         Lung     Hypertension Father      Obesity Father      Diabetes Maternal Grandmother      Breast Cancer Maternal Grandmother      Diabetes Maternal Grandfather      Diabetes Paternal Grandmother      Diabetes Paternal Grandfather            Reviewed and updated as needed this visit by clinical staff  Tobacco  Allergies  Meds       Reviewed and updated as needed this visit by Provider         ROS:  Constitutional, HEENT, cardiovascular, pulmonary, gi and gu systems are negative, except as otherwise noted.    OBJECTIVE:     /86   Pulse 86   Temp 97.9  F (36.6  C) (Oral)   Ht 1.651 m (5' 5\")   Wt 93 kg (205 lb)   SpO2 97%   BMI 34.11 kg/m    Body mass index is 34.11 kg/m .  GENERAL: healthy, alert and no distress  RESP: lungs clear to auscultation - no rales, rhonchi or wheezes  CV: regular rate and rhythm, normal S1 S2, no S3 or S4, no murmur, click or rub, no peripheral edema and peripheral pulses strong  ABDOMEN: soft, nontender, no hepatosplenomegaly, no masses and bowel sounds normal   (female): normal female external genitalia, normal urethral meatus, vaginal mucosa, normal cervix/uterus without masses or discharge.Mild tenderness in left adnexa. No tenderness on right.  MS: no gross musculoskeletal defects noted, no " edema  SKIN: no suspicious lesions or rashes  PSYCH: mentation appears normal, affect normal/bright    ASSESSMENT/PLAN:   1. Pelvic pain in female  We discussed her pelvic pain. Discussed that after 12 months of no menstrual cycles, would need to be seen if she had any additional bleeding. Also, would need to be seen if she has any heavy or prolonged menstrual cycles.   Discussed possible etiologies of her pain and how it may related to the recent menses. Due to her history, will obtain pelvic ultrasound for additional evaluation. Warning signs to monitor for and report immediately discussed with patient and she verbalizes understanding. Patient is given an opportunity to ask questions and have them answered.  - US Pelvic Complete with Transvaginal; Future    MARIANO Wilder CNP  [facility]

## 2019-05-15 ENCOUNTER — OFFICE VISIT (OUTPATIENT)
Dept: FAMILY MEDICINE | Facility: CLINIC | Age: 54
End: 2019-05-15
Payer: COMMERCIAL

## 2019-05-15 VITALS
DIASTOLIC BLOOD PRESSURE: 80 MMHG | OXYGEN SATURATION: 98 % | BODY MASS INDEX: 34.28 KG/M2 | SYSTOLIC BLOOD PRESSURE: 120 MMHG | TEMPERATURE: 98.5 F | WEIGHT: 206 LBS | RESPIRATION RATE: 16 BRPM | HEART RATE: 85 BPM

## 2019-05-15 DIAGNOSIS — L50.1 CHRONIC IDIOPATHIC URTICARIA: ICD-10-CM

## 2019-05-15 DIAGNOSIS — L50.9 HIVES: Primary | ICD-10-CM

## 2019-05-15 PROCEDURE — 99213 OFFICE O/P EST LOW 20 MIN: CPT | Performed by: FAMILY MEDICINE

## 2019-05-15 RX ORDER — TRIAMCINOLONE ACETONIDE 1 MG/G
CREAM TOPICAL 2 TIMES DAILY
Qty: 80 G | Refills: 0 | Status: SHIPPED | OUTPATIENT
Start: 2019-05-15 | End: 2023-11-08

## 2019-05-15 RX ORDER — CETIRIZINE HYDROCHLORIDE 10 MG/1
10 TABLET ORAL DAILY
Qty: 60 TABLET | Refills: 0 | Status: SHIPPED | OUTPATIENT
Start: 2019-05-15 | End: 2019-11-15

## 2019-05-15 RX ORDER — METHYLPREDNISOLONE 4 MG
4 TABLET, DOSE PACK ORAL SEE ADMIN INSTRUCTIONS
Qty: 21 TABLET | Refills: 0 | Status: SHIPPED | OUTPATIENT
Start: 2019-05-15 | End: 2019-11-15

## 2019-05-15 ASSESSMENT — PAIN SCALES - GENERAL: PAINLEVEL: NO PAIN (0)

## 2019-05-15 NOTE — PATIENT INSTRUCTIONS
Patient Education     Hives (Adult)  Hives are pink or red bumps on the skin. These bumps are also known as wheals. The bumps can itch, burn, or sting. Hives can occur anywhere on the body. They vary in size and shape and can form in clusters. Individual hives can appear and go away quickly. New hives may develop as old ones fade. Hives are common and usually harmless. Occasionally hives are a sign of a serious allergy.  Hives are often caused by an allergic reaction. It may be an allergic reaction to foods such as fruit, shellfish, chocolate, nuts, or tomatoes. It may be a reaction to pollens, animal fur, or mold spores. Medicines, chemicals, and insect bites can also cause hives. And hives can be caused by hot sun or cold air. The cause of hives can be difficult to find.  You may be given medicines to relieve swelling and itching. Follow all instructions when using these medicines. The hives will usually fade in a few days, but can last up to 2 weeks.  Home care  Follow these tips:    Try to find the cause of the hives and eliminate it. Discuss possible causes with your healthcare provider. Future reactions to the same allergen may be worse.    Don t scratch the hives. Scratching will delay healing. To reduce itching, apply cool, wet compresses to the skin.    Dress in soft, loose cotton clothing.    Don t bathe in hot water. This can make the itching worse.    Apply an ice pack or cool pack wrapped in a thin towel to your skin. This will help reduce redness and itching. But if your hives were caused by exposure to cold, then do not apply more cold to them.    You may use over-the counter antihistamines to reduce itching. Some older antihistamines, such as diphenhydramine and chlorpheniramine, are inexpensive. But they need to be taken often and may make you sleepy. They are best used at bedtime. Don t use diphenhydramine if you have glaucoma or have trouble urinating because of an enlarged prostate. Newer  antihistamines, such as loratadine, cetirizine, and fexofenadine, are generally more expensive. But they tend to have fewer side effects, such as drowsiness. They can be taken less often.    Another type of antihistamine is used to treat heartburn. This type includes ranitidine, nizatidine, famotidine, and cimetidine. These are sometimes used along with the above antihistamines if a single medicine is not working.  Follow-up care  Follow up with your healthcare provider if your symptoms don't get better in 2 days. Ask your provider about allergy testing if you have had a severe reaction, or have had several episodes of hives. He or she can use the allergy testing to find out what you are allergic to.  When to seek medical advice  Call your healthcare provider right away if any of these occur:    Fever of 100.4 F (38.0 C) or higher, or as directed by your healthcare provider    Redness, swelling, or pain    Foul-smelling fluid coming from the rash  Call 911  Call 911 if any of the following occur:    Swelling of the face, throat, or tongue    Trouble breathing or swallowing    Dizziness, weakness, or fainting  Date Last Reviewed: 9/1/2016 2000-2018 [employer]. [address] [place] [address], [place] [address]. All rights reserved. This information is not intended as a substitute for professional medical care. Always follow your healthcare professional's instructions.

## 2019-05-15 NOTE — NURSING NOTE
"Chief Complaint   Patient presents with     Derm Problem     itchy skin and hives since yesterday, took benadryl     Health Maintenance     HM Due: Preventive care/Zoster/Phq2       Initial /80   Pulse 85   Temp 98.5  F (36.9  C) (Oral)   Resp 16   Wt 93.4 kg (206 lb)   SpO2 98%   Breastfeeding? No   BMI 34.28 kg/m   Estimated body mass index is 34.28 kg/m  as calculated from the following:    Height as of 2/12/19: 1.651 m (5' 5\").    Weight as of this encounter: 93.4 kg (206 lb).  Medication Reconciliation: complete  Shahid Bojorquez MA    "

## 2019-06-29 DIAGNOSIS — K21.9 GASTROESOPHAGEAL REFLUX DISEASE WITHOUT ESOPHAGITIS: ICD-10-CM

## 2019-07-01 RX ORDER — OMEPRAZOLE 40 MG/1
CAPSULE, DELAYED RELEASE ORAL
Qty: 90 CAPSULE | Refills: 1 | Status: SHIPPED | OUTPATIENT
Start: 2019-07-01 | End: 2019-11-15

## 2019-09-29 ENCOUNTER — HEALTH MAINTENANCE LETTER (OUTPATIENT)
Age: 54
End: 2019-09-29

## 2019-10-01 PROBLEM — M54.50 LOW BACK PAIN: Status: ACTIVE | Noted: 2017-12-19

## 2019-10-09 ENCOUNTER — TELEPHONE (OUTPATIENT)
Dept: OBGYN | Facility: CLINIC | Age: 54
End: 2019-10-09

## 2019-10-09 NOTE — TELEPHONE ENCOUNTER
Please contact patient-has appointment 10/11/19 scheduled as physical visit type, and notes state prescription refills. I am happy to see her for her annual exam, however, looks like her medications are Lipitor and Flexeril. This would need to come from her PCP. If she wants to come in fasting for her appointment, we can draw the needed labs, but then would need follow up with FP as I do not manage these prescriptions. Thank you. Erika QUINTANA CNP

## 2019-10-09 NOTE — TELEPHONE ENCOUNTER
Called patient and patient was unwilling to discuss what meds she was referring to over the phone and stated she would discuss with Erika at her appointment.    Patti Willard, CMA  October 9, 2019

## 2019-11-15 ENCOUNTER — OFFICE VISIT (OUTPATIENT)
Dept: OBGYN | Facility: CLINIC | Age: 54
End: 2019-11-15
Payer: COMMERCIAL

## 2019-11-15 VITALS
HEIGHT: 64 IN | HEART RATE: 71 BPM | WEIGHT: 189 LBS | SYSTOLIC BLOOD PRESSURE: 117 MMHG | DIASTOLIC BLOOD PRESSURE: 75 MMHG | BODY MASS INDEX: 32.27 KG/M2

## 2019-11-15 DIAGNOSIS — M54.50 CHRONIC LEFT-SIDED LOW BACK PAIN, UNSPECIFIED WHETHER SCIATICA PRESENT: ICD-10-CM

## 2019-11-15 DIAGNOSIS — K21.9 GASTROESOPHAGEAL REFLUX DISEASE WITHOUT ESOPHAGITIS: ICD-10-CM

## 2019-11-15 DIAGNOSIS — G89.29 CHRONIC LEFT-SIDED LOW BACK PAIN, UNSPECIFIED WHETHER SCIATICA PRESENT: ICD-10-CM

## 2019-11-15 DIAGNOSIS — Z01.419 ENCOUNTER FOR GYNECOLOGICAL EXAMINATION WITHOUT ABNORMAL FINDING: Primary | ICD-10-CM

## 2019-11-15 DIAGNOSIS — E78.5 HYPERLIPIDEMIA LDL GOAL <130: ICD-10-CM

## 2019-11-15 DIAGNOSIS — Z12.31 VISIT FOR SCREENING MAMMOGRAM: ICD-10-CM

## 2019-11-15 LAB
ALBUMIN SERPL-MCNC: 4 G/DL (ref 3.4–5)
ALP SERPL-CCNC: 91 U/L (ref 40–150)
ALT SERPL W P-5'-P-CCNC: 23 U/L (ref 0–50)
ANION GAP SERPL CALCULATED.3IONS-SCNC: 6 MMOL/L (ref 3–14)
AST SERPL W P-5'-P-CCNC: 12 U/L (ref 0–45)
BILIRUB SERPL-MCNC: 1.8 MG/DL (ref 0.2–1.3)
BUN SERPL-MCNC: 13 MG/DL (ref 7–30)
CALCIUM SERPL-MCNC: 9.4 MG/DL (ref 8.5–10.1)
CHLORIDE SERPL-SCNC: 100 MMOL/L (ref 94–109)
CHOLEST SERPL-MCNC: 175 MG/DL
CO2 SERPL-SCNC: 31 MMOL/L (ref 20–32)
CREAT SERPL-MCNC: 0.72 MG/DL (ref 0.52–1.04)
GFR SERPL CREATININE-BSD FRML MDRD: >90 ML/MIN/{1.73_M2}
GLUCOSE SERPL-MCNC: 91 MG/DL (ref 70–99)
HDLC SERPL-MCNC: 37 MG/DL
LDLC SERPL CALC-MCNC: 111 MG/DL
NONHDLC SERPL-MCNC: 138 MG/DL
POTASSIUM SERPL-SCNC: 4.3 MMOL/L (ref 3.4–5.3)
PROT SERPL-MCNC: 7.8 G/DL (ref 6.8–8.8)
SODIUM SERPL-SCNC: 137 MMOL/L (ref 133–144)
TRIGL SERPL-MCNC: 134 MG/DL

## 2019-11-15 PROCEDURE — 77063 BREAST TOMOSYNTHESIS BI: CPT | Mod: TC

## 2019-11-15 PROCEDURE — 99396 PREV VISIT EST AGE 40-64: CPT | Performed by: NURSE PRACTITIONER

## 2019-11-15 PROCEDURE — 36415 COLL VENOUS BLD VENIPUNCTURE: CPT | Performed by: NURSE PRACTITIONER

## 2019-11-15 PROCEDURE — 77067 SCR MAMMO BI INCL CAD: CPT | Mod: TC

## 2019-11-15 PROCEDURE — 80061 LIPID PANEL: CPT | Performed by: NURSE PRACTITIONER

## 2019-11-15 PROCEDURE — 80053 COMPREHEN METABOLIC PANEL: CPT | Performed by: NURSE PRACTITIONER

## 2019-11-15 RX ORDER — CYCLOBENZAPRINE HCL 10 MG
5-10 TABLET ORAL 2 TIMES DAILY PRN
Qty: 90 TABLET | Refills: 0 | Status: SHIPPED | OUTPATIENT
Start: 2019-11-15 | End: 2022-02-11

## 2019-11-15 RX ORDER — OMEPRAZOLE 40 MG/1
40 CAPSULE, DELAYED RELEASE ORAL DAILY
Qty: 90 CAPSULE | Refills: 3 | Status: SHIPPED | OUTPATIENT
Start: 2019-11-15 | End: 2020-02-11

## 2019-11-15 ASSESSMENT — MIFFLIN-ST. JEOR: SCORE: 1443.33

## 2019-11-15 NOTE — PROGRESS NOTES
SUBJECTIVE:   CC: Christina Guzman is an 54 year old woman who presents for preventive health visit.     Healthy Habits:     Getting at least 3 servings of Calcium per day:  NO    Bi-annual eye exam:  Yes    Dental care twice a year:  Yes    Sleep apnea or symptoms of sleep apnea:  None    Diet:  Regular (no restrictions)    Frequency of exercise:  None    Taking medications regularly:  Yes    Medication side effects:  Muscle aches    PHQ-2 Total Score: 0    Additional concerns today:  Yes    Needs refills on medications.      Today's PHQ-2 Score:   PHQ-2 ( 1999 Pf[employer]) 11/15/2019   Q1: Little interest or pleasure in doing things 0   Q2: Feeling down, depressed or hopeless 0   PHQ-2 Score 0   Q1: Little interest or pleasure in doing things Not at all   Q2: Feeling down, depressed or hopeless Not at all   PHQ-2 Score 0       Abuse: Current or Past(Physical, Sexual or Emotional)- No  Do you feel safe in your environment? Yes        Social History     Tobacco Use     Smoking status: Former Smoker     Packs/day: 0.50     Years: 30.00     Pack years: 15.00     Types: Cigarettes     Smokeless tobacco: Never Used   Substance Use Topics     Alcohol use: No     Frequency: Never     Comment: occ     If you drink alcohol do you typically have >3 drinks per day or >7 drinks per week? No    Alcohol Use 11/15/2019   Prescreen: >3 drinks/day or >7 drinks/week? Not Applicable   Prescreen: >3 drinks/day or >7 drinks/week? -   No flowsheet data found.    Reviewed orders with patient.  Reviewed health maintenance and updated orders accordingly - Yes  Patient Active Problem List   Diagnosis     Chronic idiopathic urticaria     House dust mite allergy     CARDIOVASCULAR SCREENING; LDL GOAL LESS THAN 160     Migraine headache     Low back pain     Menopausal depression     Menopausal syndrome     Vitamin D deficiency     GERD (gastroesophageal reflux disease)     Toe pain     Skin tag     Low back pain, unspecified back pain  laterality, unspecified chronicity, with sciatica presence unspecified     H/O carotid endarterectomy     TIA (transient ischemic attack)     Past Surgical History:   Procedure Laterality Date     C ORAL SURGERY PROCEDURE  2017    Cyst removed from abscess in tooth     CAROTID ENDARTERECTOMY  2018     ENT SURGERY  2017    Oral surgery     ORTHOPEDIC SURGERY  2014    bone spur shaved from left big toe     TOE SURGERY      Left foot       Social History     Tobacco Use     Smoking status: Former Smoker     Packs/day: 0.50     Years: 30.00     Pack years: 15.00     Types: Cigarettes     Smokeless tobacco: Never Used   Substance Use Topics     Alcohol use: No     Frequency: Never     Comment: occ     Family History   Problem Relation Age of Onset     Cancer Mother         OVARIAN,  age 46,48     Other Cancer Mother         Ovarian     Respiratory Father      Cancer Father         Lung     Hypertension Father      Obesity Father      Diabetes Maternal Grandmother      Breast Cancer Maternal Grandmother      Diabetes Maternal Grandfather      Diabetes Paternal Grandmother      Diabetes Paternal Grandfather            Mammogram Screening: Patient over age 50, mutual decision to screen reflected in health maintenance.    Pertinent mammograms are reviewed under the imaging tab.  History of abnormal Pap smear: NO - age 30-65 PAP every 5 years with negative HPV co-testing recommended  PAP / HPV Latest Ref Rng & Units 2017 2013 9/10/2010   PAP - NIL NIL NIL   HPV 16 DNA NEG Negative - -   HPV 18 DNA NEG Negative - -   OTHER HR HPV NEG Negative - -     Reviewed and updated as needed this visit by clinical staff  Tobacco  Allergies  Meds         Reviewed and updated as needed this visit by Provider        Past Medical History:   Diagnosis Date     Chronic idiopathic urticaria x     IgE 638, Tryptase=11 (min. elevated)     Fructose disorder     Fructose Malabsorbtion     House dust mite allergy  "9/7/10 [facility]ST    DM only     Menopausal depression 12/11/2012     Migraines       Past Surgical History:   Procedure Laterality Date     C ORAL SURGERY PROCEDURE  04/03/2017    Cyst removed from abscess in tooth     CAROTID ENDARTERECTOMY  2018     ENT SURGERY  04/01/2017    Oral surgery     ORTHOPEDIC SURGERY  12/2014    bone spur shaved from left big toe     TOE SURGERY      Left foot       Review of Systems  CONSTITUTIONAL: NEGATIVE for fever, chills, change in weight  INTEGUMENTARY/SKIN: NEGATIVE for worrisome rashes, moles or lesions  EYES: NEGATIVE for vision changes or irritation  ENT: NEGATIVE for ear, mouth and throat problems  RESP: NEGATIVE for significant cough or SOB  BREAST: NEGATIVE for masses, tenderness or discharge  CV: NEGATIVE for chest pain, palpitations or peripheral edema  GI: NEGATIVE for nausea, abdominal pain, heartburn, or change in bowel habits  : NEGATIVE for unusual urinary or vaginal symptoms. No vaginal bleeding-since 2/2019.  MUSCULOSKELETAL: NEGATIVE for significant arthralgias or myalgia  NEURO: NEGATIVE for weakness, dizziness or paresthesias  PSYCHIATRIC: NEGATIVE for changes in mood or affect      OBJECTIVE:   /75 (BP Location: Right arm, Cuff Size: Adult Regular)   Pulse 71   Ht 1.619 m (5' 3.75\")   Wt 85.7 kg (189 lb)   BMI 32.70 kg/m    Physical Exam  GENERAL APPEARANCE: healthy, alert and no distress  EYES: Eyes grossly normal to inspection, PERRL and conjunctivae and sclerae normal  HENT: ear canals and TM's normal, nose and mouth without ulcers or lesions, oropharynx clear and oral mucous membranes moist  NECK: no adenopathy, no asymmetry, masses, or scars and thyroid normal to palpation  RESP: lungs clear to auscultation - no rales, rhonchi or wheezes  BREAST: normal without masses, tenderness or nipple discharge and no palpable axillary masses or adenopathy  CV: regular rate and rhythm, normal S1 S2, no S3 or S4, no murmur, click or rub, no peripheral edema and " "peripheral pulses strong  ABDOMEN: soft, nontender, no hepatosplenomegaly, no masses and bowel sounds normal   (female): normal female external genitalia, normal urethral meatus, vaginal mucosal atrophy noted, normal cervix, adnexae, and uterus without masses or abnormal discharge  MS: no musculoskeletal defects are noted and gait is age appropriate without ataxia  SKIN: no suspicious lesions or rashes  NEURO: Normal strength and tone, sensory exam grossly normal, mentation intact and speech normal  PSYCH: mentation appears normal and affect normal/bright    ASSESSMENT/PLAN:   1. Encounter for gynecological examination without abnormal finding  Pap smear, colonoscopy and mammogram up to date    2. Hyperlipidemia LDL goal <130  Check labs today, patient has a few months of medication remaining. Advised needs to follow up with primary care for management and medication refills.  - Lipid panel reflex to direct LDL Fasting  - Comprehensive metabolic panel (BMP + Alb, Alk Phos, ALT, AST, Total. Bili, TP)    3. Gastroesophageal reflux disease without esophagitis  - omeprazole (PRILOSEC) 40 MG DR capsule; Take 1 capsule (40 mg) by mouth daily  Dispense: 90 capsule; Refill: 3    4. Chronic left-sided low back pain, unspecified whether sciatica present  Will send 1 time refill to her mail order pharmacy, patient advised additional refills need to be with primary care provider.  - cyclobenzaprine (FLEXERIL) 10 MG tablet; Take 0.5-1 tablets (5-10 mg) by mouth 2 times daily as needed for muscle spasms  Dispense: 90 tablet; Refill: 0    COUNSELING:  Reviewed preventive health counseling, as reflected in patient instructions  Special attention given to:        Regular exercise       Healthy diet/nutrition       Colon cancer screening       (Debbie)menopause management    Estimated body mass index is 32.7 kg/m  as calculated from the following:    Height as of this encounter: 1.619 m (5' 3.75\").    Weight as of this encounter: " 85.7 kg (189 lb).    Weight management plan: Discussed healthy diet and exercise guidelines     reports that she has quit smoking. Her smoking use included cigarettes. She has a 15.00 pack-year smoking history. She has never used smokeless tobacco.      Counseling Resources:  ATP IV Guidelines  Pooled Cohorts Equation Calculator  Breast Cancer Risk Calculator  FRAX Risk Assessment  ICSI Preventive Guidelines  Dietary Guidelines for Americans, 2010  USDA's MyPlate  ASA Prophylaxis  Lung CA Screening    MARIANO Wilder CNP  [facility]

## 2019-12-26 DIAGNOSIS — E78.5 HYPERLIPIDEMIA LDL GOAL <130: ICD-10-CM

## 2019-12-26 RX ORDER — ATORVASTATIN CALCIUM 40 MG/1
TABLET, FILM COATED ORAL
Qty: 90 TABLET | Refills: 0 | Status: SHIPPED | OUTPATIENT
Start: 2019-12-26 | End: 2020-02-11

## 2020-02-05 NOTE — PROGRESS NOTES
Subjective     Christina Guzman is a 54 year old female who presents to clinic today for the following health issues:    HPI     Bump on th roof of the mouth per pt x 2-3 months now and not getting any better, per pt size is increasing. Sometimes when pt is eating, roof of mouth swell.  No pain. Feels firm.     Previous smoker.     Hyperlipidemia Follow-Up      Are you regularly taking any medication or supplement to lower your cholesterol?   Yes- Atorvastatin    Are you having muscle aches or other side effects that you think could be caused by your cholesterol lowering medication?  Yes- sometimes per pt unsure if it is due to med?      How many servings of fruits and vegetables do you eat daily?  0-1    On average, how many sweetened beverages do you drink each day (Examples: soda, juice, sweet tea, etc.  Do NOT count diet or artificially sweetened beverages)?   0      How many days per week do you miss taking your medication? 0  Lipids worsened some but not to the point where I would change anything. Admits to poor lifestyle and wants to work on that first.       Patient Active Problem List   Diagnosis     Chronic idiopathic urticaria     House dust mite allergy     CARDIOVASCULAR SCREENING; LDL GOAL LESS THAN 160     Migraine headache     Low back pain     Menopausal depression     Menopausal syndrome     Vitamin D deficiency     GERD (gastroesophageal reflux disease)     Toe pain     Skin tag     Low back pain, unspecified back pain laterality, unspecified chronicity, with sciatica presence unspecified     H/O carotid endarterectomy     TIA (transient ischemic attack)     Past Surgical History:   Procedure Laterality Date     C ORAL SURGERY PROCEDURE  04/03/2017    Cyst removed from abscess in tooth     CAROTID ENDARTERECTOMY  2018     ENT SURGERY  04/01/2017    Oral surgery     ORTHOPEDIC SURGERY  12/2014    bone spur shaved from left big toe     TOE SURGERY      Left foot       Social History     Tobacco  "Use     Smoking status: Former Smoker     Packs/day: 0.50     Years: 30.00     Pack years: 15.00     Types: Cigarettes     Smokeless tobacco: Never Used   Substance Use Topics     Alcohol use: No     Frequency: Never     Comment: occ     Family History   Problem Relation Age of Onset     Cancer Mother         OVARIAN,  age 46,48     Other Cancer Mother         Ovarian     Respiratory Father      Cancer Father         Lung     Hypertension Father      Obesity Father      Hyperlipidemia Father      Diabetes Maternal Grandmother      Breast Cancer Maternal Grandmother      Diabetes Maternal Grandfather      Diabetes Paternal Grandmother      Diabetes Paternal Grandfather      Coronary Artery Disease Paternal Grandfather          Current Outpatient Medications   Medication Sig Dispense Refill     atorvastatin (LIPITOR) 40 MG tablet TAKE 1 TABLET DAILY 90 tablet 3     cyclobenzaprine (FLEXERIL) 10 MG tablet Take 0.5-1 tablets (5-10 mg) by mouth 2 times daily as needed for muscle spasms 90 tablet 0     EPINEPHrine (EPIPEN/ADRENACLICK/OR ANY BX GENERIC EQUIV) 0.3 MG/0.3ML injection 2-pack Inject 0.3 mLs (0.3 mg) into the muscle once as needed for anaphylaxis 0.3 mL 2     omeprazole (PRILOSEC) 40 MG DR capsule Take 1 capsule (40 mg) by mouth daily 90 capsule 3     triamcinolone (KENALOG) 0.1 % external cream Apply topically 2 times daily 80 g 0     hyoscyamine (LEVSIN/SL) 0.125 MG sublingual tablet        Allergies   Allergen Reactions     Clindamycin        Reviewed and updated as needed this visit by Provider         Review of Systems   ROS COMP: Constitutional, HEENT, cardiovascular, pulmonary, GI, , musculoskeletal, neuro, skin, endocrine and psych systems are negative, except as otherwise noted.      Objective    /83   Pulse 79   Temp 98.3  F (36.8  C) (Oral)   Resp 16   Ht 1.613 m (5' 3.5\")   Wt 88.5 kg (195 lb)   LMP 2018 (Exact Date)   SpO2 99%   Breastfeeding No   BMI 34.00 kg/m    Body " mass index is 34 kg/m .  Physical Exam   GENERAL: alert, no distress and obese  HENT: {:hard palate midline there is a very firm (feels like bone) round skin colored lesion present, not tender  RESP: lungs clear to auscultation - no rales, rhonchi or wheezes  CV: regular rate and rhythm, normal S1 S2, no S3 or S4, no murmur, click or rub, no peripheral edema and peripheral pulses strong  MS: no gross musculoskeletal defects noted, no edema  NEURO: Normal strength and tone, mentation intact and speech normal  PSYCH: mentation appears normal, affect normal/bright    Diagnostic Test Results:  Labs reviewed in Epic        Assessment & Plan     1. Hyperlipidemia LDL goal <130  Recheck labs 6-12 months      - atorvastatin (LIPITOR) 40 MG tablet; TAKE 1 TABLET DAILY  Dispense: 90 tablet; Refill: 3  - Lipid panel reflex to direct LDL Fasting; Future    2. Gastroesophageal reflux disease without esophagitis   stable    - omeprazole (PRILOSEC) 40 MG DR capsule; Take 1 capsule (40 mg) by mouth daily  Dispense: 90 capsule; Refill: 3  - Comprehensive metabolic panel; Future    3. Mouth sore  Unknown origin  Will refer she was a smoker  - OTOLARYNGOLOGY REFERRAL     Billin min spent face-to-face with patient. 10 min on history, 10 on exam, 5 on discussing diagnosis and treatment plan.     Patient Instructions   Lifestyle recommendations:  Being overweight or obese puts you are risk of major health problems including but not limited to: heart disease/heart attack, stroke, high cholesterol, high blood pressure, and diabetes.  This is why it is important to be at a healthy weight for your height.     Exercise 30 minutes 3-5 times a week, if you can only do 10 minutes 3 times a week that is still shown to have great benefit!  Brisk walking even counts for this.  Consider free youtube videos for exercise that fits your needs and lifestyle.     Monitor your caffeine and soda intake, try to minimize these beverages    Drink plenty  of water (about 70-80 ounces a day)    Try to eat a vegetable and fruit  with lunch and dinner.  Have a breakfast that contains protein such as eggs or oatmeal.  Decrease your white bread, pasta, and sweets intake.  Increase lean proteins like chicken or pork. Try to eat out 1-2 times a week or less.  Monitor your portion sizes, try using smaller plates if needed.  Eat slowly, this gives you time to be aware that your body is full.   Let me know at any time if you would like a referral to a nutritionist!            Patti Liang PA-C  [facility]

## 2020-02-11 ENCOUNTER — OFFICE VISIT (OUTPATIENT)
Dept: FAMILY MEDICINE | Facility: CLINIC | Age: 55
End: 2020-02-11
Payer: COMMERCIAL

## 2020-02-11 VITALS
HEART RATE: 79 BPM | OXYGEN SATURATION: 99 % | RESPIRATION RATE: 16 BRPM | TEMPERATURE: 98.3 F | HEIGHT: 64 IN | WEIGHT: 195 LBS | BODY MASS INDEX: 33.29 KG/M2 | SYSTOLIC BLOOD PRESSURE: 116 MMHG | DIASTOLIC BLOOD PRESSURE: 83 MMHG

## 2020-02-11 DIAGNOSIS — E78.5 HYPERLIPIDEMIA LDL GOAL <130: Primary | ICD-10-CM

## 2020-02-11 DIAGNOSIS — K21.9 GASTROESOPHAGEAL REFLUX DISEASE WITHOUT ESOPHAGITIS: ICD-10-CM

## 2020-02-11 DIAGNOSIS — K13.79 MOUTH SORE: ICD-10-CM

## 2020-02-11 PROCEDURE — 99214 OFFICE O/P EST MOD 30 MIN: CPT | Performed by: PHYSICIAN ASSISTANT

## 2020-02-11 RX ORDER — ATORVASTATIN CALCIUM 40 MG/1
TABLET, FILM COATED ORAL
Qty: 90 TABLET | Refills: 3 | Status: SHIPPED | OUTPATIENT
Start: 2020-02-11 | End: 2021-04-19

## 2020-02-11 RX ORDER — OMEPRAZOLE 40 MG/1
40 CAPSULE, DELAYED RELEASE ORAL DAILY
Qty: 90 CAPSULE | Refills: 3 | Status: SHIPPED | OUTPATIENT
Start: 2020-02-11 | End: 2021-04-19

## 2020-02-11 ASSESSMENT — MIFFLIN-ST. JEOR: SCORE: 1466.57

## 2020-02-11 NOTE — PATIENT INSTRUCTIONS
Lifestyle recommendations:  Being overweight or obese puts you are risk of major health problems including but not limited to: heart disease/heart attack, stroke, high cholesterol, high blood pressure, and diabetes.  This is why it is important to be at a healthy weight for your height.     Exercise 30 minutes 3-5 times a week, if you can only do 10 minutes 3 times a week that is still shown to have great benefit!  Brisk walking even counts for this.  Consider free youtube videos for exercise that fits your needs and lifestyle.     Monitor your caffeine and soda intake, try to minimize these beverages    Drink plenty of water (about 70-80 ounces a day)    Try to eat a vegetable and fruit  with lunch and dinner.  Have a breakfast that contains protein such as eggs or oatmeal.  Decrease your white bread, pasta, and sweets intake.  Increase lean proteins like chicken or pork. Try to eat out 1-2 times a week or less.  Monitor your portion sizes, try using smaller plates if needed.  Eat slowly, this gives you time to be aware that your body is full.   Let me know at any time if you would like a referral to a nutritionist!

## 2020-02-21 ENCOUNTER — OFFICE VISIT (OUTPATIENT)
Dept: OTOLARYNGOLOGY | Facility: CLINIC | Age: 55
End: 2020-02-21
Payer: COMMERCIAL

## 2020-02-21 VITALS
RESPIRATION RATE: 16 BRPM | SYSTOLIC BLOOD PRESSURE: 127 MMHG | HEART RATE: 106 BPM | DIASTOLIC BLOOD PRESSURE: 84 MMHG | OXYGEN SATURATION: 97 %

## 2020-02-21 DIAGNOSIS — M27.0 TORUS PALATINUS: Primary | ICD-10-CM

## 2020-02-21 PROCEDURE — 99203 OFFICE O/P NEW LOW 30 MIN: CPT | Performed by: OTOLARYNGOLOGY

## 2020-02-21 ASSESSMENT — PAIN SCALES - GENERAL: PAINLEVEL: NO PAIN (1)

## 2020-02-21 NOTE — LETTER
2/21/2020         RE: Christina Guzman  [id number] [facility] [id number]        Dear Colleague,    Thank you for referring your patient, Christina Guzman, to the [facility]. Please see a copy of my visit note below.    I am seeing this patient in consultation for mouth sore at the request of the provider Patti Liang.    Chief Complaint - oral lesion    History of Present Illness - Christina Guzman is a 54 year old female presents with a lesion on the roof of mouth. The patient has noticed for approximately 3 months. She also feels she has been getting other little bumps in her cheek and roof of mouth. It has been changing in size, getting. It isn't painful. No citrus or spicy intolerance. No bleeding. Former smoker, quit 2018. Smoker 1/2 ppd. No lumps or swollen glands in the neck.     Past Medical History -   Patient Active Problem List   Diagnosis     Chronic idiopathic urticaria     House dust mite allergy     CARDIOVASCULAR SCREENING; LDL GOAL LESS THAN 160     Migraine headache     Low back pain     Menopausal depression     Menopausal syndrome     Vitamin D deficiency     GERD (gastroesophageal reflux disease)     Toe pain     Skin tag     Low back pain, unspecified back pain laterality, unspecified chronicity, with sciatica presence unspecified     H/O carotid endarterectomy     TIA (transient ischemic attack)       Current Medications -   Current Outpatient Medications:      atorvastatin (LIPITOR) 40 MG tablet, TAKE 1 TABLET DAILY, Disp: 90 tablet, Rfl: 3     cyclobenzaprine (FLEXERIL) 10 MG tablet, Take 0.5-1 tablets (5-10 mg) by mouth 2 times daily as needed for muscle spasms, Disp: 90 tablet, Rfl: 0     EPINEPHrine (EPIPEN/ADRENACLICK/OR ANY BX GENERIC EQUIV) 0.3 MG/0.3ML injection 2-pack, Inject 0.3 mLs (0.3 mg) into the muscle once as needed for anaphylaxis, Disp: 0.3 mL, Rfl: 2     hyoscyamine (LEVSIN/SL) 0.125 MG sublingual tablet, , Disp: , Rfl:      omeprazole  (PRILOSEC) 40 MG DR capsule, Take 1 capsule (40 mg) by mouth daily, Disp: 90 capsule, Rfl: 3     triamcinolone (KENALOG) 0.1 % external cream, Apply topically 2 times daily, Disp: 80 g, Rfl: 0    Allergies -   Allergies   Allergen Reactions     Clindamycin        Social History -   Social History     Socioeconomic History     Marital status:      Spouse name: Not on file     Number of children: Not on file     Years of education: Not on file     Highest education level: Not on file   Occupational History     Not on file   Social Needs     Financial resource strain: Not on file     Food insecurity:     Worry: Not on file     Inability: Not on file     Transportation needs:     Medical: Not on file     Non-medical: Not on file   Tobacco Use     Smoking status: Former Smoker     Packs/day: 0.50     Years: 30.00     Pack years: 15.00     Types: Cigarettes     Smokeless tobacco: Never Used   Substance and Sexual Activity     Alcohol use: No     Frequency: Never     Comment: occ     Drug use: No     Sexual activity: Yes     Partners: Male     Birth control/protection: Male Surgical     Comment:  Vasectomy   Lifestyle     Physical activity:     Days per week: Not on file     Minutes per session: Not on file     Stress: Not on file   Relationships     Social connections:     Talks on phone: Not on file     Gets together: Not on file     Attends Judaism service: Not on file     Active member of club or organization: Not on file     Attends meetings of clubs or organizations: Not on file     Relationship status: Not on file     Intimate partner violence:     Fear of current or ex partner: Not on file     Emotionally abused: Not on file     Physically abused: Not on file     Forced sexual activity: Not on file   Other Topics Concern     Parent/sibling w/ CABG, MI or angioplasty before 65F 55M? No      Service No     Blood Transfusions No     Caffeine Concern No     Occupational Exposure No     Hobby  Hazards No     Sleep Concern No     Stress Concern No     Weight Concern Yes     Special Diet No     Back Care No     Exercise Yes     Bike Helmet No     Seat Belt Yes     Self-Exams Yes   Social History Narrative     Not on file       Family History -   Family History   Problem Relation Age of Onset     Cancer Mother         OVARIAN,  age 46,48     Other Cancer Mother         Ovarian     Respiratory Father      Cancer Father         Lung     Hypertension Father      Obesity Father      Hyperlipidemia Father      Diabetes Maternal Grandmother      Breast Cancer Maternal Grandmother      Diabetes Maternal Grandfather      Diabetes Paternal Grandmother      Diabetes Paternal Grandfather      Coronary Artery Disease Paternal Grandfather        Review of Systems - As per HPI and PMHx, otherwise 7 system review of the head and neck negative.    Physical Exam  /84   Pulse 106   Resp 16   LMP 2018 (Exact Date)   SpO2 97%   General - The patient is in no distress. Alert and oriented x3, answers questions and cooperates with examination appropriately.   Voice and Breathing - The patient was breathing comfortably without the use of accessory muscles. There was no wheezing, stridor, or stertor.  The patients voice was clear and strong.  Eyes - Extraocular movements intact. Sclera were not icteric or injected, conjunctiva were pink and moist.  Neurologic - Cranial nerves II-XII are grossly intact. Specifically, the facial nerve is intact, House-Brackmann grade 1 of 6.   Mouth - Examination of the oral cavity showed a small torus palatini, benign. No ulceration.she also had a small inflamed minor salivary gland on the left buccal mucosa. The tongue was mobile and protrudes midline.   Oropharynx - The walls of the oropharynx were smooth, symmetric, and had no lesions or ulcerations. No masses. The uvula was midline and the palate raised symmetrically.   Neck -  Soft. Non-tender. Palpation of the occipital,  submental, submandibular, internal jugular chain, and supraclavicular nodes did not demonstrate any abnormal lymph nodes or masses. The parotid glands were without masses. Palpation of the thyroid was soft and smooth, with no nodules or goiter appreciated.  The trachea was midline.      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  I explained that there is nothing to worry about.  Sometimes these grow slowly.  There is a fast growth, pain, bleeding, ulceration etc. she should return to see me.  She also has a very small implant minor salivary gland or buccal mucosa.  I also reassured her that this is okay.  If it begins to enlarge she may have to return for removal.  She may want to try changing her toothpaste and maintain good hydration      Dorian Cordero MD  Otolaryngology  [facility]      Again, thank you for allowing me to participate in the care of your patient.        Sincerely,        Dorian Cordero MD

## 2020-02-21 NOTE — PROGRESS NOTES
I am seeing this patient in consultation for mouth sore at the request of the provider Patti Liang.    Chief Complaint - oral lesion    History of Present Illness - Christina Guzman is a 54 year old female presents with a lesion on the roof of mouth. The patient has noticed for approximately 3 months. She also feels she has been getting other little bumps in her cheek and roof of mouth. It has been changing in size, getting. It isn't painful. No citrus or spicy intolerance. No bleeding. Former smoker, quit 2018. Smoker 1/2 ppd. No lumps or swollen glands in the neck.     Past Medical History -   Patient Active Problem List   Diagnosis     Chronic idiopathic urticaria     House dust mite allergy     CARDIOVASCULAR SCREENING; LDL GOAL LESS THAN 160     Migraine headache     Low back pain     Menopausal depression     Menopausal syndrome     Vitamin D deficiency     GERD (gastroesophageal reflux disease)     Toe pain     Skin tag     Low back pain, unspecified back pain laterality, unspecified chronicity, with sciatica presence unspecified     H/O carotid endarterectomy     TIA (transient ischemic attack)       Current Medications -   Current Outpatient Medications:      atorvastatin (LIPITOR) 40 MG tablet, TAKE 1 TABLET DAILY, Disp: 90 tablet, Rfl: 3     cyclobenzaprine (FLEXERIL) 10 MG tablet, Take 0.5-1 tablets (5-10 mg) by mouth 2 times daily as needed for muscle spasms, Disp: 90 tablet, Rfl: 0     EPINEPHrine (EPIPEN/ADRENACLICK/OR ANY BX GENERIC EQUIV) 0.3 MG/0.3ML injection 2-pack, Inject 0.3 mLs (0.3 mg) into the muscle once as needed for anaphylaxis, Disp: 0.3 mL, Rfl: 2     hyoscyamine (LEVSIN/SL) 0.125 MG sublingual tablet, , Disp: , Rfl:      omeprazole (PRILOSEC) 40 MG DR capsule, Take 1 capsule (40 mg) by mouth daily, Disp: 90 capsule, Rfl: 3     triamcinolone (KENALOG) 0.1 % external cream, Apply topically 2 times daily, Disp: 80 g, Rfl: 0    Allergies -   Allergies   Allergen Reactions      Clindamycin        Social History -   Social History     Socioeconomic History     Marital status:      Spouse name: Not on file     Number of children: Not on file     Years of education: Not on file     Highest education level: Not on file   Occupational History     Not on file   Social Needs     Financial resource strain: Not on file     Food insecurity:     Worry: Not on file     Inability: Not on file     Transportation needs:     Medical: Not on file     Non-medical: Not on file   Tobacco Use     Smoking status: Former Smoker     Packs/day: 0.50     Years: 30.00     Pack years: 15.00     Types: Cigarettes     Smokeless tobacco: Never Used   Substance and Sexual Activity     Alcohol use: No     Frequency: Never     Comment: occ     Drug use: No     Sexual activity: Yes     Partners: Male     Birth control/protection: Male Surgical     Comment:  Vasectomy   Lifestyle     Physical activity:     Days per week: Not on file     Minutes per session: Not on file     Stress: Not on file   Relationships     Social connections:     Talks on phone: Not on file     Gets together: Not on file     Attends Gnosticism service: Not on file     Active member of club or organization: Not on file     Attends meetings of clubs or organizations: Not on file     Relationship status: Not on file     Intimate partner violence:     Fear of current or ex partner: Not on file     Emotionally abused: Not on file     Physically abused: Not on file     Forced sexual activity: Not on file   Other Topics Concern     Parent/sibling w/ CABG, MI or angioplasty before 65F 55M? No      Service No     Blood Transfusions No     Caffeine Concern No     Occupational Exposure No     Hobby Hazards No     Sleep Concern No     Stress Concern No     Weight Concern Yes     Special Diet No     Back Care No     Exercise Yes     Bike Helmet No     Seat Belt Yes     Self-Exams Yes   Social History Narrative     Not on file       Family  History -   Family History   Problem Relation Age of Onset     Cancer Mother         OVARIAN,  age 46,48     Other Cancer Mother         Ovarian     Respiratory Father      Cancer Father         Lung     Hypertension Father      Obesity Father      Hyperlipidemia Father      Diabetes Maternal Grandmother      Breast Cancer Maternal Grandmother      Diabetes Maternal Grandfather      Diabetes Paternal Grandmother      Diabetes Paternal Grandfather      Coronary Artery Disease Paternal Grandfather        Review of Systems - As per HPI and PMHx, otherwise 7 system review of the head and neck negative.    Physical Exam  /84   Pulse 106   Resp 16   LMP 2018 (Exact Date)   SpO2 97%   General - The patient is in no distress. Alert and oriented x3, answers questions and cooperates with examination appropriately.   Voice and Breathing - The patient was breathing comfortably without the use of accessory muscles. There was no wheezing, stridor, or stertor.  The patients voice was clear and strong.  Eyes - Extraocular movements intact. Sclera were not icteric or injected, conjunctiva were pink and moist.  Neurologic - Cranial nerves II-XII are grossly intact. Specifically, the facial nerve is intact, House-Brackmann grade 1 of 6.   Mouth - Examination of the oral cavity showed a small torus palatini, benign. No ulceration.she also had a small inflamed minor salivary gland on the left buccal mucosa. The tongue was mobile and protrudes midline.   Oropharynx - The walls of the oropharynx were smooth, symmetric, and had no lesions or ulcerations. No masses. The uvula was midline and the palate raised symmetrically.   Neck -  Soft. Non-tender. Palpation of the occipital, submental, submandibular, internal jugular chain, and supraclavicular nodes did not demonstrate any abnormal lymph nodes or masses. The parotid glands were without masses. Palpation of the thyroid was soft and smooth, with no nodules or goiter  appreciated.  The trachea was midline.      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  I explained that there is nothing to worry about.  Sometimes these grow slowly.  There is a fast growth, pain, bleeding, ulceration etc. she should return to see me.  She also has a very small implant minor salivary gland or buccal mucosa.  I also reassured her that this is okay.  If it begins to enlarge she may have to return for removal.  She may want to try changing her toothpaste and maintain good hydration      Dorian Cordero MD  Otolaryngology  [facility]

## 2020-02-21 NOTE — PATIENT INSTRUCTIONS
General Scheduling Information  To schedule your CT/MRI scan, please contact [facility] at [phone]   [address] [place] [address]    To schedule your Surgery, please contact our Specialty Schedulers at [phone]    ENT Clinic Locations Clinic Hours Telephone Number     [facility]  [address] [place] [address]   Tuesday:       8:00am -- 4:00pm    Wednesday:  8:00am - 4:00pm   To schedule an appointment with   Dr. Cordero,   please contact our   Specialty Scheduling Department at:     [phone]       [facility]  [address] [place] [address]   Friday:          8:00am - 4:00pm         Urgent Care Locations Clinic Hours Telephone Numbers     [facility]  [address] [place] [address]     Monday-Friday:     11:00pm - 9:00pm    Saturday-Sunday:  9:00am - 5:00pm   [phone]     [facility]  [address] [place] [address]     Monday-Friday:      5:00pm - 9:00pm     Saturday-Sunday:  9:00am - 5:00pm   [phone]

## 2020-06-07 NOTE — TELEPHONE ENCOUNTER
Christina Guzman is a 53 year old female who calls with heart flutter.    NURSING ASSESSMENT:  Description:  Pt made appointment with Patti RAUSCH on 12-31-18 for evaluation of dizziness and heart fluttering.  Patti RAUSCH requests nurse triage pt.  Pt states for one month pt has had multiple times daily episodes of heart racing, fluttering with dizzy feeling.  Pt is not currently having sx.  They mostly happen she she is laying down but also occurred when she was melanie shopping.  Pt describes it as fluttery and skipping beats.  She has felt faint with it.  The episodes last from 15 sec to one min.    Allergies:   Allergies   Allergen Reactions     Clindamycin          NURSING PLAN: Nursing advice to patient advised pt to come to urgent care tonight at 5 pm for evaluation. advised to keep scheduled appt as follow up if needed.  advised if any new or worsening sx such as lenghening of episodes, sob, or chest pain go to ER.    RECOMMENDED DISPOSITION:  See in 24 hours -   Will comply with recommendation: Yes  If further questions/concerns or if symptoms do not improve, worsen or new symptoms develop, call your PCP or [facility] as soon as possible.      Guideline used:  Telephone Triage Protocols for Nurses, Fifth Edition, Kayla Hansen pp. 317.    Denisa rS RN    
Per Patti please call pt regarding SX of heart palpitation and dizziness to see if it is emergent. Doris George MA  
,DirectAddress_Unknown,DirectAddress_Unknown

## 2020-10-05 ENCOUNTER — OFFICE VISIT (OUTPATIENT)
Dept: FAMILY MEDICINE | Facility: CLINIC | Age: 55
End: 2020-10-05
Payer: COMMERCIAL

## 2020-10-05 VITALS
WEIGHT: 204 LBS | BODY MASS INDEX: 35.57 KG/M2 | RESPIRATION RATE: 16 BRPM | DIASTOLIC BLOOD PRESSURE: 64 MMHG | HEART RATE: 73 BPM | SYSTOLIC BLOOD PRESSURE: 126 MMHG | TEMPERATURE: 97.5 F | OXYGEN SATURATION: 98 %

## 2020-10-05 DIAGNOSIS — E66.01 MORBID OBESITY (H): ICD-10-CM

## 2020-10-05 DIAGNOSIS — N95.2 VAGINAL ATROPHY: ICD-10-CM

## 2020-10-05 DIAGNOSIS — N89.8 VAGINA ITCHING: ICD-10-CM

## 2020-10-05 DIAGNOSIS — Z12.4 SCREENING FOR MALIGNANT NEOPLASM OF CERVIX: Primary | ICD-10-CM

## 2020-10-05 LAB
SPECIMEN SOURCE: NORMAL
WET PREP SPEC: NORMAL

## 2020-10-05 PROCEDURE — 87210 SMEAR WET MOUNT SALINE/INK: CPT | Performed by: PHYSICIAN ASSISTANT

## 2020-10-05 PROCEDURE — 99214 OFFICE O/P EST MOD 30 MIN: CPT | Performed by: PHYSICIAN ASSISTANT

## 2020-10-05 PROCEDURE — 87624 HPV HI-RISK TYP POOLED RSLT: CPT | Performed by: PHYSICIAN ASSISTANT

## 2020-10-05 PROCEDURE — G0145 SCR C/V CYTO,THINLAYER,RESCR: HCPCS | Performed by: PHYSICIAN ASSISTANT

## 2020-10-05 RX ORDER — ESTRADIOL 0.1 MG/G
2 CREAM VAGINAL
Qty: 42.5 G | Refills: 5 | Status: SHIPPED | OUTPATIENT
Start: 2020-10-05 | End: 2021-01-06

## 2020-10-05 NOTE — PROGRESS NOTES
Subjective     Christina Guzman is a 55 year old female who presents to clinic today for the following health issues:    HPI     Vaginal Symptoms  Onset/Duration: per pt for a few months now  Description:  Vaginal Discharge: none   Itching (Pruritis): YES  Burning sensation:  YES- burns because tissue of the skin will be sensitive  Odor: no  Accompanying Signs & Symptoms:  Urinary symptoms: no  Abdominal pain: no  Fever: no  History:   Sexually active: YES  New Partner: no  Possibility of Pregnancy:  no  Recent antibiotic use: no  Previous vaginitis issues: no    Therapies tried and outcome: Vagisil, Vagistat, cranberry juice and OTC methods    Weeks of itchy vaginal area.   Sometimes feels dry. No pain with sex.   Declines need for std screening. No vaginal discharge.         Still has uterus and cervix.   Grandparent with breast cancer. Mom with ovarian cancer.     Morbid obesity-due to bmi and co-morbid due gerd. Takes prilosec.         Review of Systems   Constitutional, HEENT, cardiovascular, pulmonary, GI, , musculoskeletal, neuro, skin, endocrine and psych systems are negative, except as otherwise noted.       Objective    /64   Pulse 73   Temp 97.5  F (36.4  C) (Tympanic)   Resp 16   Wt 92.5 kg (204 lb)   LMP 05/25/2018 (Exact Date)   SpO2 98%   Breastfeeding No   BMI 35.57 kg/m    Body mass index is 35.57 kg/m .  Physical Exam   GENERAL: alert, no distress and obese  RESP: lungs clear to auscultation - no rales, rhonchi or wheezes  CV: regular rate and rhythm, normal S1 S2, no S3 or S4, no murmur, click or rub, no peripheral edema and peripheral pulses strong   (female): normal female external genitalia, normal urethral meatus , vaginal mucosal atrophy and normal cervix, adnexae, and uterus without masses. No skin rash seen.   MS: no gross musculoskeletal defects noted, no edema  NEURO: Normal strength and tone, mentation intact and speech normal  PSYCH: mentation appears normal, affect  "normal/bright      Results for orders placed or performed in visit on 10/05/20   Wet prep     Status: None    Specimen: Vagina   Result Value Ref Range    Specimen Description Vagina     Wet Prep No Trichomonas seen     Wet Prep No clue cells seen     Wet Prep No yeast seen     Wet Prep Many  WBC'S seen                Assessment & Plan     Screening for malignant neoplasm of cervix    - Pap imaged thin layer screen with HPV - recommended age 30 - 65 years (select HPV order below)  - HPV High Risk Types DNA Cervical    Morbid obesity (H)  See below    Vagina itching  Suspect from vaginal atrophy  - Wet prep    Vaginal atrophy  DISCUSSED ALL RISKS INCLUDING ENDOMETRIAL AND BREAST CANCER. Minimal but there is some systemic absorption. Discussed trying KY jelly first, patient is very bothered by symptoms and wants to try topical estrogen first    - estradiol (ESTRACE) 0.1 MG/GM vaginal cream; Place 2 g vaginally twice a week     BMI:   Estimated body mass index is 35.57 kg/m  as calculated from the following:    Height as of 2/11/20: 1.613 m (5' 3.5\").    Weight as of this encounter: 92.5 kg (204 lb).   Weight management plan: Discussed healthy diet and exercise guidelines    Lifestyle recommendations:  Being overweight or obese puts you are risk of major health problems including but not limited to: heart disease/heart attack, stroke, high cholesterol, high blood pressure, and diabetes.  This is why it is important to be at a healthy weight for your height.     Exercise 30 minutes 3-5 times a week, if you can only do 10 minutes 3 times a week that is still shown to have great benefit!  Brisk walking even counts for this.  Consider free youtube videos for exercise that fits your needs and lifestyle.     Monitor your caffeine and soda intake, try to minimize these beverages    Drink plenty of water (about 70-80 ounces a day)    Try to eat a vegetable and fruit  with lunch and dinner.  Have a breakfast that contains " protein such as eggs or oatmeal.  Decrease your white bread, pasta, and sweets intake.  Increase lean proteins like chicken or pork. Try to eat out 1-2 times a week or less.  Monitor your portion sizes, try using smaller plates if needed.  Eat slowly, this gives you time to be aware that your body is full.   Let me know at any time if you would like a referral to a nutritionist!               Return in about 4 weeks (around 11/2/2020) for if not improving WITH OBGYN.    Patti Liang PA-C  [facility]

## 2020-10-08 LAB
COPATH REPORT: NORMAL
PAP: NORMAL

## 2020-10-12 LAB
FINAL DIAGNOSIS: NORMAL
HPV HR 12 DNA CVX QL NAA+PROBE: NEGATIVE
HPV16 DNA SPEC QL NAA+PROBE: NEGATIVE
HPV18 DNA SPEC QL NAA+PROBE: NEGATIVE
SPECIMEN DESCRIPTION: NORMAL
SPECIMEN SOURCE CVX/VAG CYTO: NORMAL

## 2020-10-25 ENCOUNTER — VIRTUAL VISIT (OUTPATIENT)
Dept: FAMILY MEDICINE | Facility: OTHER | Age: 55
End: 2020-10-25
Payer: COMMERCIAL

## 2020-10-25 DIAGNOSIS — Z20.822 SUSPECTED 2019 NOVEL CORONAVIRUS INFECTION: ICD-10-CM

## 2020-10-25 DIAGNOSIS — Z20.822 SUSPECTED 2019 NOVEL CORONAVIRUS INFECTION: Primary | ICD-10-CM

## 2020-10-25 PROCEDURE — 99421 OL DIG E/M SVC 5-10 MIN: CPT | Performed by: PHYSICIAN ASSISTANT

## 2020-10-25 PROCEDURE — U0003 INFECTIOUS AGENT DETECTION BY NUCLEIC ACID (DNA OR RNA); SEVERE ACUTE RESPIRATORY SYNDROME CORONAVIRUS 2 (SARS-COV-2) (CORONAVIRUS DISEASE [COVID-19]), AMPLIFIED PROBE TECHNIQUE, MAKING USE OF HIGH THROUGHPUT TECHNOLOGIES AS DESCRIBED BY CMS-2020-01-R: HCPCS | Performed by: FAMILY MEDICINE

## 2020-10-25 NOTE — PROGRESS NOTES
"Date: 10/25/2020 11:25:48  Clinician: Windy Mcelroy  Clinician NPI: [id number]  Patient: Christina Guzman  Patient : 1965  Patient Address: [address] [place] [address]  Patient Phone: [phone]  Visit Protocol: URI  Patient Summary:  Christina is a 55 year old ( : 1965 ) female who initiated a [facility] Visit for COVID-19 (Coronavirus) evaluation and screening. When asked the question \"Please sign me up to receive news, health information and promotions from [facility].\", Christina responded \"Yes\".    Christina states her symptoms started gradually 3-4 days ago.   Her symptoms consist of a headache, a cough, nasal congestion, myalgia, chills, malaise, a sore throat, and ageusia.   Symptom details     Nasal secretions: The color of her mucus is clear.    Cough: Christina coughs a few times an hour and her cough is not more bothersome at night. Phlegm does not come into her throat when she coughs. She does not believe her cough is caused by post-nasal drip.     Sore throat: Christina reports having mild throat pain (1-3 on a 10 point pain scale), does not have exudate on her tonsils, and can swallow liquids. She is not sure if the lymph nodes in her neck are enlarged. A rash has not appeared on the skin since the sore throat started.     Headache: She states the headache is moderate (4-6 on a 10 point pain scale).      Christina denies having ear pain, wheezing, fever, anosmia, vomiting, rhinitis, nausea, facial pain or pressure, teeth pain, and diarrhea. She also denies double sickening (worsening symptoms after initial improvement), taking antibiotic medication in the past month, and having recent facial or sinus surgery in the past 60 days. She is not experiencing dyspnea.   Precipitating events  Within the past week, Christina has not been exposed to someone with strep throat. She has not recently been exposed to someone with influenza. Christina has been in close contact with the following high risk " individuals: people with asthma, heart disease or diabetes, immunocompromised people, and children under the age of 5.   Pertinent COVID-19 (Coronavirus) information  In the past 14 days, Christina has not worked in a congregate living setting.   She does not work or volunteer as healthcare worker or a  and does not work or volunteer in a healthcare facility.   Christina also has not lived in a congregate living setting in the past 14 days. She lives with a healthcare worker.   Christina has had a close contact with a laboratory-confirmed COVID-19 patient within 14 days of symptom onset. Additional information about contact with COVID-19 (Coronavirus) patient as reported by the patient (free text): My son, who lives with me.   Since December 2019, Christina and has had upper respiratory infection (URI) or influenza-like illness. Has not been diagnosed with lab-confirmed COVID-19 test      Date(s) of previous URI or influenza-like illness (free-text): Over the past week     Symptoms Christina experienced during previous URI or influenza-like illness as reported by the patient (free-text): Very tired, some nausea, headache, stuffy nose        Pertinent medical history  Christina typically gets a yeast infection when she takes antibiotics. She is not sure if she has used fluconazole (Diflucan) to treat previous yeast infections.   Christina does not need a return to work/school note.   Weight: 200 lbs   Christina does not smoke or use smokeless tobacco.   Weight: 200 lbs    MEDICATIONS: omeprazole oral, atorvastatin oral, ALLERGIES: NKDA  Clinician Response:  Dear Christina,  Based on the information provided, you have a viral upper respiratory infection, otherwise known as a cold. Symptoms vary from person to person, but can include sneezing, coughing, a runny nose, sore throat, and headache and range from mild to severe.  Unfortunately, there are no medications that can cure a cold, so treatment is focused on  controlling symptoms as much as possible. Most people gradually feel better until symptoms are gone in 1-2 weeks.  Medication information  Because you have a viral infection, antibiotics will not help you get better. Treating a viral infection with antibiotics could actually make you feel worse.  Self care  Steps you can take to be as comfortable as possible:     Rest.    Drink plenty of fluids.    Take a warm shower to loosen congestion    Use a cool-mist humidifier.    Use throat lozenges.    Suck on frozen items such as popsicles.    Drink hot tea with lemon and honey.    Gargle with warm salt water (1/4 teaspoon of salt per 8 ounce glass of water).    Take a spoonful of honey to reduce your cough.     When to seek care  Please be seen in a clinic or urgent care if any of the following occur:   New symptoms develop, or symptoms become worse   Call ahead before going to the clinic or urgent care.  Call 911 or go to the emergency room if you feel that your throat is closing off, you suddenly develop a rash, you are unable to swallow fluids, you are drooling, or you are having difficulty breathing.  Additional treatment plan   Your symptoms show that you may have coronavirus (COVID-19). This illness can cause fever, cough and trouble breathing. Many people get a mild case and get better on their own. Some people can get very sick.  Based on the symptoms you have shared, I would like you to be re-checked in 2 to 3 days. Please call your family clinic to set up a video or phone visit.  Will I be tested for COVID-19?  We would like to test you for this virus.   Please call [phone] to schedule your visit. Explain that you were referred by [facility] to have a COVID-19 test. Be ready to share your [facility] visit ID number.  Please note that if you are assessed for Covid-19 testing and receive an order for testing from [facility], that the scheduling of your Covid test at [facility] may be delayed by three or four days  "or more due to limited availability for testing. Additional options for testing can be found on the [place] Covid-19 Response website. https://[place].gov/covid19/     The following will serve as your written order for this COVID Test, ordered by me, for the indication of suspected COVID [Z20.828]: The test will be ordered in [employer], our electronic health record, after you are scheduled. It will show as ordered and authorized by Zan Carter MD.  Order: COVID-19 (Coronavirus) PCR for SYMPTOMATIC testing from [facility].   1.When it's time for your COVID test:   Stay at least 6 feet away from others. (If someone will drive you to your test, stay in the backseat, as far away from the  as you can.)   Cover your mouth and nose with a mask, tissue or washcloth.  Go straight to the testing site. Don't make any stops on the way there or back.      2.Starting now: Stay home and away from others (self-isolate) until:   You've had no fever---and no medicine that reduces fever---for one full day (24 hours). And...   Your other symptoms have gotten better. For example, your cough or breathing has improved. And...   At least 10 days have passed since your symptoms started.       During this time, don't leave the house except for testing or medical care.   Stay in your own room, even for meals. Use your own bathroom if you can.   Stay away from others in your home. No hugging, kissing or shaking hands. No visitors.  Don't go to work, school or anywhere else.    Clean \"high touch\" surfaces often (doorknobs, counters, handles, etc.). Use a household cleaning spray or wipes. You'll find a full list of  on the EPA website: www.epa.gov/pesticide-registration/list-n-disinfectants-use-against-sars-cov-2.   Cover your mouth and nose with a mask, tissue or washcloth to avoid spreading germs.  Wash your hands and face often. Use soap and water.  Caregivers in these groups are at risk for severe illness due to COVID-19:  o People 65 " years and older  o People who live in a nursing home or long-term care facility  o People with chronic disease (lung, heart, cancer, diabetes, kidney, liver, immunologic)   o People who have a weakened immune system, including those who:   Are in cancer treatment  Take medicine that weakens the immune system, such as corticosteroids  Had a bone marrow or organ transplant  Have an immune deficiency  Have poorly controlled HIV or AIDS  Are obese (body mass index of 40 or higher)  Smoke regularly   o Caregivers should wear gloves while washing dishes, handling laundry and cleaning bedrooms and bathrooms.  o Use caution when washing and drying laundry: Don't shake dirty laundry, and use the warmest water setting that you can.  o For more tips, go to www.cdc.gov/coronavirus/2019-ncov/downloads/10Things.pdf.      How can I take care of myself?   Get lots of rest. Drink extra fluids (unless a doctor has told you not to)   Take Tylenol (acetaminophen) for fever or pain. If you have liver or kidney problems, ask your family doctor if it's okay to take Tylenol.   Adults can take either:    650 mg (two 325 mg pills) every 4 to 6 hours, or...   1,000 mg (two 500 mg pills) every 8 hours as needed.    Note: Don't take more than 3,000 mg in one day. Acetaminophen is found in many medicines (both prescribed and over-the-counter medicines). Read all labels to be sure you don't take too much.   For children, check the Tylenol bottle for the right dose. The dose is based on the child's age or weight.    If you have other health problems (like cancer, heart failure, an organ transplant or severe kidney disease): Call your specialty clinic if you don't feel better in the next 2 days.       Know when to call 911. Emergency warning signs include:    Trouble breathing or shortness of breath Pain or pressure in the chest that doesn't go away Feeling confused like you haven't felt before, or not being able to wake up Bluish-colored lips or  face  Where can I get more information?   [facility] -- About COVID-19: [email or web address]   CDC -- What to Do If You're Sick: www.cdc.gov/coronavirus/2019-ncov/about/steps-when-sick.html   CDC -- Ending Home Isolation: www.cdc.gov/coronavirus/2019-ncov/hcp/disposition-in-home-patients.html   [facility] -- Caring for Someone: www.cdc.gov/coronavirus/2019-ncov/if-you-are-sick/care-for-someone.html   [facility] -- Interim Guidance for Hospital Discharge to Home: www.[facility].[place]/diseases/coronavirus/hcp/hospdischarge.pdf   [facility] clinical trials (COVID-19 research studies): clinicalaffairs.[facility]-clinical-trials    Below are the COVID-19 hotlines at the [place] Department of Health ([facility]). Interpreters are available.    For health questions: Call 688-093-9821 or 1-681.982.5607 (7 a.m. to 7 p.m.) For questions about schools and childcare: Call 711-113-3363 or 1-474.365.9336 (7 a.m. to 7 p.m.)       Diagnosis: Contact with and (suspected) exposure to other viral communicable diseases  Diagnosis ICD: Z20.828

## 2020-10-26 LAB
SARS-COV-2 RNA SPEC QL NAA+PROBE: NOT DETECTED
SPECIMEN SOURCE: NORMAL

## 2020-10-31 ENCOUNTER — VIRTUAL VISIT (OUTPATIENT)
Dept: FAMILY MEDICINE | Facility: OTHER | Age: 55
End: 2020-10-31
Payer: COMMERCIAL

## 2020-10-31 PROCEDURE — 99421 OL DIG E/M SVC 5-10 MIN: CPT | Performed by: PHYSICIAN ASSISTANT

## 2020-10-31 NOTE — PROGRESS NOTES
"Date: 10/31/2020 10:12:51  Clinician: Ben Zuleta  Clinician NPI: [id number]  Patient: Christina Guzman  Patient : 1965  Patient Address: [address] [place] [address]  Patient Phone: [phone]  Visit Protocol: URI  Patient Summary:  Christina is a 55 year old ( : 1965 ) female who initiated a [facility] Visit for COVID-19 (Coronavirus) evaluation and screening. When asked the question \"Please sign me up to receive news, health information and promotions from [facility].\", Christina responded \"No\".    Christina states her symptoms started gradually 3-4 days ago. After her symptoms started, they improved and then got worse again.   Her symptoms consist of myalgia, malaise, and ageusia. She is experiencing mild difficulty breathing with activities but can speak normally in full sentences.   Christina denies having ear pain, headache, wheezing, fever, cough, nasal congestion, nausea, facial pain or pressure, chills, sore throat, teeth pain, diarrhea, anosmia, vomiting, and rhinitis. She also denies having recent facial or sinus surgery in the past 60 days and taking antibiotic medication in the past month.   Precipitating events  She has not recently been exposed to someone with influenza. Christina has been in close contact with the following high risk individuals: immunocompromised people and people with asthma, heart disease or diabetes.   Pertinent COVID-19 (Coronavirus) information  Christina does not work or volunteer as healthcare worker or a . In the past 14 days, Christina has not worked or volunteered at a healthcare facility or group living setting.   In the past 14 days, she also has not lived in a congregate living setting.   Christina has had a close contact with a laboratory-confirmed COVID-19 patient within 14 days of symptom onset. She was not exposed at her work. Date Christina was exposed to the laboratory-confirmed COVID-19 patient: 10/25/2020   Additional information about " contact with COVID-19 (Coronavirus) patient as reported by the patient (free text): My son's COVID-19 test came back positive on 10/25/2020. He lives with me so I have been in close contact with him on a daily basis.    Since December 2019, Christina has not been diagnosed with lab-confirmed COVID-19 test and has had upper respiratory infection (URI) or influenza-like illness.      Date(s) of previous URI or influenza-like illness (free-text): Starting on Tuesday 10/27/2020     Symptoms Christina experienced during previous URI or influenza-like illness as reported by the patient (free-text): Temp of 100.5, terrible body aches, weakness, headache, chills        Pertinent medical history  Christina typically gets a yeast infection when she takes antibiotics. She is not sure if she has used fluconazole (Diflucan) to treat previous yeast infections.   Christina does not need a return to work/school note.   Weight: 200 lbs   Christina does not smoke or use smokeless tobacco.   Additional information as reported by the patient (free text): I was tested last Sunday for COVID, but my results came negative. My fever has broken &amp; I thought I was getting better but I'm having increasing pain &amp; heaviness in my chest.   Weight: 200 lbs    MEDICATIONS: omeprazole oral, atorvastatin oral, ALLERGIES: NKDA  Clinician Response:  Dear Christina,   Based on your exposure to COVID-19 (coronavirus), we would like to test you for this virus.  1. Please call [phone] to schedule your visit. Explain that you were referred by [facility] to have a COVID-19 test. Be ready to share your [facility] visit ID number.   The following will serve as your written order for this COVID Test, ordered by me, for the indication of suspected COVID [Z20.828]: The test will be ordered in [employer], our electronic health record, after you are scheduled. It will show as ordered and authorized by Zan Carter MD.  Order: COVID-19 (coronavirus) PCR for ASYMPTOMATIC EXPOSURE  testing [facility].   If you know you have had close contact with someone who tested positive, you should be quarantined for 14 days after this exposure. You should stay in quarantine for the14 days even if the covid test is negative, the optimal time to test after exposure is 5-7 days from the exposure  Quarantine means   What should I do?  For safety, it's very important to follow these rules. Do this for 14 days after the date you were last exposed to the virus..  Stay home and away from others. Don't go to school or anywhere else. Generally quarantine means staying home from work but there are some exceptions to this. Please contact your workplace.   No hugging, kissing or shaking hands.  Don't let anyone visit.  Cover your mouth and nose with a mask, tissue or washcloth to avoid spreading germs.  Wash your hands and face often. Use soap and water.  What are the symptoms of COVID-19?  The most common symptoms are cough, fever and trouble breathing. Less common symptoms include headache, body aches, fatigue (feeling very tired), chills, sore throat, stuffy or runny nose, diarrhea (loose poop), loss of taste or smell, belly pain, and nausea or vomiting (feeling sick to your stomach or throwing up).  After 14 days, if you have still don't have symptoms, you likely don't have this virus.  If you develop symptoms, follow these guidelines.  If you're normally healthy: Please start another [facility] visit to report your symptoms. Go to [email or web address].  If you have a serious health problem (like cancer, heart failure, an organ transplant or kidney disease): Call your specialty clinic. Let them know that you might have COVID-19.  2. When it's time for your COVID test:  Stay at least 6 feet away from others. (If someone will drive you to your test, stay in the backseat, as far away from the  as you can.)  Cover your mouth and nose with a mask, tissue or washcloth.  Go straight to the testing site. Don't make any stops on  the way there or back.  Please note  Caregivers in these groups are at risk for severe illness due to COVID-19:  o People 65 years and older  o People who live in a nursing home or long-term care facility  o People with chronic disease (lung, heart, cancer, diabetes, kidney, liver, immunologic)  o People who have a weakened immune system, including those who:  Are in cancer treatment  Take medicine that weakens the immune system, such as corticosteroids  Had a bone marrow or organ transplant  Have an immune deficiency  Have poorly controlled HIV or AIDS  Are obese (body mass index of 40 or higher)  Smoke regularly  Where can I get more information?  [facility] -- About COVID-19: [email or web address]  CDC -- What to Do If You're Sick: www.cdc.gov/coronavirus/2019-ncov/about/steps-when-sick.html  [facility] -- Ending Home Isolation: www.cdc.gov/coronavirus/2019-ncov/hcp/disposition-in-home-patients.html  [facility] -- Caring for Someone: www.cdc.gov/coronavirus/2019-ncov/if-you-are-sick/care-for-someone.html  [facility] -- Interim Guidance for Hospital Discharge to Home: www.health.[facility].[place]/diseases/coronavirus/hcp/hospdischarge.pdf  [facility] clinical trials (COVID-19 research studies): clinicalaffairs.[facility]-clinical-trials  Below are the COVID-19 hotlines at the [place] Department of Health ([facility]). Interpreters are available.  For health questions: Call 044-520-7758 or 1-323.795.3534 (7 a.m. to 7 p.m.)  For questions about schools and childcare: Call 071-689-3622 or 1-570.921.3705 (7 a.m. to 7 p.m.)    Diagnosis: Contact with and (suspected) exposure to other viral communicable diseases  Diagnosis ICD: Z20.828

## 2020-11-04 ENCOUNTER — AMBULATORY - HEALTHEAST (OUTPATIENT)
Dept: FAMILY MEDICINE | Facility: CLINIC | Age: 55
End: 2020-11-04

## 2020-11-04 DIAGNOSIS — Z20.822 SUSPECTED COVID-19 VIRUS INFECTION: ICD-10-CM

## 2020-11-05 ENCOUNTER — AMBULATORY - HEALTHEAST (OUTPATIENT)
Dept: FAMILY MEDICINE | Facility: CLINIC | Age: 55
End: 2020-11-05

## 2020-11-05 DIAGNOSIS — Z20.822 SUSPECTED COVID-19 VIRUS INFECTION: ICD-10-CM

## 2020-11-07 ENCOUNTER — COMMUNICATION - HEALTHEAST (OUTPATIENT)
Dept: SCHEDULING | Facility: CLINIC | Age: 55
End: 2020-11-07

## 2020-11-07 ENCOUNTER — COMMUNICATION - HEALTHEAST (OUTPATIENT)
Dept: LAB | Facility: CLINIC | Age: 55
End: 2020-11-07

## 2020-11-07 ENCOUNTER — VIRTUAL VISIT (OUTPATIENT)
Dept: FAMILY MEDICINE | Facility: OTHER | Age: 55
End: 2020-11-07
Payer: COMMERCIAL

## 2020-11-07 PROCEDURE — 99421 OL DIG E/M SVC 5-10 MIN: CPT | Performed by: PHYSICIAN ASSISTANT

## 2020-11-07 NOTE — PROGRESS NOTES
"Date: 2020 13:40:38  Clinician: Frank Becerril  Clinician NPI: [id number]  Patient: Christina Guzman  Patient : 1965  Patient Address: [address] [place] [id number]  Patient Phone: [phone]  Visit Protocol: URI  Patient Summary:  Christina is a 55 year old ( : 1965 ) female who initiated a [facility] Visit for COVID-19 (Coronavirus) evaluation and screening. When asked the question \"Please sign me up to receive news, health information and promotions from [facility].\", Chrsitina responded \"Yes\".    Christina states her symptoms started suddenly 10-13 days ago. After her symptoms started, they improved and then got worse again.   Her symptoms consist of myalgia, chills, malaise, ageusia, a headache, a cough, and anosmia. She is experiencing mild difficulty breathing with activities but can speak normally in full sentences. Christina also feels feverish.   Symptom details     Cough: Christina coughs a few times an hour and her cough is not more bothersome at night. Phlegm does not come into her throat when she coughs. She does not believe her cough is caused by post-nasal drip.     Temperature: Her current temperature is 99 degrees Fahrenheit.     Headache: She states the headache is moderate (4-6 on a 10 point pain scale).      Christina denies having vomiting, rhinitis, facial pain or pressure, sore throat, teeth pain, diarrhea, ear pain, wheezing, nasal congestion, and nausea. She also denies taking antibiotic medication in the past month, having recent facial or sinus surgery in the past 60 days, and having a sinus infection within the past year.   Precipitating events  She has not recently been exposed to someone with influenza. Christina has been in close contact with the following high risk individuals: people with asthma, heart disease or diabetes.   Pertinent COVID-19 (Coronavirus) information  Christina does not work or volunteer as healthcare worker or a . In the past 14 days, " Christina has not worked or volunteered at a healthcare facility or group living setting.   In the past 14 days, she also has not lived in a congregate living setting.   Christina has had a close contact with a laboratory-confirmed COVID-19 patient within 14 days of symptom onset. She was not exposed at her work. Date Christina was exposed to the laboratory-confirmed COVID-19 patient: 10/22/2020   Additional information about contact with COVID-19 (Coronavirus) patient as reported by the patient (free text): My son tested positive on 10/22/2020. My son lives with me.    Since December 2019, Christina has been tested for COVID-19 and has had upper respiratory infection (URI) or influenza-like illness.      Result of COVID-19 test: Positive      Date of her COVID-19 test: 11/05/2020     Date(s) of previous URI or influenza-like illness (free-text): Began on 10/26/2020, with the fever starting on 10/27/2020     Symptoms Christina experienced during previous URI or influenza-like illness as reported by the patient (free-text): Fever, body aches, no taste, coughing, back pain, fatigue        Pertinent medical history  Christina typically gets a yeast infection when she takes antibiotics. She is not sure if she has used fluconazole (Diflucan) to treat previous yeast infections.   Christina does not need a return to work/school note.   Weight: 200 lbs   Christina does not smoke or use smokeless tobacco.   Additional information as reported by the patient (free text): The nurse who gave me my test results today said I should be in contact with a doctor since I've had a fever for 12 days. I can get my fever down with Tylenol, but as the Tylenol wares off, my temp goes back up to 100 - 101. I'm also sleeping 16 or more hours a day.   Weight: 200 lbs    MEDICATIONS: omeprazole oral, atorvastatin oral, ALLERGIES: NKDA  Clinician Response:  Dear Christina,   Your symptoms show that you may have coronavirus (COVID-19). This illness can cause  "fever, cough and trouble breathing. Many people get a mild case and get better on their own. Some people can get very sick.  What should I do?  We would like to test you for this virus.   1. Please call [phone] to schedule your visit. Explain that you were referred by [facility] to have a COVID-19 test. Be ready to [facility].  * If you need to schedule in [facility] please call [phone] or for [employer] employees please call [phone].  * If you need to schedule in the [place] area please call [phone]. [employer] employees call [phone].  The following will serve as your written order for this COVID Test, ordered by me, for the indication of suspected COVID [Z20.828]: The test will be ordered in [employer], our electronic health record, after you are scheduled. It will show as ordered and authorized by Zan Carter MD.  Order: COVID-19 (Coronavirus) PCR for SYMPTOMATIC testing from [facility].   2. When it's time for your COVID test:  Stay at least 6 feet away from others. (If someone will drive you to your test, stay in the backseat, as far away from the  as you can.)   Cover your mouth and nose with a mask, tissue or washcloth.  Go straight to the testing site. Don't make any stops on the way there or back.      3.Starting now: Stay home and away from others (self-isolate) until:   You've had no fever---and no medicine that reduces fever---for one full day (24 hours). And...   Your other symptoms have gotten better. For example, your cough or breathing has improved. And...   At least 10 days have passed since your symptoms started.       During this time, don't leave the house except for testing or medical care.   Stay in your own room, even for meals. Use your own bathroom if you can.   Stay away from others in your home. No hugging, kissing or shaking hands. No visitors.  Don't go to work, school or anywhere else.    Clean \"high touch\" surfaces often (doorknobs, counters, " handles, etc.). Use a household cleaning spray or wipes. You'll find a full list of  on the EPA website: www.epa.gov/pesticide-registration/list-n-disinfectants-use-against-sars-cov-2.   Cover your mouth and nose with a mask, tissue or washcloth to avoid spreading germs.  Wash your hands and face often. Use soap and water.  Caregivers in these groups are at risk for severe illness due to COVID-19:  o People 65 years and older  o People who live in a nursing home or long-term care facility  o People with chronic disease (lung, heart, cancer, diabetes, kidney, liver, immunologic)  o People who have a weakened immune system, including those who:   Are in cancer treatment  Take medicine that weakens the immune system, such as corticosteroids  Had a bone marrow or organ transplant  Have an immune deficiency  Have poorly controlled HIV or AIDS  Are obese (body mass index of 40 or higher)  Smoke regularly   o Caregivers should wear gloves while washing dishes, handling laundry and cleaning bedrooms and bathrooms.  o Use caution when washing and drying laundry: Don't shake dirty laundry, and use the warmest water setting that you can.  o For more tips, go to www.cdc.gov/coronavirus/2019-ncov/downloads/10Things.pdf.    4.Sign up for [employer]. We know it's scary to hear that you might have COVID-19. We want to track your symptoms to make sure you're okay over the next 2 weeks. Please look for an email from [employer]---this is a free, online program that we'll use to keep in touch. To sign up, follow the link in the email. Learn more at [email or web address]  How can I take care of myself?   Get lots of rest. Drink extra fluids (unless a doctor has told you not to).   Take Tylenol (acetaminophen) for fever or pain. If you have liver or kidney problems, ask your family doctor if it's okay to take Tylenol.   Adults can take either:    650 mg (two 325 mg pills) every 4 to 6 hours, or...   1,000 mg (two  500 mg pills) every 8 hours as needed.    Note: Don't take more than 3,000 mg in one day. Acetaminophen is found in many medicines (both prescribed and over-the-counter medicines). Read all labels to be sure you don't take too much.   For children, check the Tylenol bottle for the right dose. The dose is based on the child's age or weight.    If you have other health problems (like cancer, heart failure, an organ transplant or severe kidney disease): Call your specialty clinic if you don't feel better in the next 2 days.       Know when to call 911. Emergency warning signs include:    Trouble breathing or shortness of breath Pain or pressure in the chest that doesn't go away Feeling confused like you haven't felt before, or not being able to wake up Bluish-colored lips or face.  Where can I get more information?   [facility] -- About COVID-19: [email or web address]   CDC -- What to Do If You're Sick: www.cdc.gov/coronavirus/2019-ncov/about/steps-when-sick.html   CDC -- Ending Home Isolation: www.cdc.gov/coronavirus/2019-ncov/hcp/disposition-in-home-patients.html   CDC -- Caring for Someone: www.cdc.gov/coronavirus/2019-ncov/if-you-are-sick/care-for-someone.html   [facility] -- Interim Guidance for Hospital Discharge to Home: www.health.[facility].[place]/diseases/coronavirus/hcp/hospdischarge.pdf   [facility] clinical trials (COVID-19 research studies): [email or web address]-trials    Below are the COVID-19 hotlines at the [place] Department of Health ([facility]). Interpreters are available.    For health questions: Call 264-526-6141 or 1-534.393.5327 (7 a.m. to 7 p.m.) For questions about schools and childcare: Call 333-555-3688 or 1-241.342.5189 (7 a.m. to 7 p.m.)    Diagnosis: Contact with and (suspected) exposure to other viral communicable diseases  Diagnosis ICD: Z20.824  Additional Clinician Notes:   If your symptoms are not improving or worsen, please go to one of our urgent care  locations for evaluation and possible lab work.

## 2021-01-05 DIAGNOSIS — N95.2 VAGINAL ATROPHY: ICD-10-CM

## 2021-01-06 RX ORDER — ESTRADIOL 0.1 MG/G
2 CREAM VAGINAL
Qty: 42.5 G | Refills: 3 | Status: SHIPPED | OUTPATIENT
Start: 2021-01-07 | End: 2022-01-17

## 2021-01-14 ENCOUNTER — HEALTH MAINTENANCE LETTER (OUTPATIENT)
Age: 56
End: 2021-01-14

## 2021-03-14 ENCOUNTER — HEALTH MAINTENANCE LETTER (OUTPATIENT)
Age: 56
End: 2021-03-14

## 2021-03-26 ENCOUNTER — OFFICE VISIT (OUTPATIENT)
Dept: URGENT CARE | Facility: URGENT CARE | Age: 56
End: 2021-03-26
Payer: COMMERCIAL

## 2021-03-26 ENCOUNTER — NURSE TRIAGE (OUTPATIENT)
Dept: FAMILY MEDICINE | Facility: CLINIC | Age: 56
End: 2021-03-26

## 2021-03-26 VITALS
OXYGEN SATURATION: 99 % | DIASTOLIC BLOOD PRESSURE: 85 MMHG | TEMPERATURE: 97.4 F | SYSTOLIC BLOOD PRESSURE: 134 MMHG | HEART RATE: 100 BPM

## 2021-03-26 DIAGNOSIS — Z86.73 HISTORY OF TIA (TRANSIENT ISCHEMIC ATTACK) AND STROKE: ICD-10-CM

## 2021-03-26 DIAGNOSIS — R07.9 CHEST PAIN, UNSPECIFIED TYPE: Primary | ICD-10-CM

## 2021-03-26 DIAGNOSIS — Z98.890 H/O CAROTID ENDARTERECTOMY: ICD-10-CM

## 2021-03-26 PROCEDURE — 99215 OFFICE O/P EST HI 40 MIN: CPT | Performed by: FAMILY MEDICINE

## 2021-03-26 PROCEDURE — 93000 ELECTROCARDIOGRAM COMPLETE: CPT | Performed by: FAMILY MEDICINE

## 2021-03-26 NOTE — PROGRESS NOTES
Chief complaint: frequent headache    Frequent headaches for the past 2 months     2018 had TIA   Symptoms felt funny at that time  Had confusion and lost consciousness  Found to have carotid stenosis right s/p repair 2018     Since then has quit smoking and drinking     However the past 2 months started having more frequent headaches so is just worried that she might have a stenosis     Past few weeks also off and on chest pain      Allergies   Allergen Reactions     Clindamycin        Past Medical History:   Diagnosis Date     Chronic idiopathic urticaria x 9/09    IgE 638, Tryptase=11 (min. elevated)     Fructose disorder     Fructose Malabsorbtion     House dust mite allergy 9/7/10 RAST    DM only     Menopausal depression 12/11/2012     Migraines        atorvastatin (LIPITOR) 40 MG tablet, TAKE 1 TABLET DAILY  cyclobenzaprine (FLEXERIL) 10 MG tablet, Take 0.5-1 tablets (5-10 mg) by mouth 2 times daily as needed for muscle spasms  EPINEPHrine (EPIPEN/ADRENACLICK/OR ANY BX GENERIC EQUIV) 0.3 MG/0.3ML injection 2-pack, Inject 0.3 mLs (0.3 mg) into the muscle once as needed for anaphylaxis  estradiol (ESTRACE) 0.1 MG/GM vaginal cream, Place 2 g vaginally twice a week  omeprazole (PRILOSEC) 40 MG DR capsule, Take 1 capsule (40 mg) by mouth daily  triamcinolone (KENALOG) 0.1 % external cream, Apply topically 2 times daily  hyoscyamine (LEVSIN/SL) 0.125 MG sublingual tablet,     No current facility-administered medications on file prior to visit.       Social History     Tobacco Use     Smoking status: Former Smoker     Packs/day: 0.50     Years: 30.00     Pack years: 15.00     Types: Cigarettes     Smokeless tobacco: Never Used   Substance Use Topics     Alcohol use: No     Frequency: Never     Comment: occ     Drug use: No       ROS:   review of systems negative except for noted above.   No thoughts of harming self or others.     OBJECTIVE:  /85   Pulse 100   Temp 97.4  F (36.3  C) (Tympanic)   LMP  05/25/2018 (Exact Date)   SpO2 99%    General:   awake, alert, and cooperative.  NAD.   Head: Normocephalic, atraumatic.  Eyes: Conjunctiva clear,   Heart: Regular rate and rhythm. No murmur.  Lungs: Chest is clear; no wheezes or rales.   Abdomen: soft non-tender.  Neuro: Alert and oriented - normal speech.  MS: Using extremities freely  PSYCH:  Normal affect, normal speech  SKIN: no obvious rashes    ASSESSMENT:    ICD-10-CM    1. Chest pain, unspecified type  R07.9 EKG 12-lead complete w/read - Clinics   2. H/O carotid endarterectomy  Z98.890    3. History of TIA (transient ischemic attack) and stroke  Z86.73        PLAN:   History of TIA with carotid artery disease s/p endarterectomy 2018   Off and on chest pain midsternal past few weeks   Worsening headache as well  EKG showed flattening of Twaves V2,V3,V4 to my personal review   Recommend ER for complete acute cardiorespiratory rule out   Patient declined ambulance, risks of transport discussed  EKG printed with AVS with sign out information.     Elizabeth Bertrand MD

## 2021-03-26 NOTE — TELEPHONE ENCOUNTER
Additional Information    Negative: Difficult to awaken or acting confused (e.g., disoriented, slurred speech)    Negative: Weakness of the face, arm or leg on one side of the body and new onset    Negative: Numbness of the face, arm or leg on one side of the body and new onset    Negative: Loss of speech or garbled speech and new onset    Negative: Passed out (i.e., fainted, collapsed and was not responding)    Negative: Sounds like a life-threatening emergency to the triager    Negative: Followed a head injury within last 3 days    Negative: Traumatic Brain Injury (TBI) is suspected    Negative: Sinus pain of forehead and yellow or green nasal discharge    Negative: Pregnant    Negative: Unable to walk without falling    Negative: Stiff neck (can't touch chin to chest)    Negative: Possibility of carbon monoxide exposure    Negative: Fever > 103 F (39.4 C)    Negative: Fever > 100.0 F (37.8 C) and has diabetes mellitus or a weak immune system (e.g., HIV positive, cancer chemotherapy, organ transplant, splenectomy, chronic steroids)    Negative: SEVERE headache, states 'worst headache' of life    Negative: SEVERE headache, sudden onset (i.e., reaching maximum intensity within 30 seconds)    Negative: Severe pain in one eye    Negative: Loss of vision or double vision     Zero sx today, reports does have spots of loss of vision with her severe headaches sometimes.    Negative: Patient sounds very sick or weak to the triager    Negative: SEVERE headache (e.g., excruciating) and has had severe headaches before    Negative: SEVERE headache and not relieved by pain meds    Negative: SEVERE headache and vomiting    Negative: SEVERE headache and fever    Patient wants to be seen     Pt wants Video Visit to discuss, RN encouraged urgent care visit tonight instead of Virtual Visit    Negative: New headache and weak immune system (e.g., HIV positive, cancer chemotherapy, chronic steroid treatment)    Negative: Fever  "present > 3 days (72 hours)    Answer Assessment - Initial Assessment Questions  1. LOCATION: \"Where does it hurt?\"       No headache at this moment. HAs tend to be more one sided when present. Mild headache yesterday, Severe headache Tue and Wed    2. ONSET: \"When did the headache start?\" (Minutes, hours or days)       No headache at this time. Pain is currently 0.    3. PATTERN: \"Does the pain come and go, or has it been constant since it started?\"      \"sometimes I wake up in the middle of the night and have to take something to go back to sleep.\" Headache 10/10 on Saturday night, Headache Tue and Wed was 7-8/10.    4. SEVERITY: \"How bad is the pain?\" and \"What does it keep you from doing?\"  (e.g., Scale 1-10; mild, moderate, or severe)    - MILD (1-3): doesn't interfere with normal activities     - MODERATE (4-7): interferes with normal activities or awakens from sleep     - SEVERE (8-10): excruciating pain, unable to do any normal activities         Varies, no pain today. Mild headache yesterday, sometimes severe.    5. RECURRENT SYMPTOM: \"Have you ever had headaches before?\" If so, ask: \"When was the last time?\" and \"What happened that time?\"       Intermittent severe headaches x couple months. Severe headaches 3-5 days per month.    6. CAUSE: \"What do you think is causing the headache?\"      Unknown, \"but what worries me, but especially the ones that wake me up in the middle of the night are like the headaches I got before my TIAs\"    7. MIGRAINE: \"Have you been diagnosed with migraine headaches?\" If so, ask: \"Is this headache similar?\"       \"I used to get migraines really bad and that's why my headaches didn't use to scare me.\"    Vascular surgery told pt not to take migraine medication anymore.    8. HEAD INJURY: \"Has there been any recent injury to the head?\"       no    9. OTHER SYMPTOMS: \"Do you have any other symptoms?\" (fever, stiff neck, eye pain, sore throat, cold symptoms)      Light sensitivity " when the severe headache is present. Couple of TIAs a couple years ago.     Denies tingling in arms. Sometimes pain in the chest only when standing up that lasts only a few seconds and then resolves, but has not occurred for a week.    Protocols used: HEADACHE-A-OH

## 2021-03-26 NOTE — TELEPHONE ENCOUNTER
"See full Assessment Question Responses.  Pt is completely asymptomatic today and only mild headache yesterday.     Based on frequency and severity of headaches in recent weeks, and hx of TIAs RN advised pt be seen for evaluation in urgent care today to determine appropriate plan. RN advised if severe HA returns or HA included vision change occurs, pt to go to ED with another person to drive.    Per chart review pt is scheduled for a Video Visit on 3/29/21 with the following appointment note: \"I'm having severe headaches similar to those I experienced around the time of my TIAs. I'm wondering if I need to have my carotid arteries checked.\"    MAXX BhaktaN, RN    "

## 2021-03-27 NOTE — PATIENT INSTRUCTIONS
History of TIA with carotid artery disease s/p endarterectomy 2018   Off and on chest pain midsternal past few weeks   Worsening headache as well  EKG showed flattening of Twaves V2,V3,V4  Recommend ER for complete acute cardiorespiratory rule out

## 2021-03-31 ENCOUNTER — IMMUNIZATION (OUTPATIENT)
Dept: NURSING | Facility: CLINIC | Age: 56
End: 2021-03-31
Payer: COMMERCIAL

## 2021-03-31 PROCEDURE — 0001A PR COVID VAC PFIZER DIL RECON 30 MCG/0.3 ML IM: CPT

## 2021-03-31 PROCEDURE — 91300 PR COVID VAC PFIZER DIL RECON 30 MCG/0.3 ML IM: CPT

## 2021-04-08 ENCOUNTER — TELEPHONE (OUTPATIENT)
Dept: FAMILY MEDICINE | Facility: CLINIC | Age: 56
End: 2021-04-08

## 2021-04-08 DIAGNOSIS — Z13.1 SCREENING FOR DIABETES MELLITUS: Primary | ICD-10-CM

## 2021-04-08 DIAGNOSIS — Z13.220 LIPID SCREENING: ICD-10-CM

## 2021-04-08 NOTE — TELEPHONE ENCOUNTER
Pt was told to come in for fasting labs but nothing ordered. Scheduled for lab tomorrow 7:15 am.    Thanks!    Thelma River Patient Representative

## 2021-04-09 DIAGNOSIS — Z13.220 LIPID SCREENING: ICD-10-CM

## 2021-04-09 DIAGNOSIS — Z13.1 SCREENING FOR DIABETES MELLITUS: ICD-10-CM

## 2021-04-09 LAB
ANION GAP SERPL CALCULATED.3IONS-SCNC: 4 MMOL/L (ref 3–14)
BUN SERPL-MCNC: 15 MG/DL (ref 7–30)
CALCIUM SERPL-MCNC: 9.1 MG/DL (ref 8.5–10.1)
CHLORIDE SERPL-SCNC: 109 MMOL/L (ref 94–109)
CHOLEST SERPL-MCNC: 164 MG/DL
CO2 SERPL-SCNC: 27 MMOL/L (ref 20–32)
CREAT SERPL-MCNC: 0.77 MG/DL (ref 0.52–1.04)
GFR SERPL CREATININE-BSD FRML MDRD: 87 ML/MIN/{1.73_M2}
GLUCOSE SERPL-MCNC: 95 MG/DL (ref 70–99)
HDLC SERPL-MCNC: 39 MG/DL
LDLC SERPL CALC-MCNC: 95 MG/DL
NONHDLC SERPL-MCNC: 125 MG/DL
POTASSIUM SERPL-SCNC: 4.1 MMOL/L (ref 3.4–5.3)
SODIUM SERPL-SCNC: 140 MMOL/L (ref 133–144)
TRIGL SERPL-MCNC: 150 MG/DL

## 2021-04-09 PROCEDURE — 80048 BASIC METABOLIC PNL TOTAL CA: CPT | Performed by: PHYSICIAN ASSISTANT

## 2021-04-09 PROCEDURE — 36415 COLL VENOUS BLD VENIPUNCTURE: CPT | Performed by: PHYSICIAN ASSISTANT

## 2021-04-09 PROCEDURE — 80061 LIPID PANEL: CPT | Performed by: PHYSICIAN ASSISTANT

## 2021-04-12 NOTE — RESULT ENCOUNTER NOTE
John Vasquez,       Your recent test results are attached, if you have any questions or concerns please feel free to contact me via e-mail or call [phone].  Cholesterol has improved. Sodium and potassium normal. Blood sugar (glucose) normal.  Creatinine and GFR normal, which means kidney function is normal.         Sincerely,  Patti Liang PA-C

## 2021-04-19 ENCOUNTER — OFFICE VISIT (OUTPATIENT)
Dept: FAMILY MEDICINE | Facility: CLINIC | Age: 56
End: 2021-04-19
Payer: COMMERCIAL

## 2021-04-19 VITALS
HEART RATE: 81 BPM | OXYGEN SATURATION: 100 % | RESPIRATION RATE: 16 BRPM | WEIGHT: 209 LBS | BODY MASS INDEX: 35.68 KG/M2 | TEMPERATURE: 97.2 F | HEIGHT: 64 IN | SYSTOLIC BLOOD PRESSURE: 127 MMHG | DIASTOLIC BLOOD PRESSURE: 86 MMHG

## 2021-04-19 DIAGNOSIS — E78.5 HYPERLIPIDEMIA LDL GOAL <130: ICD-10-CM

## 2021-04-19 DIAGNOSIS — E66.01 MORBID OBESITY (H): Primary | ICD-10-CM

## 2021-04-19 DIAGNOSIS — F43.21 ADJUSTMENT DISORDER WITH DEPRESSED MOOD: ICD-10-CM

## 2021-04-19 DIAGNOSIS — K21.9 GASTROESOPHAGEAL REFLUX DISEASE WITHOUT ESOPHAGITIS: ICD-10-CM

## 2021-04-19 DIAGNOSIS — Z12.31 ENCOUNTER FOR SCREENING MAMMOGRAM FOR BREAST CANCER: ICD-10-CM

## 2021-04-19 PROCEDURE — 99214 OFFICE O/P EST MOD 30 MIN: CPT | Performed by: PHYSICIAN ASSISTANT

## 2021-04-19 PROCEDURE — 96127 BRIEF EMOTIONAL/BEHAV ASSMT: CPT | Performed by: PHYSICIAN ASSISTANT

## 2021-04-19 RX ORDER — BUPROPION HYDROCHLORIDE 150 MG/1
150 TABLET ORAL EVERY MORNING
Qty: 30 TABLET | Refills: 1 | Status: SHIPPED | OUTPATIENT
Start: 2021-04-19 | End: 2021-04-19

## 2021-04-19 RX ORDER — BUPROPION HYDROCHLORIDE 150 MG/1
150 TABLET ORAL EVERY MORNING
Qty: 30 TABLET | Refills: 1 | Status: SHIPPED | OUTPATIENT
Start: 2021-04-19 | End: 2021-05-17

## 2021-04-19 RX ORDER — OMEPRAZOLE 40 MG/1
40 CAPSULE, DELAYED RELEASE ORAL DAILY
Qty: 90 CAPSULE | Refills: 3 | Status: SHIPPED | OUTPATIENT
Start: 2021-04-19 | End: 2022-02-11

## 2021-04-19 RX ORDER — ATORVASTATIN CALCIUM 40 MG/1
TABLET, FILM COATED ORAL
Qty: 90 TABLET | Refills: 3 | Status: SHIPPED | OUTPATIENT
Start: 2021-04-19 | End: 2022-02-11

## 2021-04-19 ASSESSMENT — ANXIETY QUESTIONNAIRES
7. FEELING AFRAID AS IF SOMETHING AWFUL MIGHT HAPPEN: NOT AT ALL
3. WORRYING TOO MUCH ABOUT DIFFERENT THINGS: NOT AT ALL
IF YOU CHECKED OFF ANY PROBLEMS ON THIS QUESTIONNAIRE, HOW DIFFICULT HAVE THESE PROBLEMS MADE IT FOR YOU TO DO YOUR WORK, TAKE CARE OF THINGS AT HOME, OR GET ALONG WITH OTHER PEOPLE: NOT DIFFICULT AT ALL
GAD7 TOTAL SCORE: 1
5. BEING SO RESTLESS THAT IT IS HARD TO SIT STILL: NOT AT ALL
2. NOT BEING ABLE TO STOP OR CONTROL WORRYING: NOT AT ALL
1. FEELING NERVOUS, ANXIOUS, OR ON EDGE: NOT AT ALL
6. BECOMING EASILY ANNOYED OR IRRITABLE: SEVERAL DAYS

## 2021-04-19 ASSESSMENT — PATIENT HEALTH QUESTIONNAIRE - PHQ9
SUM OF ALL RESPONSES TO PHQ QUESTIONS 1-9: 17
5. POOR APPETITE OR OVEREATING: NOT AT ALL

## 2021-04-19 ASSESSMENT — MIFFLIN-ST. JEOR: SCORE: 1533.02

## 2021-04-19 NOTE — PATIENT INSTRUCTIONS
1)     Re-check with me in 4-6 weeks.  In clinic or video.     Call with side effects or concerns.     Medication should start to work within 1-2 weeks but will not have full effect for about 6 weeks.     If medication makes you feel worse, stop right away and recheck with me.     If suicidal thoughts or plan occur, call 911 or go to emergency room.         2)  As your body allows, Try to start exercising see this article from [facility]:  Depression and anxiety: Exercise eases symptoms  Depression and anxiety symptoms often improve with exercise. Here are some realistic tips to help you get started and stay motivated.  By [facility] Staff   When you have depression or anxiety, exercise often seems like the last thing you want to do. But once you get motivated, exercise can make a big difference.  Exercise helps prevent and improve a number of health problems, including high blood pressure, diabetes and arthritis. Research on depression, anxiety and exercise shows that the psychological and physical benefits of exercise can also help improve mood and reduce anxiety.  The links between depression, anxiety and exercise aren't entirely clear -- but working out and other forms of physical activity can definitely ease symptoms of depression or anxiety and make you feel better. Exercise may also help keep depression and anxiety from coming back once you're feeling better.  How does exercise help depression and anxiety?  Regular exercise may help ease depression and anxiety by:  Releasing feel-good endorphins, natural cannabis-like brain chemicals (endogenous cannabinoids) and other natural brain chemicals that can enhance your sense of well-being   Taking your mind off worries so you can get away from the cycle of negative thoughts that feed depression and anxiety  Regular exercise has many psychological and emotional benefits, too. It can help you:  Gain confidence. Meeting exercise goals or challenges, even small  "ones, can boost your self-confidence. Getting in shape can also make you feel better about your appearance.   Get more social interaction. Exercise and physical activity may give you the chance to meet or socialize with others. Just exchanging a friendly smile or greeting as you walk around your neighborhood can help your mood.   [place] in a healthy way. Doing something positive to manage depression or anxiety is a healthy coping strategy. Trying to feel better by drinking alcohol, dwelling on how you feel, or hoping depression or anxiety will go away on its own can lead to worsening symptoms.  Is a structured exercise program the only option?  Some research shows that physical activity such as regular walking -- not just formal exercise programs -- may help improve mood. Physical activity and exercise are not the same thing, but both are beneficial to your health.  Physical activity is any activity that works your muscles and requires energy and can include work or household or leisure activities.   Exercise is a planned, structured and repetitive body movement done to improve or maintain physical fitness.  The word \"exercise\" may make you think of running laps around the gym. But exercise includes a wide range of activities that boost your activity level to help you feel better.  Certainly running, lifting weights, playing basketball and other fitness activities that get your heart pumping can help. But so can physical activity such as gardening, washing your car, walking around the block or engaging in other less intense activities. Any physical activity that gets you off the couch and moving can help improve your mood.  You don't have to do all your exercise or other physical activity at once. Broaden how you think of exercise and find ways to add small amounts of physical activity throughout your day. For example, take the stairs instead of the elevator. Park a little farther away from work to fit in a short " walk. Or, if you live close to your job, consider biking to work.              [facility] or through our clinic number      Lifestyle recommendations:  Being overweight or obese puts you are risk of major health problems including but not limited to: heart disease/heart attack, stroke, high cholesterol, high blood pressure, and diabetes.  This is why it is important to be at a healthy weight for your height.     Exercise 30 minutes 3-5 times a week, if you can only do 10 minutes 3 times a week that is still shown to have great benefit!  Brisk walking even counts for this.  Consider free youtube videos for exercise that fits your needs and lifestyle.     Monitor your caffeine and soda intake, try to minimize these beverages    Drink plenty of water (about 70-80 ounces a day)    Try to eat a vegetable and fruit  with lunch and dinner.  Have a breakfast that contains protein such as eggs or oatmeal.  Decrease your white bread, pasta, and sweets intake.  Increase lean proteins like chicken or pork. Try to eat out 1-2 times a week or less.  Monitor your portion sizes, try using smaller plates if needed.  Eat slowly, this gives you time to be aware that your body is full.   Let me know at any time if you would like a referral to a nutritionist!

## 2021-04-20 ASSESSMENT — ANXIETY QUESTIONNAIRES: GAD7 TOTAL SCORE: 1

## 2021-04-21 ENCOUNTER — IMMUNIZATION (OUTPATIENT)
Dept: NURSING | Facility: CLINIC | Age: 56
End: 2021-04-21
Attending: INTERNAL MEDICINE
Payer: COMMERCIAL

## 2021-04-21 PROCEDURE — 0002A PR COVID VAC PFIZER DIL RECON 30 MCG/0.3 ML IM: CPT

## 2021-04-21 PROCEDURE — 91300 PR COVID VAC PFIZER DIL RECON 30 MCG/0.3 ML IM: CPT

## 2021-04-23 NOTE — PROGRESS NOTES
Mirian is a 55 year old who is being evaluated via a billable video visit.      How would you like to obtain your AVS? MyChart  If the video visit is dropped, the invitation should be resent by: Text to cell phone: [phone]  Will anyone else be joining your video visit? No    Video Start Time: 7:26 AM    Assessment & Plan     Menopausal depression  Improved  Would like to try 300 mg see if she gets additional benefit  If not liking can ezio back to 150 mg she can just mychart message me  If doing well recheck 6 months with new dose  Call with side effects/concerns  - buPROPion (WELLBUTRIN XL) 300 MG 24 hr tablet; Take 1 tablet (300 mg) by mouth every morning Note increase in dose      Return in about 6 months (around 11/17/2021) for med recheck can be video .    Patti Liang PA-C  [facility]   Mirian is a 55 year old who presents for the following health issues  accompanied by her Self:    HPI     Depression Followup    How are you doing with your depression since your last visit? Improved     Are you having other symptoms that might be associated with depression? No    Have you had a significant life event?  No     Are you feeling anxious or having panic attacks?   No    Do you have any concerns with your use of alcohol or other drugs? No     Started Wellbutrin for depression last month. She feels much better on this. Mood is better.  Feels more motivated to exercise.  Feels like herself again.  Is on 150 mg XL.  Denies suicidal or homicidal thoughts.  Patient instructed to go to the emergency room or call 911 if these occur.    No side effects.     Per my last notes:  Adjustment disorder with depressed mood  Worsening  Side effects/expectations discussed  Went over handout below  See patient instructions below for more plan.  Consider adding or changing to prozac in future  Of trying effexor again     - MENTAL HEALTH REFERRAL  - Adult; Outpatient Treatment;  Individual/Couples/Family/Group Therapy/Health Psychology; Other: [facility] [phone]; We will contact you to schedule the appointment or please call with any questions  - buPROPion (WELLBUTRIN XL) 150 MG 24 hr tablet; Take 1 tablet (150 mg) by mouth every morning       Social History     Tobacco Use     Smoking status: Former Smoker     Packs/day: 0.50     Years: 30.00     Pack years: 15.00     Types: Cigarettes     Smokeless tobacco: Never Used   Substance Use Topics     Alcohol use: No     Frequency: Never     Comment: occ     Drug use: No     PHQ 4/19/2021   PHQ-9 Total Score 17   Q9: Thoughts of better off dead/self-harm past 2 weeks Not at all     CAROLINE-7 SCORE 12/11/2012 4/19/2021   Total Score 8 -   Total Score - 1     Last PHQ-9 5/17/2021   1.  Little interest or pleasure in doing things 0   2.  Feeling down, depressed, or hopeless 0   3.  Trouble falling or staying asleep, or sleeping too much 1   4.  Feeling tired or having little energy 1   5.  Poor appetite or overeating 0   6.  Feeling bad about yourself 0   7.  Trouble concentrating 0   8.  Moving slowly or restless 0   Q9: Thoughts of better off dead/self-harm past 2 weeks 0   PHQ-9 Total Score 2   Difficulty at work, home, or with people Not difficult at all     CAROLINE-7  5/17/2021   1. Feeling nervous, anxious, or on edge 0   2. Not being able to stop or control worrying 0   3. Worrying too much about different things 0   4. Trouble relaxing 0   5. Being so restless that it is hard to sit still 0   6. Becoming easily annoyed or irritable 0   7. Feeling afraid, as if something awful might happen 0   CAROLINE-7 Total Score 0   If you checked any problems, how difficult have they made it for you to do your work, take care of things at home, or get along with other people? Not difficult at all       Suicide Assessment Five-step Evaluation and Treatment (SAFE-T)      How many days per week do you miss taking your medication? 0        Review of  Systems   Constitutional, HEENT, cardiovascular, pulmonary, GI, , musculoskeletal, neuro, skin, endocrine and psych systems are negative, except as otherwise noted.      Objective           Vitals:  No vitals were obtained today due to virtual visit.    Physical Exam   GENERAL: Healthy, alert and no distress  EYES: Eyes grossly normal to inspection.  No discharge or erythema, or obvious scleral/conjunctival abnormalities.  RESP: No audible wheeze, cough, or visible cyanosis.  No visible retractions or increased work of breathing.    SKIN: Visible skin clear. No significant rash, abnormal pigmentation or lesions.  NEURO: Cranial nerves grossly intact.  Mentation and speech appropriate for age.  PSYCH: Mentation appears normal, affect normal/bright, judgement and insight intact, normal speech and appearance well-groomed.          Video-Visit Details    Type of service:  Video Visit    Video End Time:7:33 AM    Originating Location (pt. Location): Home    Distant Location (provider location):  [facility]     Platform used for Video Visit: [employer]

## 2021-05-17 ENCOUNTER — VIRTUAL VISIT (OUTPATIENT)
Dept: FAMILY MEDICINE | Facility: CLINIC | Age: 56
End: 2021-05-17
Payer: COMMERCIAL

## 2021-05-17 DIAGNOSIS — F32.89 MENOPAUSAL DEPRESSION: Primary | ICD-10-CM

## 2021-05-17 PROCEDURE — 99213 OFFICE O/P EST LOW 20 MIN: CPT | Mod: GT | Performed by: PHYSICIAN ASSISTANT

## 2021-05-17 RX ORDER — BUPROPION HYDROCHLORIDE 300 MG/1
300 TABLET ORAL EVERY MORNING
Qty: 90 TABLET | Refills: 1 | Status: SHIPPED | OUTPATIENT
Start: 2021-05-17 | End: 2021-10-27

## 2021-05-17 ASSESSMENT — ANXIETY QUESTIONNAIRES
1. FEELING NERVOUS, ANXIOUS, OR ON EDGE: NOT AT ALL
6. BECOMING EASILY ANNOYED OR IRRITABLE: NOT AT ALL
5. BEING SO RESTLESS THAT IT IS HARD TO SIT STILL: NOT AT ALL
GAD7 TOTAL SCORE: 0
7. FEELING AFRAID AS IF SOMETHING AWFUL MIGHT HAPPEN: NOT AT ALL
IF YOU CHECKED OFF ANY PROBLEMS ON THIS QUESTIONNAIRE, HOW DIFFICULT HAVE THESE PROBLEMS MADE IT FOR YOU TO DO YOUR WORK, TAKE CARE OF THINGS AT HOME, OR GET ALONG WITH OTHER PEOPLE: NOT DIFFICULT AT ALL
2. NOT BEING ABLE TO STOP OR CONTROL WORRYING: NOT AT ALL
3. WORRYING TOO MUCH ABOUT DIFFERENT THINGS: NOT AT ALL

## 2021-05-17 ASSESSMENT — PATIENT HEALTH QUESTIONNAIRE - PHQ9
5. POOR APPETITE OR OVEREATING: NOT AT ALL
SUM OF ALL RESPONSES TO PHQ QUESTIONS 1-9: 2

## 2021-05-18 ASSESSMENT — ANXIETY QUESTIONNAIRES: GAD7 TOTAL SCORE: 0

## 2021-06-01 NOTE — PROGRESS NOTES
- Please call us if your compression wraps fall more than 1-2 inches below the bend of the knee. Remove the wraps if you experience any shortness of breathe or notice your toes turning blue/purple and then give us a call. Call if they are too painful. Call if they get wet. If it is a weekend and the wraps fall down, are too painful, or get wet take the wraps off and put on another form of compression. Compression such as velcro wraps, compression stockings, short stretch bandages, or tubular compression. Apply one of these until you can be seen in clinic. Please call us if you have any questions [phone].    - Treatment:  Layered Compression Bandaging (2-layer)    What is it?  The layered compression bandaging has a layer of absorbent material that will soak up drainage.     Why we do it.   This is done to treat swelling, wounds, or both.  This will in turn help circulation and healing.    How to care for your bandages.  The wraps need to be kept dry. If  the wraps become wet, remove them and call the clinic to have another wrap applied.    What to expect.  It is common for the wraps to be uncomfortable at the beginning. The first two days are usually the hardest; then they will become more comfortable.       Elevating your legs will help the discomfort. Try to elevate your legs as much as possible.    If rest and elevation does not help your discomfort, call your provider.  If your provider is not available you can remove the wrap and leave a message for further instructions.    - Swelling and Compression Therapy    Swelling in the legs can be caused by many reasons. No matter what the reason, treatment usually includes some type of compression. This may be done with a support sock, dressing, ace wrap, or layered wraps.     It is important to treat the swelling for many reasons. If the swelling is not treated you may develop blisters that can lead to ulcerations. This is caused when extra fluid goes into tissue  Chief Complaint   Patient presents with     Derm Problem     itchy skin and hives since yesterday, took benadryl     Health Maintenance     HM Due: Preventive care/Zoster/Phq2       Christina Guzman is a 53 year old female who presents to the clinic for possible hives.  Symptoms began 1 day ago.  Symptoms include: itching and rash  The patient denies: chest pains, cough, diaphoresis, shortness of breath, throat tightness and wheezing  Possible trigger(s): none known-  She was outdoors a little more yesterday,  Nothing applied to the skin or change in meds or pills  Additional symptoms: malaise  Initial treatment at home included: benadryl with improvement  History of similar allergic reaction: yes  Past history of intubation: no  Past history of hospitalization secondary to allergies: no    Past Medical History:   Diagnosis Date     Chronic idiopathic urticaria x 9/09    IgE 638, Tryptase=11 (min. elevated)     Fructose disorder     Fructose Malabsorbtion     House dust mite allergy 9/7/10 RAST    DM only     Menopausal depression 12/11/2012     Migraines      Patient Active Problem List   Diagnosis     Chronic idiopathic urticaria     House dust mite allergy     CARDIOVASCULAR SCREENING; LDL GOAL LESS THAN 160     Migraine headache     Low back pain     Menopausal depression     Menopausal syndrome     Vitamin D deficiency     GERD (gastroesophageal reflux disease)     Toe pain     Skin tag     Low back pain, unspecified back pain laterality, unspecified chronicity, with sciatica presence unspecified     H/O carotid endarterectomy     TIA (transient ischemic attack)       ALLERGIES:  Clindamycin      Current Outpatient Medications on File Prior to Visit:  aspirin 81 MG chewable tablet Take 1 tablet (81 mg) by mouth daily   atorvastatin (LIPITOR) 40 MG tablet TAKE 1 TABLET BY MOUTH EVERY DAY`   cyclobenzaprine (FLEXERIL) 10 MG tablet Take 0.5-1 tablets (5-10 mg) by mouth 2 times daily as needed for muscle spasms    EPINEPHrine (EPIPEN/ADRENACLICK/OR ANY BX GENERIC EQUIV) 0.3 MG/0.3ML injection 2-pack Inject 0.3 mLs (0.3 mg) into the muscle once as needed for anaphylaxis   hyoscyamine (LEVSIN/SL) 0.125 MG sublingual tablet    omeprazole (PRILOSEC) 40 MG DR capsule Take 1 capsule (40 mg) by mouth daily     No current facility-administered medications on file prior to visit.     Social History     Tobacco Use     Smoking status: Former Smoker     Packs/day: 0.50     Years: 30.00     Pack years: 15.00     Types: Cigarettes     Smokeless tobacco: Never Used   Substance Use Topics     Alcohol use: No     Frequency: Never     Comment: occ       Family History   Problem Relation Age of Onset     Cancer Mother         OVARIAN,  age 46,48     Other Cancer Mother         Ovarian     Respiratory Father      Cancer Father         Lung     Hypertension Father      Obesity Father      Diabetes Maternal Grandmother      Breast Cancer Maternal Grandmother      Diabetes Maternal Grandfather      Diabetes Paternal Grandmother      Diabetes Paternal Grandfather        ROS:  CONSTITUTIONAL:NEGATIVE for fever, chills,   EYES: NEGATIVE for vision changes or irritation  ENT/MOUTH: NEGATIVE for ear, mouth and throat problems  RESP:NEGATIVE for significant cough or SOB  GI: NEGATIVE for nausea, abdominal pain       EXAM:alert and in moderate distress  /80   Pulse 85   Temp 98.5  F (36.9  C) (Oral)   Resp 16   Wt 93.4 kg (206 lb)   SpO2 98%   Breastfeeding? No   BMI 34.28 kg/m      HEENT: PERRLA, EOMI, fundi benign    Ears:  TM's pearly,  No erythema,  No swelling of external auditory canal  Lips with no swelling  Pharynx with no swelling  Tongue with no swelling    RESP: Normal - CTA without rales, rhonchi, or wheezing.  CARDIAC: NORMAL - regular rate and rhythm without murmur.  SKIN:  pink/ red raised  areas of skin  with   swelling  that  is scattered and confluent located on the  Face, neck , fore arms and hands.  Streaking/  causing damage and blocking blood flow to the tissue.     It is important that you wear your compression every day, including days that you will be seen in clinic.     Compression is often the most important part of treating leg wounds. Without controlling the swelling it is often not possible to heal wounds.     Going without compression for even brief periods of time can be damaging to your legs and your health.  Your compression should be put on first thing in the morning. Take the compression off at night only when instructed by your care provider to do so. Sometimes wearing compression 24 hours a day will be recommended.       If you are having difficulty wearing your compression it is important to notify your care provider so that other options may be reviewed.    Thank you for choosing [facility]. Please call us if you have any questions [phone].     excoriation from itching is noted   .  The areas seem to be exposed to the exterior.  No rash on trunk or legs       Assessment/ Plan  Chronic idiopathic urticaria     Encouraged her to try to identify triggers-  - to take antihistamine daily to suppress reactions    Hives     - cetirizine (ZYRTEC) 10 MG tablet; Take 1 tablet (10 mg) by mouth daily  - triamcinolone (KENALOG) 0.1 % external cream; Apply topically 2 times daily  - methylPREDNISolone (MEDROL) 4 MG tablet therapy pack; Take 1 tablet (4 mg) by mouth See Admin Instructions follow package directions  Will only start steroid if not responding adequately to the antihistamine and topical steroid     We discussed that in emergency situations that the non-sedating antihistamines can be taken temporarily at 2-4 x the normal daily dosing until the allergic reaction resolves.    Discussed that from the pattern of her rash,  Seems to be exposed to something-  Perhaps sun sensitivity, or environmental allergens-  Discussed that ingested allergens start with rash on the trunk     Follow-up if worsening or persistent symptoms-  May consider allergy testing in the future to try to identify the allergen that may have caused the reaction.    To ER or call 911 if extreme shortness of breath, throat swelling    Discussed avoidance of triggers    Given patient information on  hives

## 2021-06-12 NOTE — TELEPHONE ENCOUNTER
"Coronavirus (COVID-19) Notification    Caller Name (Patient, parent, daughter/son, grandparent, etc)  Christina    Reason for call  Notify of Positive Coronavirus (COVID-19) lab results, assess symptoms,  review  [employer] recommendations    Lab Result    Lab test:  2019-nCoV rRt-PCR or SARS-CoV-2 PCR    Oropharyngeal AND/OR nasopharyngeal swabs is POSITIVE for 2019-nCoV RNA/SARS-COV-2 PCR (COVID-19 virus)    RN Recommendations/Instructions per [facility] Coronavirus COVID-19 recommendations    Brief introduction script  Introduce self and then review script:  \"I am calling on behalf of [employer].  We were notified that your Coronavirus test (COVID-19) for was POSITIVE for the virus.  I have some information to relay to you but first I wanted to mention that the [place] Dept of Health will be contacting you shortly [it's possible MD already called Patient] to talk to you more about how you are feeling and other people you have had contact with who might now also have the virus.  Also,  [employer] is Partnering with the [facility] for Covid-19 research, you may be contacted directly by research staff.\"    ssessment (Inquire about Patient's current symptoms)   Assessment   Current Symptoms at time of phone call: (if no symptoms, document No symptoms] Fever, cough, body aches, no taste/smell   Symptom onset (if applicable) Onset > 2 weeks Pt has had fever for 2 weeks.  Fever is > 101 degrees and when she takes tylenol it comes down to 99 degrees.  Since fever is so prolonged, writer advised pt to call her PCP clinic.      If at time of call, Patients symptoms hare worsened, the Patient should contact 911 or have someone drive them to Emergency Dept promptly:      If Patient calling 911, inform 911 personal that you have tested positive for the Coronavirus (COVID-19).  Place mask on and await 911 to arrive.    If Emergency Dept, If possible, please have another adult drive you to the Emergency " Dept but you need to wear mask when in contact with other people.      Review information with Patient    Your result was positive. This means you have COVID-19 (coronavirus).  We have sent you a letter that reviews the information that I'll be reviewing with you now.    How can I protect others?    If you have symptoms: stay home and away from others (self-isolate) until:    You've had no fever--and no medicine that reduces fever--for 1 full day (24 hours). And      Your other symptoms have gotten better. For example, your cough or breathing has improved. And     At least 10 days have passed since your symptoms started. (If you ve been told by a doctor that you have a weak immune system, wait 20 days.)     If you don't have symptoms: Stay home and away from others (self-isolate) until at least 10 days have passed since your first positive COVID-19 test. (Date test collected).    During this time:    Stay in your own room, including for meals. Use your own bathroom if you can.    Stay away from others in your home. No hugging, kissing or shaking hands. No visitors.     Don't go to work, school or anywhere else.     Clean  high touch  surfaces often (doorknobs, counters, handles, etc.). Use a household cleaning spray or wipes. You'll find a full list on the EPA website at www.epa.gov/pesticide-registration/list-n-disinfectants-use-against-sars-cov-2.     Cover your mouth and nose with a mask, tissue or other face covering to avoid spreading germs.    Wash your hands and face often with soap and water.    Caregivers in these groups are at risk for severe illness due to COVID-19:  o People 65 years and older  o People who live in a nursing home or long-term care facility  o People with chronic disease (lung, heart, cancer, diabetes, kidney, liver, immunologic)  o People who have a weakened immune system, including those who:  - Are in cancer treatment  - Take medicine that weakens the immune system, such as  corticosteroids  - Had a bone marrow or organ transplant  - Have an immune deficiency  - Have poorly controlled HIV or AIDS  - Are obese (body mass index of 40 or higher)  - Smoke regularly    Caregivers should wear gloves while washing dishes, handling laundry and cleaning bedrooms and bathrooms.    Wash and dry laundry with special caution. Don't shake dirty laundry, and use the warmest water setting you can.    If you have a weakened immune system, ask your doctor about other actions you should take.    For more tips, go to www.cdc.gov/coronavirus/2019-ncov/downloads/10Things.pdf.    You should not go back to work until you meet the guidelines above for ending your home isolation. You don't need to be retested for COVID-19 before going back to work--studies show that you won't spread the virus if it's been at least 10 days since your symptoms started (or 20 days, if you have a weak immune system).    Employers: This document serves as formal notice of your employee's medical guidelines for going back to work. They must meet the above guidelines before going back to work in person.    How can I take care of myself?    1. Get lots of rest. Drink extra fluids (unless a doctor has told you not to).    2. Take Tylenol (acetaminophen) for fever or pain. If you have liver or kidney problems, ask your family doctor if it's okay to take Tylenol.     Take either:     650 mg (two 325 mg pills) every 4 to 6 hours, or     1,000 mg (two 500 mg pills) every 8 hours as needed.     Note: Don't take more than 3,000 mg in one day. Acetaminophen is found in many medicines (both prescribed and over-the-counter medicines). Read all labels to be sure you don't take too much.    For children, check the Tylenol bottle for the right dose (based on their age or weight).    3. If you have other health problems (like cancer, heart failure, an organ transplant or severe kidney disease): Call your specialty clinic if you don't feel better in  the next 2 days.    4. Know when to call 911: Emergency warning signs include:    Trouble breathing or shortness of breath    Pain or pressure in the chest that doesn't go away    Feeling confused like you haven't felt before, or not being able to wake up    Bluish-colored lips or face    5. Sign up for [employer]. We know it's scary to hear that you have COVID-19. We want to track your symptoms to make sure you're okay over the next 2 weeks. Please look for an email from [employer]--this is a free, online program that we'll use to keep in touch. To sign up, follow the link in the email. Learn more at [email or web address].    Where can I get more information?    [facility]: [email or web address]    Coronavirus Basics: www.health.[facility].[place]/diseases/coronavirus/basics.html    What to Do If You're Sick: www.cdc.gov/coronavirus/2019-ncov/about/steps-when-sick.html    Ending Home Isolation: www.cdc.gov/coronavirus/2019-ncov/hcp/disposition-in-home-patients.html     Caring for Someone with COVID-19: www.cdc.gov/coronavirus/2019-ncov/if-you-are-sick/care-for-someone.html     [facility] clinical trials (COVID-19 research studies): clinicalaffairs.[facility]/n-clinical-trials     A Positive COVID-19 letter will be sent via [employer] or the Mail.  (Exception, no letters sent to Presurgerical/Preprocedure Patients)    [Name]  Lianet Ardon RN

## 2021-10-18 ENCOUNTER — E-VISIT (OUTPATIENT)
Dept: FAMILY MEDICINE | Facility: CLINIC | Age: 56
End: 2021-10-18
Payer: COMMERCIAL

## 2021-10-18 DIAGNOSIS — G43.809 OTHER MIGRAINE WITHOUT STATUS MIGRAINOSUS, NOT INTRACTABLE: Primary | ICD-10-CM

## 2021-10-18 PROCEDURE — 99207 PR NON-BILLABLE SERV PER CHARTING: CPT | Performed by: PHYSICIAN ASSISTANT

## 2021-10-25 DIAGNOSIS — F32.89 MENOPAUSAL DEPRESSION: ICD-10-CM

## 2021-10-27 RX ORDER — BUPROPION HYDROCHLORIDE 300 MG/1
TABLET ORAL
Qty: 90 TABLET | Refills: 0 | Status: SHIPPED | OUTPATIENT
Start: 2021-10-27 | End: 2022-02-11

## 2021-10-27 NOTE — TELEPHONE ENCOUNTER
Prescription approved per [facility] Refill Protocol.  APPT or video visit  NEEDED FOR FURTHER REFILLS  Madiha Ly RN

## 2021-11-24 ENCOUNTER — NURSE TRIAGE (OUTPATIENT)
Dept: NURSING | Facility: CLINIC | Age: 56
End: 2021-11-24
Payer: COMMERCIAL

## 2021-11-24 ENCOUNTER — OFFICE VISIT (OUTPATIENT)
Dept: URGENT CARE | Facility: URGENT CARE | Age: 56
End: 2021-11-24
Payer: COMMERCIAL

## 2021-11-24 VITALS
BODY MASS INDEX: 34.78 KG/M2 | TEMPERATURE: 97.9 F | OXYGEN SATURATION: 95 % | SYSTOLIC BLOOD PRESSURE: 132 MMHG | DIASTOLIC BLOOD PRESSURE: 80 MMHG | HEART RATE: 99 BPM | WEIGHT: 202.6 LBS

## 2021-11-24 DIAGNOSIS — Z20.822 SUSPECTED 2019 NOVEL CORONAVIRUS INFECTION: ICD-10-CM

## 2021-11-24 DIAGNOSIS — H66.001 ACUTE SUPPURATIVE OTITIS MEDIA OF RIGHT EAR WITHOUT SPONTANEOUS RUPTURE OF TYMPANIC MEMBRANE, RECURRENCE NOT SPECIFIED: Primary | ICD-10-CM

## 2021-11-24 PROCEDURE — U0003 INFECTIOUS AGENT DETECTION BY NUCLEIC ACID (DNA OR RNA); SEVERE ACUTE RESPIRATORY SYNDROME CORONAVIRUS 2 (SARS-COV-2) (CORONAVIRUS DISEASE [COVID-19]), AMPLIFIED PROBE TECHNIQUE, MAKING USE OF HIGH THROUGHPUT TECHNOLOGIES AS DESCRIBED BY CMS-2020-01-R: HCPCS | Performed by: FAMILY MEDICINE

## 2021-11-24 PROCEDURE — U0005 INFEC AGEN DETEC AMPLI PROBE: HCPCS | Performed by: FAMILY MEDICINE

## 2021-11-24 PROCEDURE — 99213 OFFICE O/P EST LOW 20 MIN: CPT | Performed by: FAMILY MEDICINE

## 2021-11-24 NOTE — TELEPHONE ENCOUNTER
"Caller reproting URI symptoms for  1 weeks; cough some nasal congestion, post nasal drip sore throat; Today feels some pain in right ear  and it feels plugged \" like I am underwater\"    Triage deandre anderson   Advised to be sen in [facility] today   Caller understands and will comply   Viry Cain RN  [facility]  ear    Reason for Disposition    Earache lasts > 1 hour    Additional Information    Negative: Ear pain is the main symptom    Negative: Hearing loss (complete or partial) is the main symptom    Negative: Earwax is the main concern    Negative: Has nasal allergies and they are acting up    Protocols used: EAR - CONGESTION-A-OH      "

## 2021-11-24 NOTE — PROGRESS NOTES
Chief complaint: right ear pain    Cough started 6 days now persistent  Twin girls granddaughter negative for covid   Right ear has been hurting    Other symptoms: persistent cough, colds, sinus congestion  Fever: No  Tried over the counter medications without relief  No fevers or chills chest pain or shortness of breath   No rash  Ill-contacts: no known covid exposure   Because of persistent and worsening symptoms came in to be seen    Problem list and histories reviewed & adjusted, as indicated.  Additional history: as documented    Problem list, Medication list, Allergies, and Medical/Social/Surgical histories reviewed in EPIC and updated as appropriate.    ROS:  Constitutional, HEENT, cardiovascular, pulmonary, gi and gu systems are negative, except as otherwise noted.    OBJECTIVE:                                                    /80   Pulse 99   Temp 97.9  F (36.6  C) (Oral)   Wt 91.9 kg (202 lb 9.6 oz)   LMP 05/25/2018 (Exact Date)   SpO2 95%   BMI 34.78 kg/m    Body mass index is 34.78 kg/m .  GENERAL: healthy, alert and no distress  EYES: pink palpebral conjunctiva, anicteric sclera, pupils equally reactive to light and accomodation, extraocular muscles intact full and equal.  ENT: midline nasal septum, positive  nasal congestion   Left ear:no tragal tenderness, no mastoid tenderness normal tympaninc membrane   Right ear: no tragal tenderness, no mastoid tenderness normal, dull and erythematous tympaninc membrane   NECK: no adenopathy, no asymmetry or  masses  RESP: lungs clear to auscultation - no rales, rhonchi or wheezes  CV: regular rate and rhythm, normal S1 S2, no S3 or S4, no murmur, click or rub, no peripheral edema and peripheral pulses strong  MS: no gross musculoskeletal defects noted, no edema  NEURO: Normal strength and tone, mentation intact and speech normal    Diagnostic Test Results:  No results found for this or any previous visit (from the past 24 hour(s)).      ASSESSMENT/PLAN:                                                        ICD-10-CM    1. Acute suppurative otitis media of right ear without spontaneous rupture of tympanic membrane, recurrence not specified  H66.001 amoxicillin-clavulanate (AUGMENTIN) 875-125 MG tablet   2. Suspected 2019 novel coronavirus infection  Z20.822 Symptomatic COVID-19 Virus (Coronavirus) by PCR Nose     Prescribed with augmentin  Side effects discussed warned about GI side effects  Rule out covid.  Self isolate until test resuls known and until symptoms improve for 24 hours   Recommend follow up with primary care provider if no relief in 3 days, sooner if worse  Adverse reactions of medications discussed.  Over the counter medications discussed.   Aware to come back in if with worsening symptoms or if no relief despite treatment plan  Patient voiced understanding and had no further questions.     MD Elizabeth Yusuf MD  [facility] [place]

## 2021-11-25 LAB — SARS-COV-2 RNA RESP QL NAA+PROBE: NEGATIVE

## 2021-12-01 ENCOUNTER — NURSE TRIAGE (OUTPATIENT)
Dept: NURSING | Facility: CLINIC | Age: 56
End: 2021-12-01
Payer: COMMERCIAL

## 2021-12-01 NOTE — TELEPHONE ENCOUNTER
"Patient calling reporting left upper abdominal pain starting around 11/26 or 11/27 initially \"cramping.\"   Pain now localized to left upper side.   Consistent pain that increases with deep breath, turning to left side. Rating pain level with movement or deep breath \"8\". No improvement with Tylenol or Ibuprofen.  Afebrile.  Patient has been on liquid diet since Monday 11/29/21 with no improvement.  Stating bowel movements are normal.     Disposition ED. Patient will go to [facility] ED now.    Comfort Bridges RN  [facility]        COVID 19 Nurse Triage Plan/Patient Instructions    Please be aware that novel coronavirus (COVID-19) may be circulating in the community. If you develop symptoms such as fever, cough, or SOB or if you have concerns about the presence of another infection including coronavirus (COVID-19), please contact your health care provider or visit [email or web address].     Disposition/Instructions    ED Visit recommended. Follow protocol based instructions.     Bring Your Own Device:  Please also bring your smart device(s) (smart phones, tablets, laptops) and their charging cables for your personal use and to communicate with your care team during your visit.    Thank you for taking steps to prevent the spread of this virus.  o Limit your contact with others.  o Wear a simple mask to cover your cough.  o Wash your hands well and often.    Resources    [facility]: About COVID-19: [email or web address]    CDC: What to Do If You're Sick: www.cdc.gov/coronavirus/2019-ncov/about/steps-when-sick.html    CDC: Ending Home Isolation: www.cdc.gov/coronavirus/2019-ncov/hcp/disposition-in-home-patients.html     CDC: Caring for Someone: www.cdc.gov/coronavirus/2019-ncov/if-you-are-sick/care-for-someone.html     [facility]: Interim Guidance for Hospital Discharge to Home: www.health.[facility].[place]/diseases/coronavirus/hcp/hospdischarge.pdf    [facility] clinical trials (COVID-19 " research studies): [email or web address].[facility]-clinical-trials     Below are the COVID-19 hotlines at the [place] Department of Health ([facility]). Interpreters are available.   o For health questions: Call 484-633-2424 or 1-501.671.8407 (7 a.m. to 7 p.m.)  o For questions about schools and childcare: Call 787-091-5768 or 1-266.143.2889 (7 a.m. to 7 p.m.)                     Reason for Disposition    Pain lasting > 10 minutes and over 50 years old    Additional Information    Negative: Passed out (i.e., fainted, collapsed and was not responding)    Negative: Shock suspected (e.g., cold/pale/clammy skin, too weak to stand, low BP, rapid pulse)    Negative: Visible sweat on face or sweat is dripping down    Negative: Chest pain    Negative: SEVERE abdominal pain (e.g., excruciating)    Protocols used: ABDOMINAL PAIN - UPPER-A-OH

## 2021-12-02 ENCOUNTER — ANCILLARY PROCEDURE (OUTPATIENT)
Dept: MAMMOGRAPHY | Facility: CLINIC | Age: 56
End: 2021-12-02
Attending: PHYSICIAN ASSISTANT
Payer: COMMERCIAL

## 2021-12-02 DIAGNOSIS — Z12.31 ENCOUNTER FOR SCREENING MAMMOGRAM FOR BREAST CANCER: ICD-10-CM

## 2021-12-02 PROCEDURE — 77067 SCR MAMMO BI INCL CAD: CPT | Mod: TC | Performed by: RADIOLOGY

## 2021-12-02 PROCEDURE — 77063 BREAST TOMOSYNTHESIS BI: CPT | Mod: TC | Performed by: RADIOLOGY

## 2021-12-06 ENCOUNTER — E-VISIT (OUTPATIENT)
Dept: FAMILY MEDICINE | Facility: CLINIC | Age: 56
End: 2021-12-06
Payer: COMMERCIAL

## 2021-12-06 DIAGNOSIS — H92.09 OTALGIA, UNSPECIFIED LATERALITY: Primary | ICD-10-CM

## 2021-12-06 PROCEDURE — 99422 OL DIG E/M SVC 11-20 MIN: CPT | Performed by: PHYSICIAN ASSISTANT

## 2022-02-08 NOTE — PROGRESS NOTES
Mirian is a 56 year old who is being evaluated via a billable video visit.      How would you like to obtain your AVS? MyChart  If the video visit is dropped, the invitation should be resent by: Text to cell phone: [phone]  Will anyone else be joining your video visit? No    Video Start Time: 9:18 AM    Assessment & Plan     Mood disorder (H)  Doing well but having side effects, ok to decrease dose  F/u if symptoms worsen with this  Continue therapy  - buPROPion (WELLBUTRIN XL) 150 MG 24 hr tablet; Take 1 tablet (150 mg) by mouth every morning Note decrease in dose    High cholesterol  Need labs  See below  - Lipid panel reflex to direct LDL Fasting; Future  - Comprehensive metabolic panel (BMP + Alb, Alk Phos, ALT, AST, Total. Bili, TP); Future    Gastroesophageal reflux disease without esophagitis  stable  - omeprazole (PRILOSEC) 40 MG DR capsule; Take 1 capsule (40 mg) by mouth daily    Hyperlipidemia LDL goal <130    - Lipid panel reflex to direct LDL Fasting; Future  - atorvastatin (LIPITOR) 40 MG tablet; Schedule fasting bloodwork. TAKE 1 TABLET DAILY    Chronic left-sided low back pain, unspecified whether sciatica present  stable  - cyclobenzaprine (FLEXERIL) 10 MG tablet; Take 0.5-1 tablets (5-10 mg) by mouth 2 times daily as needed for muscle spasms    Lichen sclerosus  stable  - clobetasol (TEMOVATE) 0.05 % external ointment; Apply topically 2 times daily    Vaginal atrophy  improved  -   Patient Instructions   Please return fasting for cholesterol and diabetes screening, you can make a lab only appointment for this.  No food or drink other than water for 10 hours.          Return in about 1 week (around 2/18/2022) for Lab Work.    Patti Liang PA-C  [facility]   Mirian is a 56 year old who presents for the following health issues  accompanied by her Self.    HPI     Hyperlipidemia Follow-Up      Are you regularly taking any medication or supplement to  lower your cholesterol?   Yes- Atorvastatin    Are you having muscle aches or other side effects that you think could be caused by your cholesterol lowering medication?  No    Due for labs. Is taking. Will come in for this.     Depression and Anxiety Follow-Up    How are you doing with your depression since your last visit? Stable    How are you doing with your anxiety since your last visit?  Stable    Are you having other symptoms that might be associated with depression or anxiety? Yes:  with the higher dose, having constipation issue. Would like to lower the dosage if she can.    Have you had a significant life event? No     Do you have any concerns with your use of alcohol or other drugs? No    Would like to lower dose due to constipation. Is liking the medication but not at the higher dose. doesnt notice much of a change in mood with higher dose.   Starting counseling a few weeks ago for this.   Denies suicidal or homicidal thoughts.  Patient instructed to go to the emergency room or call 911 if these occur.      Flexeril-helps well. Uses prn back pain/spasms. Doesn't need often. No side effects.     Omeprazole helps heartburn. No dark or bloody stools.     Estradiol cream-helps with vaginal atrophy., no side effects.       From obgyn uses clobestol for lichen sclerosis.           Social History     Tobacco Use     Smoking status: Former Smoker     Packs/day: 0.50     Years: 30.00     Pack years: 15.00     Types: Cigarettes     Smokeless tobacco: Never Used   Substance Use Topics     Alcohol use: No     Comment: occ     Drug use: No     PHQ 4/19/2021 5/17/2021   PHQ-9 Total Score 17 2   Q9: Thoughts of better off dead/self-harm past 2 weeks Not at all Not at all     CAROLINE-7 SCORE 12/11/2012 4/19/2021 5/17/2021   Total Score 8 - -   Total Score - 1 0       Suicide Assessment Five-step Evaluation and Treatment (SAFE-T)        Review of Systems   Constitutional, HEENT, cardiovascular, pulmonary, GI, ,  musculoskeletal, neuro, skin, endocrine and psych systems are negative, except as otherwise noted.      Objective           Vitals:  No vitals were obtained today due to virtual visit.    Physical Exam   GENERAL: Healthy, alert and no distress  EYES: Eyes grossly normal to inspection.  No discharge or erythema, or obvious scleral/conjunctival abnormalities.  RESP: No audible wheeze, cough, or visible cyanosis.  No visible retractions or increased work of breathing.    SKIN: Visible skin clear. No significant rash, abnormal pigmentation or lesions.  NEURO: Cranial nerves grossly intact.  Mentation and speech appropriate for age.  PSYCH: Mentation appears normal, affect normal/bright, judgement and insight intact, normal speech and appearance well-groomed.          Video-Visit Details    Type of service:  Video Visit    Video End Time:9:29 AM    Originating Location (pt. Location): Home    Distant Location (provider location):  [facility]     Platform used for Video Visit: [employer]

## 2022-02-11 ENCOUNTER — VIRTUAL VISIT (OUTPATIENT)
Dept: FAMILY MEDICINE | Facility: CLINIC | Age: 57
End: 2022-02-11
Payer: COMMERCIAL

## 2022-02-11 DIAGNOSIS — G89.29 CHRONIC LEFT-SIDED LOW BACK PAIN, UNSPECIFIED WHETHER SCIATICA PRESENT: ICD-10-CM

## 2022-02-11 DIAGNOSIS — F39 MOOD DISORDER (H): Primary | ICD-10-CM

## 2022-02-11 DIAGNOSIS — K21.9 GASTROESOPHAGEAL REFLUX DISEASE WITHOUT ESOPHAGITIS: ICD-10-CM

## 2022-02-11 DIAGNOSIS — E78.00 HIGH CHOLESTEROL: ICD-10-CM

## 2022-02-11 DIAGNOSIS — E78.5 HYPERLIPIDEMIA LDL GOAL <130: ICD-10-CM

## 2022-02-11 DIAGNOSIS — L90.0 LICHEN SCLEROSUS: ICD-10-CM

## 2022-02-11 DIAGNOSIS — N95.2 VAGINAL ATROPHY: ICD-10-CM

## 2022-02-11 DIAGNOSIS — M54.50 CHRONIC LEFT-SIDED LOW BACK PAIN, UNSPECIFIED WHETHER SCIATICA PRESENT: ICD-10-CM

## 2022-02-11 PROBLEM — Z71.89 ADVANCED DIRECTIVES, COUNSELING/DISCUSSION: Status: ACTIVE | Noted: 2022-02-11

## 2022-02-11 PROCEDURE — 99214 OFFICE O/P EST MOD 30 MIN: CPT | Mod: GT | Performed by: PHYSICIAN ASSISTANT

## 2022-02-11 RX ORDER — ATORVASTATIN CALCIUM 40 MG/1
TABLET, FILM COATED ORAL
Qty: 90 TABLET | Refills: 3 | Status: SHIPPED | OUTPATIENT
Start: 2022-02-11 | End: 2022-03-23

## 2022-02-11 RX ORDER — CYCLOBENZAPRINE HCL 10 MG
5-10 TABLET ORAL 2 TIMES DAILY PRN
Qty: 90 TABLET | Refills: 0 | Status: SHIPPED | OUTPATIENT
Start: 2022-02-11 | End: 2022-09-22

## 2022-02-11 RX ORDER — OMEPRAZOLE 40 MG/1
40 CAPSULE, DELAYED RELEASE ORAL DAILY
Qty: 90 CAPSULE | Refills: 3 | Status: SHIPPED | OUTPATIENT
Start: 2022-02-11 | End: 2022-04-21

## 2022-02-11 RX ORDER — CLOBETASOL PROPIONATE 0.5 MG/G
OINTMENT TOPICAL 2 TIMES DAILY
COMMUNITY
Start: 2022-02-11

## 2022-02-11 RX ORDER — ESTRADIOL 0.1 MG/G
CREAM VAGINAL
Qty: 42.5 G | Refills: 11 | Status: SHIPPED | OUTPATIENT
Start: 2022-02-11 | End: 2022-03-23

## 2022-02-11 RX ORDER — BUPROPION HYDROCHLORIDE 150 MG/1
150 TABLET ORAL EVERY MORNING
Qty: 30 TABLET | Refills: 5 | Status: SHIPPED | OUTPATIENT
Start: 2022-02-11 | End: 2022-04-27

## 2022-02-11 ASSESSMENT — ANXIETY QUESTIONNAIRES
GAD7 TOTAL SCORE: 2
1. FEELING NERVOUS, ANXIOUS, OR ON EDGE: NOT AT ALL
6. BECOMING EASILY ANNOYED OR IRRITABLE: SEVERAL DAYS
7. FEELING AFRAID AS IF SOMETHING AWFUL MIGHT HAPPEN: SEVERAL DAYS
IF YOU CHECKED OFF ANY PROBLEMS ON THIS QUESTIONNAIRE, HOW DIFFICULT HAVE THESE PROBLEMS MADE IT FOR YOU TO DO YOUR WORK, TAKE CARE OF THINGS AT HOME, OR GET ALONG WITH OTHER PEOPLE: SOMEWHAT DIFFICULT
5. BEING SO RESTLESS THAT IT IS HARD TO SIT STILL: NOT AT ALL
2. NOT BEING ABLE TO STOP OR CONTROL WORRYING: NOT AT ALL
3. WORRYING TOO MUCH ABOUT DIFFERENT THINGS: NOT AT ALL

## 2022-02-11 ASSESSMENT — PATIENT HEALTH QUESTIONNAIRE - PHQ9
5. POOR APPETITE OR OVEREATING: NOT AT ALL
SUM OF ALL RESPONSES TO PHQ QUESTIONS 1-9: 8

## 2022-02-11 NOTE — PATIENT INSTRUCTIONS
Please return fasting for cholesterol and diabetes screening, you can make a lab only appointment for this.  No food or drink other than water for 10 hours.

## 2022-02-12 ASSESSMENT — ANXIETY QUESTIONNAIRES: GAD7 TOTAL SCORE: 2

## 2022-02-13 ENCOUNTER — HEALTH MAINTENANCE LETTER (OUTPATIENT)
Age: 57
End: 2022-02-13

## 2022-03-17 ENCOUNTER — LAB (OUTPATIENT)
Dept: LAB | Facility: CLINIC | Age: 57
End: 2022-03-17
Payer: COMMERCIAL

## 2022-03-17 ENCOUNTER — MYC MEDICAL ADVICE (OUTPATIENT)
Dept: FAMILY MEDICINE | Facility: CLINIC | Age: 57
End: 2022-03-17

## 2022-03-17 DIAGNOSIS — R53.83 OTHER FATIGUE: Primary | ICD-10-CM

## 2022-03-17 DIAGNOSIS — E78.00 HIGH CHOLESTEROL: ICD-10-CM

## 2022-03-17 DIAGNOSIS — R53.83 OTHER FATIGUE: ICD-10-CM

## 2022-03-17 DIAGNOSIS — E78.5 HYPERLIPIDEMIA LDL GOAL <130: ICD-10-CM

## 2022-03-17 LAB
ALBUMIN SERPL-MCNC: 3.9 G/DL (ref 3.4–5)
ALP SERPL-CCNC: 103 U/L (ref 40–150)
ALT SERPL W P-5'-P-CCNC: 30 U/L (ref 0–50)
ANION GAP SERPL CALCULATED.3IONS-SCNC: 6 MMOL/L (ref 3–14)
AST SERPL W P-5'-P-CCNC: 18 U/L (ref 0–45)
BILIRUB SERPL-MCNC: 1 MG/DL (ref 0.2–1.3)
BUN SERPL-MCNC: 15 MG/DL (ref 7–30)
CALCIUM SERPL-MCNC: 9 MG/DL (ref 8.5–10.1)
CHLORIDE BLD-SCNC: 103 MMOL/L (ref 94–109)
CHOLEST SERPL-MCNC: 195 MG/DL
CO2 SERPL-SCNC: 27 MMOL/L (ref 20–32)
CREAT SERPL-MCNC: 0.64 MG/DL (ref 0.52–1.04)
FASTING STATUS PATIENT QL REPORTED: YES
GFR SERPL CREATININE-BSD FRML MDRD: >90 ML/MIN/1.73M2
GLUCOSE BLD-MCNC: 86 MG/DL (ref 70–99)
HDLC SERPL-MCNC: 49 MG/DL
HOLD SPECIMEN: NORMAL
LDLC SERPL CALC-MCNC: 128 MG/DL
NONHDLC SERPL-MCNC: 146 MG/DL
POTASSIUM BLD-SCNC: 3.8 MMOL/L (ref 3.4–5.3)
PROT SERPL-MCNC: 7.9 G/DL (ref 6.8–8.8)
SODIUM SERPL-SCNC: 136 MMOL/L (ref 133–144)
TRIGL SERPL-MCNC: 90 MG/DL

## 2022-03-17 PROCEDURE — 36415 COLL VENOUS BLD VENIPUNCTURE: CPT

## 2022-03-17 PROCEDURE — 82306 VITAMIN D 25 HYDROXY: CPT

## 2022-03-17 PROCEDURE — 80061 LIPID PANEL: CPT

## 2022-03-17 PROCEDURE — 80053 COMPREHEN METABOLIC PANEL: CPT

## 2022-03-18 NOTE — RESULT ENCOUNTER NOTE
John Vasquez,       Your recent test results are attached, if you have any questions or concerns please feel free to contact me via e-mail or call [phone]. Cholesterol worsened some but better than previously.  Sodium and potassium normal. Blood sugar (glucose) normal.  Creatinine and GFR normal, which means kidney function is normal.            It was a pleasure to see you at your recent office visit.      Sincerely,  Patti Liang

## 2022-03-21 LAB — DEPRECATED CALCIDIOL+CALCIFEROL SERPL-MC: 19 UG/L (ref 20–75)

## 2022-03-21 NOTE — RESULT ENCOUNTER NOTE
John Vasquez,       Your recent test results are attached, if you have any questions or concerns please feel free to contact me via e-mail or call [phone].  Your vitamin D has improved but is low. How much are you taking over the counter?    Cholesterol worsened some. Have you been taking your statin medication?      Sincerely,  Patti Liang PA-C

## 2022-04-21 DIAGNOSIS — K21.9 GASTROESOPHAGEAL REFLUX DISEASE WITHOUT ESOPHAGITIS: ICD-10-CM

## 2022-04-21 RX ORDER — OMEPRAZOLE 40 MG/1
40 CAPSULE, DELAYED RELEASE ORAL DAILY
Qty: 90 CAPSULE | Refills: 2 | Status: SHIPPED | OUTPATIENT
Start: 2022-04-21 | End: 2023-01-09

## 2022-04-26 DIAGNOSIS — F39 MOOD DISORDER (H): ICD-10-CM

## 2022-04-27 RX ORDER — BUPROPION HYDROCHLORIDE 150 MG/1
150 TABLET ORAL EVERY MORNING
Qty: 90 TABLET | Refills: 0 | Status: SHIPPED | OUTPATIENT
Start: 2022-04-27 | End: 2022-07-20

## 2022-04-27 NOTE — TELEPHONE ENCOUNTER
This is a new pharmacy. Transferring remaining refill of 90 days to the new pharmacy.  Thank you. Autumn Adair R.N.

## 2022-09-19 ASSESSMENT — ENCOUNTER SYMPTOMS
FREQUENCY: 0
EYE PAIN: 0
ABDOMINAL PAIN: 1
JOINT SWELLING: 0
SORE THROAT: 0
DIZZINESS: 0
HEARTBURN: 0
DYSURIA: 0
FEVER: 0
NAUSEA: 0
PALPITATIONS: 0
ARTHRALGIAS: 0
CONSTIPATION: 0
PARESTHESIAS: 0
HEADACHES: 0
MYALGIAS: 1
WEAKNESS: 0
NERVOUS/ANXIOUS: 0
HEMATURIA: 0
HEMATOCHEZIA: 0
BREAST MASS: 0
DIARRHEA: 1
SHORTNESS OF BREATH: 0
CHILLS: 0
COUGH: 0

## 2022-09-22 ENCOUNTER — PATIENT OUTREACH (OUTPATIENT)
Dept: ONCOLOGY | Facility: CLINIC | Age: 57
End: 2022-09-22

## 2022-09-22 ENCOUNTER — OFFICE VISIT (OUTPATIENT)
Dept: FAMILY MEDICINE | Facility: CLINIC | Age: 57
End: 2022-09-22
Payer: COMMERCIAL

## 2022-09-22 VITALS
HEIGHT: 64 IN | OXYGEN SATURATION: 98 % | WEIGHT: 200 LBS | RESPIRATION RATE: 16 BRPM | DIASTOLIC BLOOD PRESSURE: 80 MMHG | TEMPERATURE: 97.5 F | SYSTOLIC BLOOD PRESSURE: 123 MMHG | HEART RATE: 76 BPM | BODY MASS INDEX: 34.15 KG/M2

## 2022-09-22 DIAGNOSIS — Z88.9 HISTORY OF ALLERGIC REACTION: ICD-10-CM

## 2022-09-22 DIAGNOSIS — Z23 NEED FOR PROPHYLACTIC VACCINATION AND INOCULATION AGAINST INFLUENZA: ICD-10-CM

## 2022-09-22 DIAGNOSIS — K57.30 DIVERTICULOSIS OF LARGE INTESTINE WITHOUT HEMORRHAGE: ICD-10-CM

## 2022-09-22 DIAGNOSIS — G89.29 CHRONIC LEFT-SIDED LOW BACK PAIN, UNSPECIFIED WHETHER SCIATICA PRESENT: ICD-10-CM

## 2022-09-22 DIAGNOSIS — Z00.00 ROUTINE GENERAL MEDICAL EXAMINATION AT A HEALTH CARE FACILITY: Primary | ICD-10-CM

## 2022-09-22 DIAGNOSIS — Z13.1 SCREENING FOR DIABETES MELLITUS: ICD-10-CM

## 2022-09-22 DIAGNOSIS — Z11.59 NEED FOR HEPATITIS C SCREENING TEST: ICD-10-CM

## 2022-09-22 DIAGNOSIS — Z98.890 H/O CAROTID ENDARTERECTOMY: ICD-10-CM

## 2022-09-22 DIAGNOSIS — Z80.41 FAMILY HISTORY OF MALIGNANT NEOPLASM OF OVARY: ICD-10-CM

## 2022-09-22 DIAGNOSIS — L90.0 LICHEN SCLEROSUS: ICD-10-CM

## 2022-09-22 DIAGNOSIS — E55.9 VITAMIN D DEFICIENCY: ICD-10-CM

## 2022-09-22 DIAGNOSIS — N89.8 VAGINAL DRYNESS: ICD-10-CM

## 2022-09-22 DIAGNOSIS — Z13.220 SCREENING, LIPID: ICD-10-CM

## 2022-09-22 DIAGNOSIS — M54.50 CHRONIC LEFT-SIDED LOW BACK PAIN, UNSPECIFIED WHETHER SCIATICA PRESENT: ICD-10-CM

## 2022-09-22 PROBLEM — E66.01 MORBID OBESITY (H): Status: RESOLVED | Noted: 2020-10-05 | Resolved: 2022-09-22

## 2022-09-22 LAB
ALBUMIN SERPL-MCNC: 3.7 G/DL (ref 3.4–5)
ALP SERPL-CCNC: 102 U/L (ref 40–150)
ALT SERPL W P-5'-P-CCNC: 28 U/L (ref 0–50)
ANION GAP SERPL CALCULATED.3IONS-SCNC: 3 MMOL/L (ref 3–14)
AST SERPL W P-5'-P-CCNC: 17 U/L (ref 0–45)
BILIRUB SERPL-MCNC: 0.8 MG/DL (ref 0.2–1.3)
BUN SERPL-MCNC: 11 MG/DL (ref 7–30)
CALCIUM SERPL-MCNC: 8.7 MG/DL (ref 8.5–10.1)
CHLORIDE BLD-SCNC: 106 MMOL/L (ref 94–109)
CHOLEST SERPL-MCNC: 177 MG/DL
CO2 SERPL-SCNC: 33 MMOL/L (ref 20–32)
CREAT SERPL-MCNC: 0.96 MG/DL (ref 0.52–1.04)
DEPRECATED CALCIDIOL+CALCIFEROL SERPL-MC: 18 UG/L (ref 20–75)
FASTING STATUS PATIENT QL REPORTED: YES
GFR SERPL CREATININE-BSD FRML MDRD: 69 ML/MIN/1.73M2
GLUCOSE BLD-MCNC: 100 MG/DL (ref 70–99)
HCV AB SERPL QL IA: NONREACTIVE
HDLC SERPL-MCNC: 41 MG/DL
LDLC SERPL CALC-MCNC: 106 MG/DL
NONHDLC SERPL-MCNC: 136 MG/DL
POTASSIUM BLD-SCNC: 3.5 MMOL/L (ref 3.4–5.3)
PROT SERPL-MCNC: 7.1 G/DL (ref 6.8–8.8)
SODIUM SERPL-SCNC: 142 MMOL/L (ref 133–144)
TRIGL SERPL-MCNC: 148 MG/DL

## 2022-09-22 PROCEDURE — 80061 LIPID PANEL: CPT | Performed by: PHYSICIAN ASSISTANT

## 2022-09-22 PROCEDURE — 90682 RIV4 VACC RECOMBINANT DNA IM: CPT | Performed by: PHYSICIAN ASSISTANT

## 2022-09-22 PROCEDURE — 86803 HEPATITIS C AB TEST: CPT | Performed by: PHYSICIAN ASSISTANT

## 2022-09-22 PROCEDURE — 90471 IMMUNIZATION ADMIN: CPT | Performed by: PHYSICIAN ASSISTANT

## 2022-09-22 PROCEDURE — 80053 COMPREHEN METABOLIC PANEL: CPT | Performed by: PHYSICIAN ASSISTANT

## 2022-09-22 PROCEDURE — 99214 OFFICE O/P EST MOD 30 MIN: CPT | Mod: 25 | Performed by: PHYSICIAN ASSISTANT

## 2022-09-22 PROCEDURE — 99396 PREV VISIT EST AGE 40-64: CPT | Mod: 25 | Performed by: PHYSICIAN ASSISTANT

## 2022-09-22 PROCEDURE — 82306 VITAMIN D 25 HYDROXY: CPT | Performed by: PHYSICIAN ASSISTANT

## 2022-09-22 PROCEDURE — 36415 COLL VENOUS BLD VENIPUNCTURE: CPT | Performed by: PHYSICIAN ASSISTANT

## 2022-09-22 RX ORDER — CYCLOBENZAPRINE HCL 10 MG
5-10 TABLET ORAL 2 TIMES DAILY PRN
Qty: 90 TABLET | Refills: 0 | Status: SHIPPED | OUTPATIENT
Start: 2022-09-22 | End: 2023-11-08

## 2022-09-22 RX ORDER — EPINEPHRINE 0.3 MG/.3ML
0.3 INJECTION SUBCUTANEOUS
Qty: 0.3 ML | Refills: 2 | Status: SHIPPED | OUTPATIENT
Start: 2022-09-22 | End: 2022-09-22

## 2022-09-22 ASSESSMENT — ENCOUNTER SYMPTOMS
CHILLS: 0
PALPITATIONS: 0
FREQUENCY: 0
BREAST MASS: 0
EYE PAIN: 0
FEVER: 0
COUGH: 0
WEAKNESS: 0
PARESTHESIAS: 0
MYALGIAS: 1
ABDOMINAL PAIN: 1
DIARRHEA: 1
NERVOUS/ANXIOUS: 0
SHORTNESS OF BREATH: 0
HEMATURIA: 0
DIZZINESS: 0
SORE THROAT: 0
ARTHRALGIAS: 0
NAUSEA: 0
HEADACHES: 0
JOINT SWELLING: 0
CONSTIPATION: 0
HEARTBURN: 0
DYSURIA: 0
HEMATOCHEZIA: 0

## 2022-09-22 NOTE — PROGRESS NOTES
Writer received referral, reviewed for appropriate plan, and sent to New Patient Scheduling for completion.    Christina Vincent, BSN, RN, PHN, OCN  Hematology/Oncology Nurse Navigator  [facility]  [phone]

## 2022-09-22 NOTE — PROGRESS NOTES
SUBJECTIVE:   CC: Mirian is an 57 year old who presents for preventive health visit.     Patient has been advised of split billing requirements and indicates understanding: Yes     Pt is fasting for labs.  Had mammo last year. Not due for pap.     - Discuss possible allergy towards a med or adhesive?  Had ekg and the stickies from the leads she got a rash/burn on it.   Used KT tape (kinesio) and got really irritated from this.       High cholesterol- on lipitor. No side effects.     Low back pain-flexeril prn. Helps quite a bit for her.     Gerd-on ppi. No blood in stools. Helps symptoms.     Mood- wellbutrin helps a lot, liking lower dose. Will be slowing down on therapy appointment as well. Doing well. Denies suicidal or homicidal thoughts.  Patient instructed to go to the emergency room or call 911 if these occur.    Vaginal dryness and lichen sclerosis-has estrace and clobetol ointment for lichen sclerosis. Sees obgyn.       New concerns-  Pulled left groin 2 weeks ago. Not worse. Will monitor. Let me know if not improving would refer to sports med in 4 weeks.    LLQ off and on. H/o ovarian cancer in mom. Had ct in er no diverticulitis at that time but had constipation.   Not having currently.       Healthy Habits:     Getting at least 3 servings of Calcium per day:  NO    Bi-annual eye exam:  Yes    Dental care twice a year:  Yes    Sleep apnea or symptoms of sleep apnea:  Daytime drowsiness    Diet:  Regular (no restrictions)    Frequency of exercise:  2-3 days/week    Duration of exercise:  15-30 minutes    Taking medications regularly:  Yes    Medication side effects:  None    PHQ-2 Total Score: 0    Additional concerns today:  Yes        Today's PHQ-2 Score:   PHQ-2 ( 1999 Pf[employer]) 9/19/2022   Q1: Little interest or pleasure in doing things 0   Q2: Feeling down, depressed or hopeless 0   PHQ-2 Score 0   PHQ-2 Total Score (12-17 Years)- Positive if 3 or more points; Administer PHQ-A if positive -   Q1:  Little interest or pleasure in doing things Not at all   Q2: Feeling down, depressed or hopeless Not at all   PHQ-2 Score 0       Abuse: Current or Past (Physical, Sexual or Emotional) - No  Do you feel safe in your environment? Yes        Social History     Tobacco Use     Smoking status: Former Smoker     Packs/day: 0.50     Years: 30.00     Pack years: 15.00     Types: Cigarettes     Smokeless tobacco: Never Used   Substance Use Topics     Alcohol use: No     Comment: occ     If you drink alcohol do you typically have >3 drinks per day or >7 drinks per week? No    No flowsheet data found.    Reviewed orders with patient.  Reviewed health maintenance and updated orders accordingly - Yes    Breast Cancer Screening:    FHS-7:   Breast CA Risk Assessment (FHS-7) 12/2/2021 9/19/2022   Did any of your first-degree relatives have breast or ovarian cancer? Yes Yes   Did any of your relatives have bilateral breast cancer? No No   Did any man in your family have breast cancer? No No   Did any woman in your family have breast and ovarian cancer? No No   Did any woman in your family have breast cancer before age 50 y? No No   Do you have 2 or more relatives with breast and/or ovarian cancer? No Yes   Do you have 2 or more relatives with breast and/or bowel cancer? No No     MOM had ovarian cancer. '      Pertinent mammograms are reviewed under the imaging tab.    History of abnormal Pap smear: NO - age 30-65 PAP every 5 years with negative HPV co-testing recommended  PAP / HPV Latest Ref Rng & Units 10/5/2020 4/4/2017 4/30/2013   PAP (Historical) - NIL NIL NIL   HPV16 NEG:Negative Negative Negative -   HPV18 NEG:Negative Negative Negative -   HRHPV NEG:Negative Negative Negative -     Reviewed and updated as needed this visit by clinical staff   Tobacco  Allergies  Meds   Med Hx  Surg Hx  Fam Hx  Soc Hx          Reviewed and updated as needed this visit by Provider                   Past Medical History:   Diagnosis  "Date     Chronic idiopathic urticaria x     IgE 638, Tryptase=11 (min. elevated)     Fructose disorder     Fructose Malabsorbtion     House dust mite allergy 9/7/10 RAST    DM only     Menopausal depression 2012     Migraines       Past Surgical History:   Procedure Laterality Date     CAROTID ENDARTERECTOMY  2018     ENT SURGERY  2017    Oral surgery     ORTHOPEDIC SURGERY  2014    bone spur shaved from left big toe     TOE SURGERY      Left foot     ZZC ORAL SURGERY PROCEDURE  2017    Cyst removed from abscess in tooth     OB History    Para Term  AB Living   3 2 2 0 1 2   SAB IAB Ectopic Multiple Live Births   1 0 0 0 2      # Outcome Date GA Lbr Jack/2nd Weight Sex Delivery Anes PTL Lv   3 Term  40w0d       RIKA   2 SAB            1 Term  40w0d       RIKA       Review of Systems   Constitutional: Negative for chills and fever.   HENT: Negative for congestion, ear pain, hearing loss and sore throat.    Eyes: Negative for pain and visual disturbance.   Respiratory: Negative for cough and shortness of breath.    Cardiovascular: Negative for chest pain, palpitations and peripheral edema.   Gastrointestinal: Positive for abdominal pain and diarrhea. Negative for constipation, heartburn, hematochezia and nausea.   Breasts:  Negative for tenderness, breast mass and discharge.   Genitourinary: Negative for dysuria, frequency, genital sores, hematuria, pelvic pain, urgency, vaginal bleeding and vaginal discharge.   Musculoskeletal: Positive for myalgias. Negative for arthralgias and joint swelling.   Skin: Negative for rash.   Neurological: Negative for dizziness, weakness, headaches and paresthesias.   Psychiatric/Behavioral: Negative for mood changes. The patient is not nervous/anxious.         OBJECTIVE:   /80   Pulse 76   Temp 97.5  F (36.4  C) (Tympanic)   Resp 16   Ht 1.626 m (5' 4\")   Wt 90.7 kg (200 lb)   LMP 2018 (Exact Date)   SpO2 98%  "  Breastfeeding No   BMI 34.33 kg/m    Physical Exam  GENERAL: alert, no distress and obese  EYES: Eyes grossly normal to inspection, PERRL and conjunctivae and sclerae normal  HENT: ear canals and TM's normal, nose and mouth without ulcers or lesions  NECK: no adenopathy, no asymmetry, masses, or scars and thyroid normal to palpation  RESP: lungs clear to auscultation - no rales, rhonchi or wheezes  CV: regular rate and rhythm, normal S1 S2, no S3 or S4, no murmur, click or rub, no peripheral edema and peripheral pulses strong  ABDOMEN: soft, nontender, no hepatosplenomegaly, no masses and bowel sounds normal  MS: no gross musculoskeletal defects noted, no edema  SKIN: no suspicious lesions or rashes  NEURO: Normal strength and tone, mentation intact and speech normal  PSYCH: mentation appears normal, affect normal/bright        ASSESSMENT/PLAN:   (Z00.00) Routine general medical examination at a health care facility  (primary encounter diagnosis)  Comment:   Plan:     (E55.9) Vitamin D deficiency  Comment:   Plan: Vitamin D Deficiency        I will f/u with labs    (Z68.34) BMI 34.0-34.9,adult  Comment:   Plan: needs to keep working on    (N89.8) Vaginal dryness  Comment:   Plan: continue with obgyn    (Z98.890) H/O carotid endarterectomy  Comment: gets us yearly  Plan: Lipid panel reflex to direct LDL Fasting            (M54.50,  G89.29) Chronic left-sided low back pain, unspecified whether sciatica present  Comment:   Plan: cyclobenzaprine (FLEXERIL) 10 MG tablet        stable    (Z88.9) History of allergic reaction  Comment:   Plan: EPINEPHrine (ANY BX GENERIC EQUIV) 0.3 MG/0.3ML        injection 2-pack            (L90.0) Lichen sclerosus  Comment:   Plan: continue with obgyn    (Z13.1) Screening for diabetes mellitus  Comment:   Plan: Comprehensive metabolic panel (BMP + Alb, Alk         Phos, ALT, AST, Total. Bili, TP)            (Z11.59) Need for hepatitis C screening test  Comment:   Plan: Hepatitis C  "Screen Reflex to HCV RNA Quant and         Genotype            (Z13.220) Screening, lipid  Comment:   Plan: Lipid panel reflex to direct LDL Fasting            (K57.30) Diverticulosis of large intestine without hemorrhage  Comment:   Plan: would get ct in future if acts up again  No pain today. To er with fevers/severe pain. Could be constipation or other    (Z80.41) Family history of malignant neoplasm of ovary  Comment:   Plan: Cancer Risk Mgmt/Cancer Genetic Counseling         Referral        Needs referral for this        Patient has been advised of split billing requirements and indicates understanding: Yes    COUNSELING:  Reviewed preventive health counseling, as reflected in patient instructions       Regular exercise       Healthy diet/nutrition    Estimated body mass index is 34.33 kg/m  as calculated from the following:    Height as of this encounter: 1.626 m (5' 4\").    Weight as of this encounter: 90.7 kg (200 lb).    Weight management plan: Discussed healthy diet and exercise guidelines    She reports that she has quit smoking. Her smoking use included cigarettes. She has a 15.00 pack-year smoking history. She has never used smokeless tobacco.    Billin additional not on preventative   min spent on patient today including chart review, history, exam, and explaining treatment plan and follow-up.     Counseling Resources:  ATP IV Guidelines  Pooled Cohorts Equation Calculator  Breast Cancer Risk Calculator  BRCA-Related Cancer Risk Assessment: FHS-7 Tool  FRAX Risk Assessment  ICSI Preventive Guidelines  Dietary Guidelines for Americans, 2010  USDA's MyPlate  ASA Prophylaxis  Lung CA Screening    JOSE Tadeo [facility]  "

## 2022-09-22 NOTE — PATIENT INSTRUCTIONS
Make an e-visit if needing CT for left lower quadrant pain        Lifestyle recommendations:  Being overweight or obese puts you are risk of major health problems including but not limited to: heart disease/heart attack, stroke, high cholesterol, high blood pressure, and diabetes.  This is why it is important to be at a healthy weight for your height.     Exercise 30 minutes 3-5 times a week, if you can only do 10 minutes 3 times a week that is still shown to have great benefit!  Brisk walking even counts for this.  Consider free youtube videos for exercise that fits your needs and lifestyle.     Monitor your caffeine and soda intake, try to minimize these beverages    Drink plenty of water (about 70-80 ounces a day)    Try to eat a vegetable and fruit  with lunch and dinner.  Have a breakfast that contains protein such as eggs or oatmeal.  Decrease your white bread, pasta, and sweets intake.  Increase lean proteins like chicken or pork. Try to eat out 1-2 times a week or less.  Monitor your portion sizes, try using smaller plates if needed.  Eat slowly, this gives you time to be aware that your body is full.   Let me know at any time if you would like a referral to a nutritionist!      Preventive Health Recommendations  Female Ages 50 - 64    Yearly exam: See your health care provider every year in order to  Review health changes.   Discuss preventive care.    Review your medicines if your doctor has prescribed any.    Get a Pap test every three years (unless you have an abnormal result and your provider advises testing more often).  If you get Pap tests with HPV test, you only need to test every 5 years, unless you have an abnormal result.   You do not need a Pap test if your uterus was removed (hysterectomy) and you have not had cancer.  You should be tested each year for STDs (sexually transmitted diseases) if you're at risk.   Have a mammogram every 1 to 2 years.  Have a colonoscopy at age 50, or have a yearly  FIT test (stool test). These exams screen for colon cancer.    Have a cholesterol test every 5 years, or more often if advised.  Have a diabetes test (fasting glucose) every three years. If you are at risk for diabetes, you should have this test more often.   If you are at risk for osteoporosis (brittle bone disease), think about having a bone density scan (DEXA).    Shots: Get a flu shot each year. Get a tetanus shot every 10 years.    Nutrition:   Eat at least 5 servings of fruits and vegetables each day.  Eat whole-grain bread, whole-wheat pasta and brown rice instead of white grains and rice.  Get adequate Calcium and Vitamin D.     Lifestyle  Exercise at least 150 minutes a week (30 minutes a day, 5 days a week). This will help you control your weight and prevent disease.  Limit alcohol to one drink per day.  No smoking.   Wear sunscreen to prevent skin cancer.   See your dentist every six months for an exam and cleaning.  See your eye doctor every 1 to 2 years.

## 2022-09-23 NOTE — RESULT ENCOUNTER NOTE
John Vasquez,       Your recent test results are attached, if you have any questions or concerns please feel free to contact me via e-mail or call [phone].  Negative hepatitis C test.  Vitamin D is low please take 5,000 units d3 over the counter for three months and we can recheck again.  Cholesterol improved.  Sodium and potassium normal. Blood sugar (glucose) borderline normal.  Creatinine and GFR normal, which means kidney function is normal.            It was a pleasure to see you at your recent office visit.      Sincerely,  Patti Liang PA-C

## 2022-11-23 NOTE — PATIENT INSTRUCTIONS
To emergency room with worsening symptoms, chest pain, or if dizziness lasts longer than usual     Spontaneous, unlabored and symmetrical

## 2022-12-12 ENCOUNTER — TELEPHONE (OUTPATIENT)
Dept: ONCOLOGY | Facility: CLINIC | Age: 57
End: 2022-12-12

## 2022-12-12 ENCOUNTER — VIRTUAL VISIT (OUTPATIENT)
Dept: ONCOLOGY | Facility: CLINIC | Age: 57
End: 2022-12-12
Attending: PHYSICIAN ASSISTANT
Payer: COMMERCIAL

## 2022-12-12 DIAGNOSIS — Z80.3 FAMILY HISTORY OF MALIGNANT NEOPLASM OF BREAST: ICD-10-CM

## 2022-12-12 DIAGNOSIS — Z80.41 FAMILY HISTORY OF MALIGNANT NEOPLASM OF OVARY: Primary | ICD-10-CM

## 2022-12-12 PROCEDURE — 96040 HC GENETIC COUNSELING, EACH 30 MINUTES: CPT | Mod: TEL,95 | Performed by: GENETIC COUNSELOR, MS

## 2022-12-12 NOTE — PROGRESS NOTES
2022    Referring Provider: Patti Liang PA-C    Presenting Information:   I met with Christina for her telephone genetic counseling visit, through the Cancer Risk Management Program, to discuss her family history of ovarian and breast cancer. Today we reviewed this history, cancer screening recommendations, and available genetic testing options.    Personal History:  Christina is a 57 year old year old female. She does not have any personal history of cancer. She had her first menstrual period at age 14, her first child at age 22, and completed menopause at age 55. Christina has her ovaries, fallopian tubes and uterus in place, and she has had no ovarian cancer screening to date. She reports that she has not used hormone replacement therapy.      She has annual clinical breast exams and mammograms; her most recent mammogram in 2021 was normal. Christina began having colonoscopies in her 40's. Her most recent colonoscopy in 2018 was normal and follow-up was recommended in 5 years. She does not regularly do any other cancer screening at this time. Christina reported previous tobacco use for 35 years.    Family History: (Please see scanned pedigree for detailed family history information)    Ritika's mother was diagnosed with ovarian cancer at age 46 and  at age 48.    Ritika's maternal grandmother was diagnosed with breast cancer at age 65 and  at age 82.    Ritika's father was diagnosed with and  form lung cancer at age 69. He is reported to have a history of smoking.    A paternal uncle  from an unknown cancer at age 64.    A paternal aunt was diagnosed with breast cancer at age 72.    Ritika's paternal grandmother was diagnosed with lung cancer at age 77 and  at age 85. She is reported to have a history of smoking.    Her maternal and paternal ethnicity is . There is no known Ashkenazi Roman Catholic ancestry on either side of her family. There is no reported  consanguinity.    Discussion:    Christina's family history of ovarian and breast cancer is suggestive of a hereditary cancer syndrome.    We reviewed the features of sporadic, familial, and hereditary cancers. We discussed that mutations in either BRCA1 or BRCA2 could be possible hereditary explanations for her family history of cancer. Mutations in the BRCA1 or BRCA2 gene are known to cause Hereditary Breast and Ovarian Cancer Syndrome (HBOC). HBOC typically presents with multiple family members diagnosed with breast cancer before age 50 and/or ovarian cancer. Other cancer risks associated with HBOC include male breast cancer, prostate cancer, pancreatic cancer, and melanoma.     We discussed the natural history and genetics of hereditary cancer. A detailed handout regarding hereditary cancer, along with the other information we discussed, will be mailed to Christina at the end of our appointment today and can be found in the after visit summary. Topics included: inheritance pattern, cancer risks, cancer screening recommendations, and also risks, benefits and limitations of testing.    Based on her personal and family history, Christina meets current National Comprehensive Cancer Network (NCCN) criteria for genetic testing of high-penetrance ovarian cancer susceptibility genes (including BRCA1, BRCA2, BRIP1, MLH1, MSH2, MSH6, PMS2, EPCAM, PALB2, RAD51C, and RAD51D).    We discussed that there are additional genes that could cause increased risk for ovarian and/or breast cancer. As many of these genes present with overlapping features in a family and accurate cancer risk cannot always be established based upon the pedigree analysis alone, it would be reasonable for Christina to consider panel genetic testing to analyze multiple genes at once.    Genetic testing is available for BRCA1/2 as part of the patient's core panel. This will then be automatically reflexed to [facility] Common Hereditary Cancers panel.   Genetic  testing is available for 47 genes associated with hereditary cancer: Common Hereditary Cancers panel (APC, CAITY, AXIN2, BARD1, BMPR1A, BRCA1, BRCA2, BRIP1, CDH1, CDK4, CDKN2A, CHEK2, CTNNA1, DICER1, EPCAM, GREM1, HOXB13, KIT, MEN1, MLH1, MSH2, MSH3, MSH6, MUTYH, NBN, NF1, NTHL1, PALB2, PDGFRA, PMS2, POLD1, POLE, PTEN, RAD50, RAD51C, RAD51D, SDHA, SDHB, SDHC, SDHD, SMAD4, SMARCA4, STK11, TP53, TSC1, TSC2, and VHL).  We discussed that many of the genes in the Common Hereditary Cancers panel are associated with specific hereditary cancer syndromes and published management guidelines: Hereditary Breast and Ovarian Cancer syndrome (BRCA1, BRCA2), Garcia syndrome (MLH1, MSH2, MSH6, PMS2, EPCAM), Familial Adenomatous Polyposis (APC), Hereditary Diffuse Gastric Cancer (CDH1), Familial Atypical Multiple Mole Melanoma syndrome (CDK4, CDKN2A), Juvenile Polyposis syndrome (BMPR1A, SMAD4), Cowden syndrome (PTEN), Li Fraumeni syndrome (TP53), Peutz-Jeghers syndrome (STK11), MUTYH Associated Polyposis (MUTYH), Tuberous Sclerosis complex (TSC1, TSC2), Neurofibromatosis type 1 (NF1), Multiple Endocrine Neoplasia type 1 (MEN1), Hereditary Paraganglioma and Pheochromocytoma (SDHA, SDHB, SDHC, SDHD), and von Hippel-Lindau (VHL).   The CAITY, AXIN2, BRIP1, CHEK2, GREM1, MSH3, NBN, NTHL1, PALB2, POLD1, POLE, RAD51C, and RAD51D genes are associated with increased cancer risk and have published management guidelines for certain cancers.    The remaining genes (BARD1, CTNNA1, DICER1, HOXB13, KIT, PDGFRA, RAD50, and SMARCA4) are associated with increased cancer risk and may allow us to make medical recommendations when mutations are identified.      Christina would like to submit a blood sample for her genetic testing. She will go to her nearest [facility] at her earliest convenience to get her blood drawn for her genetic testing.     Verbal consent was given over the phone and written on the consent form. Turnaround time is  approximately 4 weeks once the lab receives the sample.    Medical Management: For Christina, we reviewed that the information from genetic testing may determine:    additional cancer screening for which Christina may qualify (i.e. mammogram and breast MRI, more frequent colonoscopies, more frequent dermatologic exams, etc.),    options for risk reducing surgeries Chrisitna could consider (i.e. bilateral mastectomy, surgery to remove her ovaries and/or uterus, etc.),      and targeted chemotherapies if she were to develop certain cancers in the future (i.e. immunotherapy for individuals with Garcia syndrome, PARP inhibitors, etc.).     These recommendations and possible targeted chemotherapies will be discussed in detail once genetic testing is completed.     Plan:  1) Today Christina elected to proceed with BRCA1/2 with automatic reflex to [facility] Common Hereditary Cancers panel.  2) A copy of the consent form and the after visit summary will be mailed to Christina.  3) This information should be available in approximately 4 weeks, once the lab receives the sample.  4) I will call Christina with the results once they become available.    Time spent on the phone: 28 minutes    Vimal Jewell MS, [facility]  Licensed, Certified Genetic Counselor      Mirian is a 57 year old who is being evaluated via a billable telephone visit.     Pt is in [place]     What phone number would you like to be contacted at? [phone]  How would you like to obtain your AVS? MyChart   Distant Location (provider location):  Off-site    Rosa HAMEED

## 2022-12-12 NOTE — LETTER
Cancer Risk Management  Program Locations    [facility]  [facility]  [facility]  [facility]  Mailing Address  Cancer Risk Management Program  [facility]  [address]  [place] [address]    New patient appointments  [phone]  December 12, 2022    Christina Guzman  [address] [facility] [address]    Dear Christina,    It was a pleasure speaking with you on the phone on 12/12/2022. Here is a copy of the progress note from our discussion. If you have any additional questions, please feel free to call.    Referring Provider: Patti Liang PA-C    Presenting Information:   I met with Christina for her telephone genetic counseling visit, through the Cancer Risk Management Program, to discuss her family history of ovarian and breast cancer. Today we reviewed this history, cancer screening recommendations, and available genetic testing options.    Personal History:  Christina is a 57 year old year old female. She does not have any personal history of cancer. She had her first menstrual period at age 14, her first child at age 22, and completed menopause at age 55. Christina has her ovaries, fallopian tubes and uterus in place, and she has had no ovarian cancer screening to date. She reports that she has not used hormone replacement therapy.      She has annual clinical breast exams and mammograms; her most recent mammogram in December 2021 was normal. Christina began having colonoscopies in her 40's. Her most recent colonoscopy in July 2018 was normal and follow-up was recommended in 5 years. She does not regularly do any other cancer screening at this time. Christina reported previous tobacco use for 35 years.    Family History: (Please see scanned pedigree for detailed family history information)    Ritika's mother was diagnosed with ovarian  cancer at age 46 and  at age 48.    Ritika's maternal grandmother was diagnosed with breast cancer at age 65 and  at age 82.    Ritika's father was diagnosed with and  form lung cancer at age 69. He is reported to have a history of smoking.    A paternal uncle  from an unknown cancer at age 64.    A paternal aunt was diagnosed with breast cancer at age 72.    Ritika's paternal grandmother was diagnosed with lung cancer at age 77 and  at age 85. She is reported to have a history of smoking.    Her maternal and paternal ethnicity is . There is no known Ashkenazi Confucianism ancestry on either side of her family. There is no reported consanguinity.    Discussion:    Christina's family history of ovarian and breast cancer is suggestive of a hereditary cancer syndrome.    We reviewed the features of sporadic, familial, and hereditary cancers. We discussed that mutations in either BRCA1 or BRCA2 could be possible hereditary explanations for her family history of cancer. Mutations in the BRCA1 or BRCA2 gene are known to cause Hereditary Breast and Ovarian Cancer Syndrome (HBOC). HBOC typically presents with multiple family members diagnosed with breast cancer before age 50 and/or ovarian cancer. Other cancer risks associated with HBOC include male breast cancer, prostate cancer, pancreatic cancer, and melanoma.     We discussed the natural history and genetics of hereditary cancer. A detailed handout regarding hereditary cancer, along with the other information we discussed, will be mailed to Christina at the end of our appointment today and can be found in the after visit summary. Topics included: inheritance pattern, cancer risks, cancer screening recommendations, and also risks, benefits and limitations of testing.    Based on her personal and family history, Christina meets current National Comprehensive Cancer Network (NCCN) criteria for genetic testing of high-penetrance ovarian cancer  susceptibility genes (including BRCA1, BRCA2, BRIP1, MLH1, MSH2, MSH6, PMS2, EPCAM, PALB2, RAD51C, and RAD51D).    We discussed that there are additional genes that could cause increased risk for ovarian and/or breast cancer. As many of these genes present with overlapping features in a family and accurate cancer risk cannot always be established based upon the pedigree analysis alone, it would be reasonable for Christina to consider panel genetic testing to analyze multiple genes at once.    Genetic testing is available for BRCA1/2 as part of the patient's core panel. This will then be automatically reflexed to [facility]'s Common Hereditary Cancers panel.   Genetic testing is available for 47 genes associated with hereditary cancer: Common Hereditary Cancers panel (APC, CAITY, AXIN2, BARD1, BMPR1A, BRCA1, BRCA2, BRIP1, CDH1, CDK4, CDKN2A, CHEK2, CTNNA1, DICER1, EPCAM, GREM1, HOXB13, KIT, MEN1, MLH1, MSH2, MSH3, MSH6, MUTYH, NBN, NF1, NTHL1, PALB2, PDGFRA, PMS2, POLD1, POLE, PTEN, RAD50, RAD51C, RAD51D, SDHA, SDHB, SDHC, SDHD, SMAD4, SMARCA4, STK11, TP53, TSC1, TSC2, and VHL).  We discussed that many of the genes in the Common Hereditary Cancers panel are associated with specific hereditary cancer syndromes and published management guidelines: Hereditary Breast and Ovarian Cancer syndrome (BRCA1, BRCA2), Garcia syndrome (MLH1, MSH2, MSH6, PMS2, EPCAM), Familial Adenomatous Polyposis (APC), Hereditary Diffuse Gastric Cancer (CDH1), Familial Atypical Multiple Mole Melanoma syndrome (CDK4, CDKN2A), Juvenile Polyposis syndrome (BMPR1A, SMAD4), Cowden syndrome (PTEN), Li Fraumeni syndrome (TP53), Peutz-Jeghers syndrome (STK11), MUTYH Associated Polyposis (MUTYH), Tuberous Sclerosis complex (TSC1, TSC2), Neurofibromatosis type 1 (NF1), Multiple Endocrine Neoplasia type 1 (MEN1), Hereditary Paraganglioma and Pheochromocytoma (SDHA, SDHB, SDHC, SDHD), and von Hippel-Lindau (VHL).   The CAITY, AXIN2, BRIP1, CHEK2, GREM1, MSH3, NBN,  NTHL1, PALB2, POLD1, POLE, RAD51C, and RAD51D genes are associated with increased cancer risk and have published management guidelines for certain cancers.    The remaining genes (BARD1, CTNNA1, DICER1, HOXB13, KIT, PDGFRA, RAD50, and SMARCA4) are associated with increased cancer risk and may allow us to make medical recommendations when mutations are identified.      Christina would like to submit a blood sample for her genetic testing. She will go to her nearest [facility] at her earliest convenience to get her blood drawn for her genetic testing.     Verbal consent was given over the phone and written on the consent form. Turnaround time is approximately 4 weeks once the lab receives the sample.    Medical Management: For Christina, we reviewed that the information from genetic testing may determine:    additional cancer screening for which Christina may qualify (i.e. mammogram and breast MRI, more frequent colonoscopies, more frequent dermatologic exams, etc.),    options for risk reducing surgeries Christina could consider (i.e. bilateral mastectomy, surgery to remove her ovaries and/or uterus, etc.),      and targeted chemotherapies if she were to develop certain cancers in the future (i.e. immunotherapy for individuals with Garcia syndrome, PARP inhibitors, etc.).     These recommendations and possible targeted chemotherapies will be discussed in detail once genetic testing is completed.     Plan:  1) Today Christina elected to proceed with BRCA1/2 with automatic reflex to [facility]'s Common Hereditary Cancers panel.  2) A copy of the consent form and the after visit summary will be mailed to Christina.  3) This information should be available in approximately 4 weeks, once the lab receives the sample.  4) I will call Christina with the results once they become available.    Time spent on the phone: 28 minutes    Vimal Jewell MS, [facility]  Licensed, Certified Genetic Counselor

## 2022-12-19 ENCOUNTER — LAB (OUTPATIENT)
Dept: LAB | Facility: CLINIC | Age: 57
End: 2022-12-19
Payer: COMMERCIAL

## 2022-12-19 DIAGNOSIS — Z80.3 FAMILY HISTORY OF MALIGNANT NEOPLASM OF BREAST: ICD-10-CM

## 2022-12-19 DIAGNOSIS — Z80.41 FAMILY HISTORY OF MALIGNANT NEOPLASM OF OVARY: ICD-10-CM

## 2022-12-19 PROCEDURE — 36415 COLL VENOUS BLD VENIPUNCTURE: CPT

## 2022-12-19 PROCEDURE — 99000 SPECIMEN HANDLING OFFICE-LAB: CPT

## 2022-12-20 DIAGNOSIS — F39 MOOD DISORDER (H): ICD-10-CM

## 2022-12-20 DIAGNOSIS — E78.5 HYPERLIPIDEMIA LDL GOAL <130: ICD-10-CM

## 2022-12-21 RX ORDER — BUPROPION HYDROCHLORIDE 150 MG/1
TABLET ORAL
Qty: 90 TABLET | Refills: 0 | Status: SHIPPED | OUTPATIENT
Start: 2022-12-21 | End: 2023-11-08

## 2022-12-21 RX ORDER — ATORVASTATIN CALCIUM 40 MG/1
TABLET, FILM COATED ORAL
Qty: 90 TABLET | Refills: 0 | Status: SHIPPED | OUTPATIENT
Start: 2022-12-21 | End: 2023-03-13

## 2022-12-29 LAB — SCANNED LAB RESULT: NORMAL

## 2023-01-06 DIAGNOSIS — K21.9 GASTROESOPHAGEAL REFLUX DISEASE WITHOUT ESOPHAGITIS: ICD-10-CM

## 2023-01-09 RX ORDER — OMEPRAZOLE 40 MG/1
CAPSULE, DELAYED RELEASE ORAL
Qty: 90 CAPSULE | Refills: 2 | Status: SHIPPED | OUTPATIENT
Start: 2023-01-09 | End: 2023-10-27

## 2023-01-27 NOTE — PATIENT INSTRUCTIONS
Assessing Cancer Risk  Cancer is a common diagnosis which impacts many families.  Individuals may develop cancer due to environmental factors (such as exposures and lifestyle), aging, genetic predisposition, or a combination of these factors.      Only about 5-10% of cancers are thought to be due to an inherited cancer susceptibility gene.    These families often have:  Several people with the same or related types of cancer  Cancers diagnosed at a young age (before age 50)  Individuals with more than one primary cancer  Multiple generations of the family affected with cancer    Comprehensive Breast and Gynecologic Cancer Panel  We each inherit two copies of every gene in our bodies: one from our mother, and one from our father. Each gene has a specific job to do.  When a gene has a mistake or  mutation  in it, it does not work like it should.     Some people may be candidates for genetic testing of more than one gene.  For these families, genetic testing using a cancer panel may be offered. These panels will test different genes at once known to increase the risk for breast, ovarian, uterine, and/or other cancers.    This handout will review common hereditary breast and gynecologic cancer syndromes. The genes that will be discussed in this handout are: CAITY, BRCA1, BRCA2, BRIP1, CDH1, CHEK2, MLH1, MSH2, MSH6, PMS2, EPCAM, PTEN, PALB2, RAD51C, RAD51D, and TP53.    The purpose of this handout is to serve as a brief summary of the breast and gynecologic cancer risk genes that have published clinical management guidelines for individuals who are found to carry a mutation. Inheriting a mutation does not mean a person will develop cancer, but it does significantly increase their risk above the general population risk.     ______________________________________________________________________________    Hereditary Breast and Ovarian Cancer Syndrome (BRCA1 and BRCA2)  A single mutation in one of the copies of BRCA1 or  BRCA2 increases the risk for breast and ovarian cancer, among others.  The risk for pancreatic cancer and melanoma may also be slightly increased in some families.  The chart below shows the chance that someone with a BRCA mutation would develop cancer in his or her lifetime1,2,3,4.       Lifetime Cancer Risks    General Population BRCA1  BRCA2   Breast  12% >60% >60%   Ovarian  1-2% 39-58% 13-29%   Prostate 12% 7-26% 19-61%   Male Breast 0.1% 0.2-1.2% 1.8-7.1%   Pancreas 1-2% Up to 5% 5-10%     A person s ethnic background is also important to consider, as individuals of Ashkenazi Spiritism ancestry have a higher chance of having a BRCA gene mutation.  There are three BRCA mutations that occur more frequently in this population.      Garcia Syndrome (MLH1, MSH2, MSH6, PMS2, and EPCAM)  Currently five genes are known to cause Garcia Syndrome: MLH1, MSH2, MSH6, PMS2, and EPCAM.  A single mutation in one of the Garcia Syndrome genes increases the risk for colon, endometrial, ovarian, and stomach cancers.  Other cancers that occur less commonly in Garcia Syndrome include urinary tract, skin, and brain cancers.  The chart below shows the chance that a person with Garcia syndrome would develop cancer in his or her lifetime5.      Lifetime Cancer Risks    General Population Garcia Syndrome   Colon 5% 10-61%   Endometrial 3% 13-57%   Ovarian 1-2% 1-38%   Stomach <1% 1-9%   *Cancer risk varies depending on Garcia syndrome gene found      Cowden Syndrome (PTEN)  Cowden syndrome is a hereditary condition that increases the risk for breast, thyroid, endometrial, colon, and kidney cancer.  Cowden syndrome is caused by a mutation in the PTEN gene.  A single mutation in one of the copies of PTEN causes Cowden syndrome and increases cancer risk.  The chart below shows the chance that someone with a PTEN mutation would develop cancer in their lifetime6,7.  Other benign features seen in some individuals with Cowden syndrome include benign  skin lesions (facial papules, keratoses, lipomas), learning disability, autism, thyroid nodules, colon polyps, and larger head size.     Lifetime Cancer Risks    General Population Cowden   Breast 12% 40-60%*   Thyroid 1% Up to 38%   Renal 1-2% Up to 35%   Endometrial 3% Up to 28%   Colon 5% Up to 9%   Melanoma 2-3% Up to 6%   *Emerging data suggests the risk for breast cancer could be greater than 60%               Li-Fraumeni Syndrome (TP53)  Li-Fraumeni Syndrome (LFS) is a cancer predisposition syndrome caused by a mutation in the TP53 gene. A single mutation in one of the copies of TP53 increases the risk for multiple cancers. Individuals with LFS are at an increased risk for developing cancer at a young age. The lifetime risk for development of a LFS-associated cancer is 50% by age 30 and 90% by age 60.   Core Cancers: Sarcomas, Breast, Brain, Lung, Leukemias/Lymphomas, Adrenocortical carcinomas  Other Cancers: Gastrointestinal, Thyroid, Skin, Genitourinary       Hereditary Diffuse Gastric Cancer (CDH1)  Currently, one gene is known to cause hereditary diffuse gastric cancer (HDGC): CDH1.  Individuals with HDGC are at increased risk for diffuse gastric cancer and lobular breast cancer. Of people diagnosed with HDGC, 30-50% have a mutation in the CDH1 gene.  This suggests there are likely other genes that may cause HDGC that have not been identified yet.      Lifetime Cancer Risks    General Population HDGC   Diffuse Gastric  <1% ~80%   Breast 12% 41-60%       Additional Genes    CAITY  CAITY is a moderate-risk breast cancer gene. Women who have a mutation in CAITY can have between a 2-4 fold increased risk for breast cancer compared to the general population8. CAITY mutations have also been associated with increased risk for pancreatic cancer between 5-10%9. Individuals who inherit two CAITY mutations have a condition called ataxia-telangiectasia (AT).  This rare autosomal recessive condition affects the nervous system  and immune system, and is associated with progressive cerebellar ataxia beginning in childhood. Individuals with ataxia-telangiectasia often have a weakened immune system and have an increased risk for childhood cancers.    PALB2  Mutations in PALB2 have been shown to increase the risk of breast cancer up to 41-60% in some families; where individuals fall within this risk range is dependent upon family [id number]. PALB2 mutations have also been associated with increased risk for pancreatic cancer between 5-10%.  Individuals who inherit two PALB2 mutations--one from their mother and one from their father--have a condition called Fanconi Anemia.  This rare autosomal recessive condition is associated with short stature, developmental delay, bone marrow failure, and increased risk for childhood cancers.    CHEK2   CHEK2 is a moderate-risk breast cancer gene.  Women who have a mutation in CHEK2 have around a 2-4 fold increased risk for breast cancer compared to the general population, and this risk may be higher depending upon family history.11,12,13 The risk of colon cancer may be twice as high as the general population risk of colon cancer of 5%. Mutations in CHEK2 have also been shown to increase the risk of other cancers, including prostate, however these cancer risks are currently not well understood.    BRIP1, RAD51C and RAD51D  Mutations in RAD51C and RAD51D have been shown to increase the risk of ovarian cancer and breast cancer 14,. Mutations in BRIP1 have been shown to increase the risk of ovarian cancer and possibly female breast cancer 15 .       Lifetime Cancer Risk    General Population        BRIP1   RAD51C  RAD51D   Breast 12% Not well defined 20-40% 20-40%   Ovarian 1-2% 5-15% 10-15% 10-20%     ______________________________________________________________  Inheritance  All of the cancer syndromes reviewed above are inherited in an autosomal dominant pattern.  This means that if a parent has a mutation,  each of their children will have a 50% chance of inheriting that same mutation. Therefore, each child --male or female-- would have a 50% chance of being at increased risk for developing cancer.    Image obtained from Genetics Home Reference, 2013     Mutations in some genes can occur de rudolph, which means that a person s mutation occurred for the first time in them and was not inherited from a parent.  Now that they have the mutation, however, it can be passed on to future generations.    Genetic Testing  Genetic testing involves a blood test and will look for any harmful mutations that are associated with increased cancer risk.  If possible, it is recommended that the person(s) who has had cancer be tested before other family members.  That person will give us the most useful information about whether or not a specific gene is associated with the cancer in the family.    Results  There are three possible results of genetic testing:  Positive--a harmful mutation was identified in one or more of the genes  Negative--no mutations were identified in any of the genes tested  Variant of unknown significance--a variation in one of the genes was identified, but it is unclear how this impacts cancer risk in the family    Advantages and Disadvantages   There are advantages and disadvantages to genetic testing.    Advantages  May clarify your cancer risk  Can help you make medical decisions  May explain the cancers in your family  May give useful information to your family members (if you share your results)    Disadvantages  Possible negative emotional impact of learning about inherited cancer risk  Uncertainty in interpreting a negative test result in some situations  Possible genetic discrimination concerns (see below)    Genetic Information Nondiscrimination Act (SHAREE)  The Genetic Information Nondiscrimination Act of 2008 (SHAREE) is a federal law that protects individuals from health insurance or employment discrimination  based on a genetic test result alone (with some exceptions, including employers with fewer than 15 employees, and ).  Although rare, SHAREE  does not cover discrimination protections in terms of life insurance, long term care, or disability insurances.  Visit the National Human [employer] Mobile Research Riner website to learn more.    Reducing Cancer Risk  All of the genes described in this handout have nationally recognized cancer screening guidelines that would be recommended for individuals who test positive.  In addition to increased cancer screening, surgeries may be offered or recommended to reduce cancer risk.  Recommendations are based upon an individual s genetic test result as well as their personal and family history of cancer.    Questions to Think About Regarding Genetic Testing:  What effect will the test result have on me and my relationship with my family members if I have an inherited gene mutation?  If I don t have a gene mutation?  Should I share my test results, and how will my family react to this news, which may also affect them?  Are my children ready to learn new information that may one day affect their own health?    Hereditary Cancer Resources    FORCE: Facing Our Risk of Cancer Empowered facingourrisk.org   Bright Pink bebrightpink.org   Li-Fraumeni Syndrome Association lfsassociation.org   PTEN World PTENworld.com   No stomach for cancer, Inc. nostomachforcancer.org   Stomach cancer relief network Scrnet.org   Collaborative Group of the Americas on Inherited Colorectal Cancer (CGA) cgaicc.com    Cancer Care cancercare.org   American Cancer Society (ACS) cancer.org   National Cancer Riner (NCI) cancer.gov     Please call us if you have any questions or concerns.   Cancer Risk Management Program [phone] ([phone])  Vimal Jewell, MS [facility]  [phone]  Irina Middleton, MS, [facility] [phone]  Yoko Cedillo, MS, [facility]  [phone]  Lydia Lacy, MS, [facility]  [phone]  Kajal Larson,  MS, [facility]  [phone]  Tatum Contreras, MS, [facility] [phone]  Africa Son, MS, [facility] [phone]    References  Sara Esposito PDP, Nohemi S, Shoshana RAO, Katie JE, Tico JL, Richardson N, Norma H, Huber O, Rebecca A, Pasini B, Radiniko P, Manjaylin S, Bandar DM, Olivares N, Casey E, Kaveh H, Baker E, Niru J, Groncaridad J, Shahida B, Tulinius H, Thorlacius S, Eerola H, Nevanlinna H, Fauzia K, Bibiana OP. Average risks of breast and ovarian cancer associated with BRCA1 or BRCA2 mutations detected in case series unselected for family history: a combined analysis of 222 studies. Am J Hum Nuha. 2003;72:1117-30.  Puma N, Mee M, Luis Enrique G.  BRCA1 and BRCA2 Hereditary Breast and Ovarian Cancer. Gene Reviews online. 2013.  Kapil YC, Winston S, Rico G, Will S. Breast cancer risk among male BRCA1 and BRCA2 mutation carriers. J Natl Cancer Inst. 2007;99:1811-4.  Andre JETT, Consuelo I, Felix J, Darwin E, Bria ER, Karol F. Risk of breast cancer in male BRCA2 carriers. J Med Nuha. 2010;47:710-1.  National Comprehensive Cancer Network. Clinical practice guidelines in oncology, colorectal cancer screening. Available online (registration required). 2015.  Rian MH, Mateo J, Jovita J, Jose REGALADO, Starla MS, Eng C. Lifetime cancer risks in individuals with germline PTEN mutations. Clin Cancer Res. 2012;18:400-7.  Hood R. Cowden Syndrome: A Critical Review of the Clinical Literature. J Nuha . 2009:18:13-27.  Moses LOPEZ, Marvin LOWE, Raymundo S, Ann P, Radu T, Baljinder M, Shiraz B, Mally H, Cecile R, Cecy K, Kelli L, Andre JETT, Bandar LOWE, Russel DF, Lindsay MR, The Breast Cancer Susceptibility Collaboration (UK) & Khadijah GARCIA. CAITY mutations that cause ataxia-telangiectasia are breast cancer susceptibility alleles. Nature Genetics. 2006;38:873-875  Alirio N , Lisa Y, Alyssa J, Aureliano L, Kishan SLATER , Ralf ML, Jo-Ann S, Cat AG, Emerson S, Mayo ML, Shavon J , Eliecer R, Kendy MAYBERRY, Florencio  JR, Wai VE, Chasidy M, Vosampsonstein B, Jasper N, Radha RH, Irvin KW, and Shazia AP. CAITY mutations in patients with hereditary pancreatic cancer. Cancer Discover. 2012;2:41-46  Guanaco COSME., et al. Breast-Cancer Risk in Families with Mutations in PALB2. NEJM. 2014; 371(6):497-506.  CHEK2 Breast Cancer Case-Control Consortium. CHEK2*1100delC and susceptibility to breast cancer: A collaborative analysis involving 10,860 breast cancer cases and 9,065 controls from 10 studies. Am J Hum Nuha, 74 (2004), pp. 9272-5686  Marilu T, Rosaura S, Juvenal K, et al. Spectrum of Mutations in BRCA1, BRCA2, CHEK2, and TP53 in Families at High Risk of Breast Cancer. NAJMA. 2006;295(12):0289-5836.   Herb C, Elbert D, Dominic LOPEZ, et al. Risk of breast cancer in women with a CHEK2 mutation with and without a family history of breast cancer. J Clin Oncol. 2011;29:9811-6296.  Song H, Kamaljits E, Ramus SJ, et al. Contribution of germline mutations in the RAD51B, RAD51C, and RAD51D genes to ovarian cancer in the population. J Clin Oncol. 2015;33(26):6947-6004. Doi:10.1200/JCO.2015.61.2408.  Patricia T, Renny DF, Jess P, et al. Mutations in BRIP1 confer high risk of ovarian cancer. Judy Nuha. 2011;43(11):5684-1156. doi:10.1038/ng.955.

## 2023-01-29 ENCOUNTER — OFFICE VISIT (OUTPATIENT)
Dept: URGENT CARE | Facility: URGENT CARE | Age: 58
End: 2023-01-29
Payer: COMMERCIAL

## 2023-01-29 VITALS
RESPIRATION RATE: 14 BRPM | DIASTOLIC BLOOD PRESSURE: 81 MMHG | HEART RATE: 94 BPM | SYSTOLIC BLOOD PRESSURE: 125 MMHG | OXYGEN SATURATION: 96 % | TEMPERATURE: 97.7 F | BODY MASS INDEX: 34.84 KG/M2 | WEIGHT: 203 LBS

## 2023-01-29 DIAGNOSIS — J20.9 ACUTE BRONCHITIS WITH SYMPTOMS > 10 DAYS: Primary | ICD-10-CM

## 2023-01-29 PROCEDURE — 99214 OFFICE O/P EST MOD 30 MIN: CPT | Performed by: FAMILY MEDICINE

## 2023-01-29 RX ORDER — AZITHROMYCIN 250 MG/1
TABLET, FILM COATED ORAL
Qty: 6 TABLET | Refills: 0 | Status: SHIPPED | OUTPATIENT
Start: 2023-01-29 | End: 2023-03-02

## 2023-01-29 RX ORDER — ALBUTEROL SULFATE 90 UG/1
2 AEROSOL, METERED RESPIRATORY (INHALATION) EVERY 4 HOURS PRN
Qty: 18 G | Refills: 0 | Status: SHIPPED | OUTPATIENT
Start: 2023-01-29 | End: 2023-11-08

## 2023-01-29 NOTE — PROGRESS NOTES
Chief complaint: cold    2 months ago had a cough congestion worn out headache no fever     Since then has had off and on sinus symptoms and cough  Would get better then worse again    Yesterday started hurting with the coughing     Has done several covid tests negative  Did one yesterday negative     Cough has never completely better   This round started getting worse     No history of asthma as far as she knows   Although hard to breathe at times    Hurts her ribs now from coughing both ribs and her chest with coughing     Chest pain or exertional shortness of breath: NO   Exposure to pertussis or pertussis like symptoms: No  Orthopnea, worsening edema, pnd: NO  Rash: NO  Tried OTC medications without relief  No hemoptysis.  Worsening symptoms hence patient came in to be seen     Problem list and histories reviewed & adjusted, as indicated.  Additional history: as documented    Problem list, Medication list, Allergies, and Medical/Social/Surgical histories reviewed in EPIC and updated as appropriate.    ROS:  Constitutional, HEENT, cardiovascular, pulmonary, gi and gu systems are negative, except as otherwise noted.    OBJECTIVE:                                                    /81   Pulse 94   Temp 97.7  F (36.5  C) (Tympanic)   Resp 14   Wt 92.1 kg (203 lb)   LMP 05/25/2018 (Exact Date)   SpO2 96%   BMI 34.84 kg/m    Body mass index is 34.84 kg/m .  GENERAL: healthy, alert and no distress  EYES: pink palpebral conjunctiva, anicteric sclera  ENT: midline nasal septum normal ear exam. congested sinuses.   Mouth: moist buccal mucosa nonhyperemic posterior pharyngeal wall. No tonsillar enlargement or cellulitis  NECK: no adenopathy, no asymmetry, masses, or scars and thyroid normal to palpation  RESP: lungs clear to auscultation - No  rales, rhonchi or wheezes    CV: regular rate and rhythm, normal S1 S2, no S3 or S4,  No murmurs, click or rub  SKIN: no visible rashes noted  Pscyh: Appropriate mood  and affect  MS: no gross musculoskeletal defects noted    Diagnostic Test Results:  No results found for this or any previous visit (from the past 24 hour(s)).     ASSESSMENT/PLAN:                                                        ICD-10-CM    1. Acute bronchitis with symptoms > 10 days  J20.9 azithromycin (ZITHROMAX) 250 MG tablet     albuterol (PROAIR HFA/PROVENTIL HFA/VENTOLIN HFA) 108 (90 Base) MCG/ACT inhaler          Prescribed with above   Advised that prolonged cough > 3 weeks recommend chest xray, offered today, declined.   Adverse reactions of medications discussed.  Over the counter medications discussed.   Aware to come back in if with worsening symptoms or if no relief despite treatment plan  Patient voiced understanding and had no further questions.     MD Elizabeth Yusuf MD  [facility] [place]

## 2023-01-30 ENCOUNTER — TELEPHONE (OUTPATIENT)
Dept: URGENT CARE | Facility: URGENT CARE | Age: 58
End: 2023-01-30

## 2023-01-30 ENCOUNTER — VIRTUAL VISIT (OUTPATIENT)
Dept: ONCOLOGY | Facility: CLINIC | Age: 58
End: 2023-01-30
Attending: GENETIC COUNSELOR, MS
Payer: COMMERCIAL

## 2023-01-30 DIAGNOSIS — Z80.3 FAMILY HISTORY OF MALIGNANT NEOPLASM OF BREAST: ICD-10-CM

## 2023-01-30 DIAGNOSIS — Z80.41 FAMILY HISTORY OF MALIGNANT NEOPLASM OF OVARY: Primary | ICD-10-CM

## 2023-01-30 PROCEDURE — 999N000069 HC STATISTIC GENETIC COUNSELING, < 16 MIN: Mod: GT,95 | Performed by: GENETIC COUNSELOR, MS

## 2023-01-30 NOTE — Clinical Note
"Hello,    Please enclose a copy of the test report from the laboratory tab titled \"laboratory miscellaneous order\" dated 12/19/22 (Order [id number]) to send to the patient along with the letter.    Referring provider: please see below for summary of genetic test results for your reference.    Thank you,  Vimal"

## 2023-01-30 NOTE — PROGRESS NOTES
"1/30/2023    Referring Provider: Patti Liang PA-C    Presenting Information:  I spoke to Christina by video today to discuss her genetic testing results. Her blood was drawn on 12/19/22. The Common Hereditary Cancers panel was ordered from [employer]. This testing was done because of Christina's family history of breast and ovarian cancer.    Genetic Testing Result: NEGATIVE  Christina is negative for mutations in APC, CAITY, AXIN2, BARD1, BMPR1A, BRCA1, BRCA2, BRIP1, CDH1, CDK4, CDKN2A, CHEK2, CTNNA1, DICER1, EPCAM, GREM1, HOXB13, KIT, MEN1, MLH1, MSH2, MSH3, MSH6, MUTYH, NBN, NF1, NTHL1, PALB2, PDGFRA, PMS2, POLD1, POLE, PTEN, RAD50, RAD51C, RAD51D, SDHA, SDHB, SDHC, SDHD, SMAD4, SMARCA4, STK11, TP53, TSC1, TSC2, and VHL. No mutations were found in any of the 47 genes analyzed. This test involved sequencing and deletion/duplication analysis of all genes with the exceptions of EPCAM and GREM1 (deletions/duplications only) and SDHA (sequencing only).     A copy of the test report can be found in the Laboratory tab, dated 12/19/22, and named \"LABORATORY MISCELLANEOUS ORDER\". The report is scanned in as a linked document.    Interpretation:  We discussed several different interpretations of this negative test result.    1. One explanation may be that there is a different gene or combination of genes and environment that are associated with the cancers in this family.  2. It is possible that her mother did have a mutation in one of these genes and she did not inherit it.  3. There is also a small possibility that there is a mutation in one of these genes, and the testing laboratory could not find it with their current testing methods.       Screening:  Based on this negative test result, it is important for Christina and her relatives to refer back to the family history for appropriate cancer screening.      Due to Christina's family history of ovarian cancer, Christina and other close female relatives remain at slightly increased " risk for ovarian cancer. We discussed available ovarian cancer screening (pelvic exams, CA-125 blood tests, and transvaginal ultrasounds) as well as the significant limitations of this screening. As such, this screening is not typically recommended. That being said, women in this family should discuss this screening and the signs and symptoms of ovarian cancer with their primary OB/GYN provider, as they may have individualized recommendations.    Based on her personal and family history, Christina has a 10.8% lifetime risk of developing breast cancer based on the DENIZ model. Therefore, Christina does not meet current National Comprehensive Cancer Network (NCCN) guidelines for high risk breast screening, which is offered to women with a 20% lifetime risk or higher. However, it is still important for Christina to continue with routine breast screening under the care of her physicians. Breast cancer screening is generally recommended to begin approximately 10 years younger than the earliest age of breast cancer diagnosis in the family, or at age 40, whichever comes first. In this family, screening may begin at age 40. Christina is encouraged to discuss breast screening with her physicians.     Other population cancer screening options, such as those recommended by the American Cancer Society and the National Comprehensive Cancer Network (NCCN), are also appropriate for Christina and her family. These screening recommendations may change if there are changes to Chirstina's personal and/or family history of cancer. Final screening recommendations should be made by each individual's primary care provider.      Inheritance:  We reviewed autosomal dominant inheritance. We discussed that Christina did not pass on an identifiable mutation in these genes to her children based on this test result. Mutations in these genes do not skip generations.      Additional Testing Considerations:  Although Christina's genetic testing result was negative,  other relatives may still carry a gene mutation associated with breast and/or ovarian cancer. Genetic counseling is recommended for her siblings and other close maternal relatives to discuss genetic testing options. If any of these relatives do pursue genetic testing, Christina is encouraged to contact me so that we may review the impact of their test results on her.    Summary:  We do not have an explanation for Christina's family history of cancer. While no genetic changes were identified, Christina may still be at risk for certain cancers due to family history, environmental factors, or other genetic causes not identified by this test. Because of that, it is important that she continue with cancer screening based on her personal and family history as discussed above.    Genetic testing is rapidly advancing, and new cancer susceptibility genes will most likely be identified in the future. Therefore, I encouraged Christina to contact me annually or if there are changes in her personal or family history. This may change how we assess her cancer risk, screening, and the testing we would offer.    Plan:  1.  A copy of the test results will be mailed to Christina.  2. She plans to follow-up with her other providers.  3. She should contact me regularly, or sooner if her family history changes.    If Christina has any further questions, I encouraged her to contact me via [employer].    Time spent on video: 7 minutes.    Vimal Jewell MS, [facility]  Licensed, Certified Genetic Counselor      Mirian is a 57 year old who is being evaluated via a billable video visit.      How would you like to obtain your AVS? SNADEChart  If the video visit is dropped, the invitation should be resent by: Text to cell phone: [phone]  Will anyone else be joining your video visit? Tonya Jeffery VF      Video-Visit Details    Type of service:  Video Visit     Originating Location (pt. Location): Home  Distant Location (provider location):   Off-site  Platform used for Video Visit: Laura

## 2023-01-30 NOTE — TELEPHONE ENCOUNTER
Prior Authorization Retail Medication Request    Medication/Dose: albuterol (PROAIR HFA/PROVENTIL HFA/VENTOLIN HFA) 108 (90 Base) MCG/ACT inhaler  ICD code (if different than what is on RX):  Acute bronchitis with symptoms > 10 days [J20.9]   Previously Tried and Failed:    Rationale:      Insurance Phone #:  [phone]  Insurance ID:  [id number]      Pharmacy Information (if different than what is on RX)  Name:  Annette  Phone:  [phone]

## 2023-01-30 NOTE — LETTER
"    Cancer Risk Management  Program [facility]  [facility]  [facility]  Mailing Address  Cancer Risk Management Program  [facility]  [address]  [place] [address]    New patient appointments  [phone]  January 30, 2023    Christina Guzman  [address] [id number]    Dear Christina,    It was a pleasure speaking with you over video on 1/30/2023. Here is a copy of the progress note from our discussion. If you have any additional questions, please feel free to call.    Referring Provider: Patti Liang PA-C    Presenting Information:  I spoke to Christina by video today to discuss her genetic testing results. Her blood was drawn on 12/19/22. The Common Hereditary Cancers panel was ordered from [employer]. This testing was done because of Christina's family history of breast and ovarian cancer.    Genetic Testing Result: NEGATIVE  Christina is negative for mutations in APC, CAITY, AXIN2, BARD1, BMPR1A, BRCA1, BRCA2, BRIP1, CDH1, CDK4, CDKN2A, CHEK2, CTNNA1, DICER1, EPCAM, GREM1, HOXB13, KIT, MEN1, MLH1, MSH2, MSH3, MSH6, MUTYH, NBN, NF1, NTHL1, PALB2, PDGFRA, PMS2, POLD1, POLE, PTEN, RAD50, RAD51C, RAD51D, SDHA, SDHB, SDHC, SDHD, SMAD4, SMARCA4, STK11, TP53, TSC1, TSC2, and VHL. No mutations were found in any of the 47 genes analyzed. This test involved sequencing and deletion/duplication analysis of all genes with the exceptions of EPCAM and GREM1 (deletions/duplications only) and SDHA (sequencing only).     A copy of the test report can be found in the Laboratory tab, dated 12/19/22, and named \"LABORATORY MISCELLANEOUS ORDER\". The report is scanned in as a linked document.    Interpretation:  We discussed several different interpretations of this negative test result.    1. One explanation may be that there is a " different gene or combination of genes and environment that are associated with the cancers in this family.  2. It is possible that her mother did have a mutation in one of these genes and she did not inherit it.  3. There is also a small possibility that there is a mutation in one of these genes, and the testing laboratory could not find it with their current testing methods.       Screening:  Based on this negative test result, it is important for Christina and her relatives to refer back to the family history for appropriate cancer screening.      Due to Christina's family history of ovarian cancer, Christina and other close female relatives remain at slightly increased risk for ovarian cancer. We discussed available ovarian cancer screening (pelvic exams, CA-125 blood tests, and transvaginal ultrasounds) as well as the significant limitations of this screening. As such, this screening is not typically recommended. That being said, women in this family should discuss this screening and the signs and symptoms of ovarian cancer with their primary OB/GYN provider, as they may have individualized recommendations.    Based on her personal and family history, Christina has a 10.8% lifetime risk of developing breast cancer based on the DENIZ model. Therefore, Christina does not meet current National Comprehensive Cancer Network (NCCN) guidelines for high risk breast screening, which is offered to women with a 20% lifetime risk or higher. However, it is still important for Christina to continue with routine breast screening under the care of her physicians. Breast cancer screening is generally recommended to begin approximately 10 years younger than the earliest age of breast cancer diagnosis in the family, or at age 40, whichever comes first. In this family, screening may begin at age 40. Christina is encouraged to discuss breast screening with her physicians.     Other population cancer screening options, such as those recommended by  the American Cancer Society and the National Comprehensive Cancer Network (NCCN), are also appropriate for Christina and her family. These screening recommendations may change if there are changes to Christina's personal and/or family history of cancer. Final screening recommendations should be made by each individual's primary care provider.      Inheritance:  We reviewed autosomal dominant inheritance. We discussed that Christina did not pass on an identifiable mutation in these genes to her children based on this test result. Mutations in these genes do not skip generations.      Additional Testing Considerations:  Although Christina's genetic testing result was negative, other relatives may still carry a gene mutation associated with breast and/or ovarian cancer. Genetic counseling is recommended for her siblings and other close maternal relatives to discuss genetic testing options. If any of these relatives do pursue genetic testing, Christina is encouraged to contact me so that we may review the impact of their test results on her.    Summary:  We do not have an explanation for Christina's family history of cancer. While no genetic changes were identified, Christina may still be at risk for certain cancers due to family history, environmental factors, or other genetic causes not identified by this test. Because of that, it is important that she continue with cancer screening based on her personal and family history as discussed above.    Genetic testing is rapidly advancing, and new cancer susceptibility genes will most likely be identified in the future. Therefore, I encouraged Christina to contact me annually or if there are changes in her personal or family history. This may change how we assess her cancer risk, screening, and the testing we would offer.      Plan:  1.  A copy of the test results will be mailed to Christina.  2. She plans to follow-up with her other providers.  3. She should contact me regularly, or sooner  if her family history changes.    If Christina has any further questions, I encouraged her to contact me via [employer].    Time spent on video: 7 minutes.    Vimal Jewell MS, [facility]  Licensed, Certified Genetic Counselor

## 2023-02-01 NOTE — TELEPHONE ENCOUNTER
Central Prior Authorization Team   Phone: [phone]      Prior Authorization Not Needed per Insurance    Medication: albuterol (PROAIR HFA/PROVENTIL HFA/VENTOLIN HFA) 108 (90 Base) MCG/ACT inhaler - PA NOT NEEDED  Insurance Company: [employer] - Phone [phone] Fax [phone]  Expected CoPay:      Pharmacy Filling the Rx: [id number] [address]  [place]  Pharmacy Notified: Yes - verified pharmacy received paid claim   Patient Notified: Yes (pharmacy will notify patient when ready)    Covered - albuterol sulfate CFC-free aerosol (except NDC [id number])

## 2023-02-28 NOTE — PROGRESS NOTES
Mirian is a 57 year old who is being evaluated via a billable video visit.      How would you like to obtain your AVS? MyChart  If the video visit is dropped, the invitation should be resent by: Text to cell phone: [phone]  Will anyone else be joining your video visit? No        Assessment & Plan     Chronic cough  Atypical pneumonia, bronchitis, or other in differential. gerd also possible. Not on ace/arb.  Will start with xray, if negative will treat with ICS and/or prednisone and f/u  If positive will treat with appropriate antibiotic, was already on zpack so would chose differetn regime.  Try tessalon prn cough  She will schedule xray, this is virtual viist  I will f/u after xray  Be seen sooner if fever or worsening symptoms occur    - XR Chest 2 Views; Future  - spacer (OPTICHAMBER DEBBIE) holding chamber; Use with inhaler  - benzonatate (TESSALON) 200 MG capsule; Take 1 capsule (200 mg) by mouth 3 times daily as needed for cough      No follow-ups on file.    Patti Liang PA-C  [facility]   Mirian is a 57 year old accompanied by her Self, presenting for the following health issues:  Cough      History of Present Illness       Reason for visit:  Ongoing cold symptoms  Symptom onset:  More than a month  Symptoms include:  Body aches, stuffy nose, cough, wheezing, shortness of breath  Symptom intensity:  Moderate  Symptom progression:  Staying the same  Had these symptoms before:  Yes  Has tried/received treatment for these symptoms:  Yes  Previous treatment was successful:  Yes  Prior treatment description:  Z-shanna  What makes it worse:  No  What makes it better:  No    She eats 4 or more servings of fruits and vegetables daily.She consumes 0 sweetened beverage(s) daily.She exercises with enough effort to increase her heart rate 9 or less minutes per day.  She exercises with enough effort to increase her heart rate 3 or less days per week.   She is taking  medications regularly.     Went to  1-29 and given zpack and inhaler. Got better for about a week then came back again.   Inhaler doesn't seem to help.   zpack did really seem to help for awhile.   Not sleeping through the night.   Feels like a cold that just won't go away.   Had pneumonia many years ago.   No fever.   Has sob and wheezing.   Dry cough    Review of Systems   Constitutional, HEENT, cardiovascular, pulmonary, GI, , musculoskeletal, neuro, skin, endocrine and psych systems are negative, except as otherwise noted.      Objective           Vitals:  No vitals were obtained today due to virtual visit.    Physical Exam   GENERAL: Healthy, alert and no distress  EYES: Eyes grossly normal to inspection.  No discharge or erythema, or obvious scleral/conjunctival abnormalities.  RESP: cough present. Frequent. No audible wheeze or visible cyanosis.  No visible retractions or increased work of breathing.    SKIN: Visible skin clear. No significant rash, abnormal pigmentation or lesions.  NEURO: Cranial nerves grossly intact.  Mentation and speech appropriate for age.  PSYCH: Mentation appears normal, affect normal/bright, judgement and insight intact, normal speech and appearance well-groomed.            Video-Visit Details    Type of service:  Video Visit     Originating Location (pt. Location): Home  Distant Location (provider location):  On-site  Platform used for Video Visit: [employer]

## 2023-03-02 ENCOUNTER — ANCILLARY PROCEDURE (OUTPATIENT)
Dept: GENERAL RADIOLOGY | Facility: CLINIC | Age: 58
End: 2023-03-02
Attending: PHYSICIAN ASSISTANT
Payer: COMMERCIAL

## 2023-03-02 ENCOUNTER — VIRTUAL VISIT (OUTPATIENT)
Dept: FAMILY MEDICINE | Facility: CLINIC | Age: 58
End: 2023-03-02
Payer: COMMERCIAL

## 2023-03-02 DIAGNOSIS — R05.3 CHRONIC COUGH: Primary | ICD-10-CM

## 2023-03-02 DIAGNOSIS — R05.3 CHRONIC COUGH: ICD-10-CM

## 2023-03-02 PROCEDURE — 71046 X-RAY EXAM CHEST 2 VIEWS: CPT | Mod: TC | Performed by: RADIOLOGY

## 2023-03-02 PROCEDURE — 99214 OFFICE O/P EST MOD 30 MIN: CPT | Mod: VID | Performed by: PHYSICIAN ASSISTANT

## 2023-03-02 RX ORDER — INHALER, ASSIST DEVICES
SPACER (EA) MISCELLANEOUS
Qty: 1 EACH | Refills: 0 | Status: SHIPPED | OUTPATIENT
Start: 2023-03-02 | End: 2024-06-03

## 2023-03-02 RX ORDER — BENZONATATE 200 MG/1
200 CAPSULE ORAL 3 TIMES DAILY PRN
Qty: 60 CAPSULE | Refills: 1 | Status: SHIPPED | OUTPATIENT
Start: 2023-03-02 | End: 2023-11-08

## 2023-03-03 DIAGNOSIS — J40 BRONCHITIS: Primary | ICD-10-CM

## 2023-03-03 RX ORDER — FLUTICASONE FUROATE AND VILANTEROL 200; 25 UG/1; UG/1
1 POWDER RESPIRATORY (INHALATION) DAILY
Qty: 1 EACH | Refills: 1 | Status: SHIPPED | OUTPATIENT
Start: 2023-03-03 | End: 2023-11-08

## 2023-03-03 RX ORDER — METHYLPREDNISOLONE 4 MG
TABLET, DOSE PACK ORAL
Qty: 21 TABLET | Refills: 0 | Status: SHIPPED | OUTPATIENT
Start: 2023-03-03 | End: 2023-11-08

## 2023-03-03 NOTE — RESULT ENCOUNTER NOTE
PLEASE CALL PATIENT:     John Vasquez,       Your recent test results are attached, if you have any questions or concerns please feel free to contact me via e-mail or call [phone].  Negative for infection on ct.   I have sent over inhaled steroid to try for cough and steroids.   F/u if not better in a week in person.      It was a pleasure to see you at your recent office visit.      Sincerely,  Patti Liang PA-C

## 2023-03-07 ENCOUNTER — TELEPHONE (OUTPATIENT)
Dept: FAMILY MEDICINE | Facility: CLINIC | Age: 58
End: 2023-03-07

## 2023-03-07 DIAGNOSIS — R05.3 CHRONIC COUGH: Primary | ICD-10-CM

## 2023-03-07 NOTE — TELEPHONE ENCOUNTER
Central Prior Authorization Team   Phone: [phone]      PA Initiation    Medication: fluticasone-vilanterol (BREO ELLIPTA) 200-25 MCG/ACT inhaler  Insurance Company: [employer] - Phone [phone] Fax [phone]  Pharmacy Filling the Rx: [facility] #[id number] - [place], [place] - [address]  Filling Pharmacy Phone:    Filling Pharmacy Fax:    Start Date: 3/7/2023

## 2023-03-11 NOTE — TELEPHONE ENCOUNTER
Central Prior Authorization Team   Phone: [phone]      PRIOR AUTHORIZATION DENIED    Medication: fluticasone-vilanterol (BREO ELLIPTA) 200-25 MCG/ACT inhaler    Denial Date: 3/7/2023    Denial Rational: TRY/FAIL BOTH ADVAIR DISKUS AND SYMBICORT.               Appeal Information:

## 2023-03-13 RX ORDER — BUDESONIDE AND FORMOTEROL FUMARATE DIHYDRATE 160; 4.5 UG/1; UG/1
2 AEROSOL RESPIRATORY (INHALATION) 2 TIMES DAILY
Qty: 10.2 G | Refills: 1 | Status: SHIPPED | OUTPATIENT
Start: 2023-03-13 | End: 2023-03-30

## 2023-03-28 ENCOUNTER — TELEPHONE (OUTPATIENT)
Dept: NURSING | Facility: CLINIC | Age: 58
End: 2023-03-28

## 2023-03-28 NOTE — TELEPHONE ENCOUNTER
Scheduled patient for 3/30/23 at 1:30 pm with provider.  Luna DIAMOND    [facility]

## 2023-03-28 NOTE — PROGRESS NOTES
Assessment & Plan     Chronic cough  Copd in differential may need pulm/spirometry in future  Bronchitis, infection, reflux in differential also  Due to smoking history would get lung ct if does no resolve in 2 weeks again or if worse at any time  Start taking breo daily  Take until cough gone then for another week then stop  Continue albuterol prn  If not improving in a week let me know and I will refer you to ENT    Consider lung specialist in future as well and lung ct in future as well if needed      History of smoking            [facility]    Remberto Vela is a 57 year old, presenting for the following health issues:  Cough  No flowsheet data found.  History of Present Illness       Reason for visit:  Ongoing cough    She eats 2-3 servings of fruits and vegetables daily.She consumes 0 sweetened beverage(s) daily.She exercises with enough effort to increase her heart rate 9 or less minutes per day.  She exercises with enough effort to increase her heart rate 3 or less days per week.   She is taking medications regularly.     Had same complaint and negative chest xray 30 days ago here.  We had done medrol dose pack and ICS and symptoms improved but she stopped taking ics and they have returned.   Former smoker.     Tessalon pearls do seem to help cough.   Medrol dose pack seemed to help a lot, cough went away, but then came back last tuesday again and is very bothersome again. Talking makes it worse.     Takes 40 mg omeprazole. Has been on for awhile.   No weight loss of constitutional symptoms.       From last note:     Chronic cough  Atypical pneumonia, bronchitis, or other in differential. gerd also possible. Not on ace/arb.  Will start with xray, if negative will treat with ICS and/or prednisone and f/u  If positive will treat with appropriate antibiotic, was already on zpack so would chose differetn regime.  Try tessalon prn cough  She will schedule  "xray, this is virtual viist  I will f/u after xray  Be seen sooner if fever or worsening symptoms occur     - XR Chest 2 Views; Future  - spacer (OPTICHAMBER DEBBIE) holding chamber; Use with inhaler  - benzonatate (TESSALON) 200 MG capsule; Take 1 capsule (200 mg) by mouth 3 times daily as needed for cough    From  note seen before there::  \"Went to  1-29 and given zpack and inhaler. Got better for about a week then came back again.   Inhaler doesn't seem to help.   zpack did really seem to help for awhile.   Not sleeping through the night.   Feels like a cold that just won't go away.   Had pneumonia many years ago.   No fever.   Has sob and wheezing.   Dry cough\"      Review of Systems   Constitutional, HEENT, cardiovascular, pulmonary, GI, , musculoskeletal, neuro, skin, endocrine and psych systems are negative, except as otherwise noted.      Objective    /85   Pulse 93   Temp 97.9  F (36.6  C) (Tympanic)   Resp 16   Ht 1.753 m (5' 9\")   Wt 96.2 kg (212 lb)   LMP 05/25/2018 (Exact Date)   SpO2 98%   Breastfeeding No   BMI 31.31 kg/m    Body mass index is 31.31 kg/m .  Physical Exam   GENERAL:  No acute distress.  Interacts appropriately.  Breathing without difficulty.  Alert.  HEENT:  Tympanic membranes intact without effusion or erythema.  Oral mucosa moist. Posterior pharynx has no erythema.  Posterior pharynx has no exudate. no edema.  NECK:  Soft and supple.  without tenderness.  nio lymphadenopathy.  Normal range of motion.    CARDIAC:   Regular rate and rhythm.  No murmurs, rubs, or gallops.   PULMONARY: Clear to auscultation bilaterally.  No  wheezes, crackles, or rhonchi.  Normal air exchange/breath sounds.  No use of accessory muscles.    PSYCH: Normal affect.  SKIN: No rashes.                  "

## 2023-03-28 NOTE — TELEPHONE ENCOUNTER
Reason for Call:  Appointment Request    Patient requesting this type of appt:  follow    Requested provider: Patti Liang    Reason patient unable to be scheduled: Not within requested timeframe    When does patient want to be seen/preferred time: Same day    Comments: follow up symptoms worsen    [facility] or would you prefer to receive a phone call?:   Patient would prefer a phone call   Okay to leave a detailed message?: Yes at Cell number on file:    Telephone Information:   Mobile [phone]       Call taken on 3/28/2023 at 2:20 PM by Ayde Moncada

## 2023-03-30 ENCOUNTER — OFFICE VISIT (OUTPATIENT)
Dept: FAMILY MEDICINE | Facility: CLINIC | Age: 58
End: 2023-03-30
Payer: COMMERCIAL

## 2023-03-30 VITALS
DIASTOLIC BLOOD PRESSURE: 85 MMHG | RESPIRATION RATE: 16 BRPM | SYSTOLIC BLOOD PRESSURE: 131 MMHG | HEART RATE: 93 BPM | WEIGHT: 212 LBS | BODY MASS INDEX: 31.4 KG/M2 | HEIGHT: 69 IN | TEMPERATURE: 97.9 F | OXYGEN SATURATION: 98 %

## 2023-03-30 DIAGNOSIS — R05.3 CHRONIC COUGH: Primary | ICD-10-CM

## 2023-03-30 DIAGNOSIS — Z87.891 HISTORY OF SMOKING: ICD-10-CM

## 2023-03-30 PROCEDURE — 99214 OFFICE O/P EST MOD 30 MIN: CPT | Performed by: PHYSICIAN ASSISTANT

## 2023-03-30 ASSESSMENT — PAIN SCALES - GENERAL: PAINLEVEL: NO PAIN (0)

## 2023-03-30 NOTE — PATIENT INSTRUCTIONS
Start taking breo daily  Take until cough gone then for another week then stop    If not improving in a week let me know and I will refer you to ENT

## 2023-05-16 ENCOUNTER — MYC REFILL (OUTPATIENT)
Dept: FAMILY MEDICINE | Facility: CLINIC | Age: 58
End: 2023-05-16
Payer: COMMERCIAL

## 2023-05-16 DIAGNOSIS — L90.0 LICHEN SCLEROSUS: ICD-10-CM

## 2023-05-16 RX ORDER — CLOBETASOL PROPIONATE 0.5 MG/G
OINTMENT TOPICAL 2 TIMES DAILY
OUTPATIENT
Start: 2023-05-16

## 2023-06-27 NOTE — PROGRESS NOTES
Assessment & Plan     Morbid obesity (H)  Given handout    Hyperlipidemia LDL goal <130  Stable/to goal  - atorvastatin (LIPITOR) 40 MG tablet; TAKE 1 TABLET DAILY    Gastroesophageal reflux disease without esophagitis  stable  - omeprazole (PRILOSEC) 40 MG DR capsule; Take 1 capsule (40 mg) by mouth daily    Encounter for screening mammogram for breast cancer    - MA SCREENING DIGITAL BILAT - Future  (s+30); Future    Adjustment disorder with depressed mood  Worsening  Side effects/expectations discussed  Went over handout below  See patient instructions below for more plan.  Consider adding or changing to prozac in future  Of trying effexor again    - MENTAL HEALTH REFERRAL  - Adult; Outpatient Treatment; Individual/Couples/Family/Group Therapy/Health Psychology; Other: [facility] [phone]; We will contact you to schedule the appointment or please call with any questions  - buPROPion (WELLBUTRIN XL) 150 MG 24 hr tablet; Take 1 tablet (150 mg) by mouth every morning    Patient Instructions   1)     Re-check with me in 4-6 weeks.  In clinic or video.     Call with side effects or concerns.     Medication should start to work within 1-2 weeks but will not have full effect for about 6 weeks.     If medication makes you feel worse, stop right away and recheck with me.     If suicidal thoughts or plan occur, call 911 or go to emergency room.         2)  As your body allows, Try to start exercising see this article from [facility]:  Depression and anxiety: Exercise eases symptoms  Depression and anxiety symptoms often improve with exercise. Here are some realistic tips to help you get started and stay motivated.  By [facility] Staff   When you have depression or anxiety, exercise often seems like the last thing you want to do. But once you get motivated, exercise can make a big difference.  Exercise helps prevent and improve a number of health problems, including high blood pressure, diabetes and  arthritis. Research on depression, anxiety and exercise shows that the psychological and physical benefits of exercise can also help improve mood and reduce anxiety.  The links between depression, anxiety and exercise aren't entirely clear -- but working out and other forms of physical activity can definitely ease symptoms of depression or anxiety and make you feel better. Exercise may also help keep depression and anxiety from coming back once you're feeling better.  How does exercise help depression and anxiety?  Regular exercise may help ease depression and anxiety by:  Releasing feel-good endorphins, natural cannabis-like brain chemicals (endogenous cannabinoids) and other natural brain chemicals that can enhance your sense of well-being   Taking your mind off worries so you can get away from the cycle of negative thoughts that feed depression and anxiety  Regular exercise has many psychological and emotional benefits, too. It can help you:  Gain confidence. Meeting exercise goals or challenges, even small ones, can boost your self-confidence. Getting in shape can also make you feel better about your appearance.   Get more social interaction. Exercise and physical activity may give you the chance to meet or socialize with others. Just exchanging a friendly smile or greeting as you walk around your neighborhood can help your mood.   [place] in a healthy way. Doing something positive to manage depression or anxiety is a healthy coping strategy. Trying to feel better by drinking alcohol, dwelling on how you feel, or hoping depression or anxiety will go away on its own can lead to worsening symptoms.  Is a structured exercise program the only option?  Some research shows that physical activity such as regular walking -- not just formal exercise programs -- may help improve mood. Physical activity and exercise are not the same thing, but both are beneficial to your health.  Physical activity is any activity that works  "your muscles and requires energy and can include work or household or leisure activities.   Exercise is a planned, structured and repetitive body movement done to improve or maintain physical fitness.  The word \"exercise\" may make you think of running laps around the gym. But exercise includes a wide range of activities that boost your activity level to help you feel better.  Certainly running, lifting weights, playing basketball and other fitness activities that get your heart pumping can help. But so can physical activity such as gardening, washing your car, walking around the block or engaging in other less intense activities. Any physical activity that gets you off the couch and moving can help improve your mood.  You don't have to do all your exercise or other physical activity at once. Broaden how you think of exercise and find ways to add small amounts of physical activity throughout your day. For example, take the stairs instead of the elevator. Park a little farther away from work to fit in a short walk. Or, if you live close to your job, consider biking to work.              Please schedule mammogram as soon as possible on [facility] or through our clinic number      Lifestyle recommendations:  Being overweight or obese puts you are risk of major health problems including but not limited to: heart disease/heart attack, stroke, high cholesterol, high blood pressure, and diabetes.  This is why it is important to be at a healthy weight for your height.     Exercise 30 minutes 3-5 times a week, if you can only do 10 minutes 3 times a week that is still shown to have great benefit!  Brisk walking even counts for this.  Consider free youtube videos for exercise that fits your needs and lifestyle.     Monitor your caffeine and soda intake, try to minimize these beverages    Drink plenty of water (about 70-80 ounces a day)    Try to eat a vegetable and fruit  with lunch and dinner.  Have a breakfast that contains " "protein such as eggs or oatmeal.  Decrease your white bread, pasta, and sweets intake.  Increase lean proteins like chicken or pork. Try to eat out 1-2 times a week or less.  Monitor your portion sizes, try using smaller plates if needed.  Eat slowly, this gives you time to be aware that your body is full.   Let me know at any time if you would like a referral to a nutritionist!                 BMI:   Estimated body mass index is 35.87 kg/m  as calculated from the following:    Height as of this encounter: 1.626 m (5' 4\").    Weight as of this encounter: 94.8 kg (209 lb).   Weight management plan: Discussed healthy diet and exercise guidelines    Depression Screening Follow Up    PHQ 4/19/2021   PHQ-9 Total Score 17   Q9: Thoughts of better off dead/self-harm past 2 weeks Not at all     Last PHQ-9 4/19/2021   1.  Little interest or pleasure in doing things 3   2.  Feeling down, depressed, or hopeless 3   3.  Trouble falling or staying asleep, or sleeping too much 3   4.  Feeling tired or having little energy 3   5.  Poor appetite or overeating 3   6.  Feeling bad about yourself 2   7.  Trouble concentrating 0   8.  Moving slowly or restless 0   Q9: Thoughts of better off dead/self-harm past 2 weeks 0   PHQ-9 Total Score 17   Difficulty at work, home, or with people Extremely dIfficult       Follow Up Actions Taken  Crisis resource information provided in After Visit Summary  Patient counseled, no additional follow up at this time.         Return in about 4 weeks (around 5/17/2021) for med recheck.    Patti Liang PA-C  [facility]   Mirian is a 55 year old who presents for the following health issues  accompanied by her Self:    HPI           MAMMO is due per pt, pt will call back to [facility] appt.    Abnormal Mood Symptoms  Onset/Duration: on and off for a while but worst the past 3 months now  Depression (if yes, do PHQ-9): YES  Anxiety (if yes, do CAROLINE-7): no  Accompanying " Signs & Symptoms:  Still participating in activities that you used to enjoy: no  Fatigue: YES  Irritability: YES  Difficulty concentrating: YES  Changes in appetite: no  Problems with sleep: YES- per pt noticing that she is sleeping all the time.  Heart racing/beating fast: no  Abnormally elevated, expansive, or irritable mood: YES  Persistently increased activity or energy: no  Thoughts of hurting yourself or others: no  History:  Recent stress or major life event: no  Prior depression or anxiety: Maybe unsure per pt.  Family history of depression or anxiety: no  Alcohol/drug use: no  Difficulty sleeping: YES- sleeping to much per pt.    Therapies tried and outcome: Medication and spiritual treatments?  No flowsheet data found.  CAROLINE-7 SCORE 12/11/2012 4/19/2021   Total Score 8 -   Total Score - 1     Patient is here for anhedonia and sleeping often.   On effexor 75 mg in 2013. May have been for menopause.  Does not remember any side effects but also does not remember if it helped or not. \  Therapist? Is interested in this per patient. Doesn't have one.     Saw neurologist for headaches and went to the emergency room.  Was told likely post-covid per patient. Told to start on vitamin b2 for headaches. That is helping.   [place] ok a month after having covid but then started feeling more tired and she wonders if it is depression phq 9 scores are poor. Caroline is normal. She denies anxiety.   Feels anhedonia. Has had depression before. Has gone to [facility], etc. Before for this but covid restricts this.     Anxiety scores are normal.     Still is taking vitamin D.  Also taking vitamin b12.     Denies suicidal or homicidal thoughts.  Patient instructed to go to the emergency room or call 911 if these occur.      Morbid obesity-BMI over 35 and has GERD. On prilosec for this. No dark or bloody stools.  Bmp normal and done recently.   Creatinine   Date Value Ref Range Status   04/09/2021 0.77 0.52 - 1.04 mg/dL Final         Also  "high cholesterol. On statin. Would like refill. Up to date on labs.      LDL Cholesterol Calculated   Date Value Ref Range Status   04/09/2021 95 <100 mg/dL Final     Comment:     Desirable:       <100 mg/dl       Review of Systems   Constitutional, HEENT, cardiovascular, pulmonary, GI, , musculoskeletal, neuro, skin, endocrine and psych systems are negative, except as otherwise noted.      Objective    /86   Pulse 81   Temp 97.2  F (36.2  C) (Tympanic)   Resp 16   Ht 1.626 m (5' 4\")   Wt 94.8 kg (209 lb)   LMP 05/25/2018 (Exact Date)   SpO2 100%   BMI 35.87 kg/m    Body mass index is 35.87 kg/m .  Physical Exam   GENERAL: alert, no distress and obese  NECK: no adenopathy, no asymmetry, masses, or scars and thyroid normal to palpation  RESP: lungs clear to auscultation - no rales, rhonchi or wheezes  CV: regular rate and rhythm, normal S1 S2, no S3 or S4, no murmur, click or rub, no peripheral edema and peripheral pulses strong  MS: no gross musculoskeletal defects noted, no edema  NEURO: Normal strength and tone, mentation intact and speech normal  PSYCH: mentation appears normal, affect normal/bright            " 25-Jun-2023 20:03

## 2023-07-06 ENCOUNTER — OFFICE VISIT (OUTPATIENT)
Dept: URGENT CARE | Facility: URGENT CARE | Age: 58
End: 2023-07-06
Payer: COMMERCIAL

## 2023-07-06 VITALS
OXYGEN SATURATION: 98 % | HEART RATE: 82 BPM | SYSTOLIC BLOOD PRESSURE: 128 MMHG | BODY MASS INDEX: 31.31 KG/M2 | WEIGHT: 212 LBS | TEMPERATURE: 98.5 F | RESPIRATION RATE: 16 BRPM | DIASTOLIC BLOOD PRESSURE: 83 MMHG

## 2023-07-06 DIAGNOSIS — T63.464A WASP STING, UNDETERMINED INTENT, INITIAL ENCOUNTER: Primary | ICD-10-CM

## 2023-07-06 PROCEDURE — 99213 OFFICE O/P EST LOW 20 MIN: CPT | Performed by: NURSE PRACTITIONER

## 2023-07-06 RX ORDER — TRIAMCINOLONE ACETONIDE 1 MG/G
CREAM TOPICAL 2 TIMES DAILY
Qty: 15 G | Refills: 0 | Status: SHIPPED | OUTPATIENT
Start: 2023-07-06

## 2023-07-06 RX ORDER — PREDNISONE 20 MG/1
40 TABLET ORAL DAILY
Qty: 10 TABLET | Refills: 0 | Status: SHIPPED | OUTPATIENT
Start: 2023-07-06 | End: 2023-07-11

## 2023-07-06 ASSESSMENT — ENCOUNTER SYMPTOMS
CHILLS: 0
DIAPHORESIS: 0
FATIGUE: 0
FEVER: 0

## 2023-07-06 NOTE — PATIENT INSTRUCTIONS
Take medication as prescribed. If symptoms worsens return for reevaluation or follow up with PCP.

## 2023-07-06 NOTE — PROGRESS NOTES
Assessment & Plan       ICD-10-CM    1. Wasp sting, undetermined intent, initial encounter  T63.464A predniSONE (DELTASONE) 20 MG tablet     triamcinolone (KENALOG) 0.1 % external cream           Patient instructions:  Take medication as prescribed. If symptoms worsens return for reevaluation or follow up with PCP.     Medical decision making:  Pt reports being stung yesterday by wasp on hand and possibly arm as well. On exam appears to be hypersensitivity reaction to the sting to left hand and right arm. No signs of infection where feel antibiotic is needed at this time. Will start patient on prednisone for next 5 days along with benadryl/zytrec as needed and triamcinolone for itching. Pt instructed to return for any worsening symptoms or go to ER.     No follow-ups on file.    At the end of the encounter, I discussed results, diagnosis, medications. Discussed red flags for immediate return to clinic/ER, as well as indications for follow up if no improvement. Patient understood and agreed to plan. Patient was stable for discharge.    Remberto Vela is a 57 year old female who presents to clinic today the following health issues:  Chief Complaint   Patient presents with     Insect Bites     Multiple wasp stings yesterday- left hand swelling      Pt reports she was stung by multiple wasps yesterday, swelling to left hand.           Review of Systems   Constitutional: Negative for chills, diaphoresis, fatigue and fever.   Skin: Positive for rash.       Problem List:  2022-09: BMI 34.0-34.9,adult  2022-09: Vaginal dryness  2022-09: Lichen sclerosus  2022-09: Diverticulosis of large intestine without hemorrhage  2022-02: Advanced directives, counseling/discussion  2020-10: Morbid obesity (H)  2018-10: H/O carotid endarterectomy  2018-10: TIA (transient ischemic attack)  2017-12: Low back pain, unspecified back pain laterality, unspecified   chronicity, with sciatica presence unspecified  2015-10: Skin tag  2014-11:  Toe pain  2013-05: GERD (gastroesophageal reflux disease)  2013-03: Vitamin D deficiency  2012-12: Menopausal depression  2012-12: Menopausal syndrome  2012-03: Low back pain  2011-05: Migraine headache  2010-10: CARDIOVASCULAR SCREENING; LDL GOAL LESS THAN 160  2010-10: Chronic idiopathic urticaria  2010-09: Urticaria  2010-09: Tobacco abuse  2010-02: Low back pain  House dust mite allergy      Past Medical History:   Diagnosis Date     Chronic idiopathic urticaria x 9/09    IgE 638, Tryptase=11 (min. elevated)     Fructose disorder     Fructose Malabsorbtion     House dust mite allergy 9/7/10 RAST    DM only     Menopausal depression 12/11/2012     Migraines        Social History     Tobacco Use     Smoking status: Former     Packs/day: 0.50     Years: 30.00     Pack years: 15.00     Types: Cigarettes     Smokeless tobacco: Never   Substance Use Topics     Alcohol use: No     Comment: occ           Objective    /83   Pulse 82   Temp 98.5  F (36.9  C) (Tympanic)   Resp 16   Wt 96.2 kg (212 lb)   LMP 05/25/2018 (Exact Date)   SpO2 98%   BMI 31.31 kg/m    Physical Exam  Constitutional:       Appearance: Normal appearance. She is not toxic-appearing or diaphoretic.   Cardiovascular:      Rate and Rhythm: Normal rate.   Pulmonary:      Effort: Pulmonary effort is normal.   Skin:     Findings: Erythema present.      Comments: Mild erythema and swelling to left hand. Mild warmth. No signs of surrounding infection. Small area of erythema and warmth to R upper arm as well.    Neurological:      Mental Status: She is alert.              MARIANO HUDSON CNP

## 2023-08-01 ENCOUNTER — TRANSFERRED RECORDS (OUTPATIENT)
Dept: HEALTH INFORMATION MANAGEMENT | Facility: CLINIC | Age: 58
End: 2023-08-01
Payer: COMMERCIAL

## 2023-08-07 ENCOUNTER — TRANSFERRED RECORDS (OUTPATIENT)
Dept: HEALTH INFORMATION MANAGEMENT | Facility: CLINIC | Age: 58
End: 2023-08-07
Payer: COMMERCIAL

## 2023-08-09 ENCOUNTER — TRANSFERRED RECORDS (OUTPATIENT)
Dept: HEALTH INFORMATION MANAGEMENT | Facility: CLINIC | Age: 58
End: 2023-08-09
Payer: COMMERCIAL

## 2023-09-28 ENCOUNTER — TRANSFERRED RECORDS (OUTPATIENT)
Dept: HEALTH INFORMATION MANAGEMENT | Facility: CLINIC | Age: 58
End: 2023-09-28
Payer: COMMERCIAL

## 2023-10-27 DIAGNOSIS — K21.9 GASTROESOPHAGEAL REFLUX DISEASE WITHOUT ESOPHAGITIS: ICD-10-CM

## 2023-10-27 NOTE — TELEPHONE ENCOUNTER
[facility] requesting refill on Rx omeprazole (PRILOSEC) 40 MG DR capsule. Med pended in chart.    [facility] mail order service is pharmacy    [place] Alex,    [facility]       Slit Excision Additional Text (Leave Blank If You Do Not Want): A linear line was drawn on the skin overlying the lesion. An incision was made slowly until the lesion was visualized.  Once visualized, the lesion was removed with blunt dissection.

## 2023-10-29 ENCOUNTER — HEALTH MAINTENANCE LETTER (OUTPATIENT)
Age: 58
End: 2023-10-29

## 2023-10-30 RX ORDER — OMEPRAZOLE 40 MG/1
40 CAPSULE, DELAYED RELEASE ORAL DAILY
Qty: 30 CAPSULE | Refills: 0 | Status: SHIPPED | OUTPATIENT
Start: 2023-10-30 | End: 2023-11-08

## 2023-11-01 ASSESSMENT — ENCOUNTER SYMPTOMS
FEVER: 0
EYE PAIN: 0
WEAKNESS: 0
HEMATURIA: 0
DYSURIA: 0
JOINT SWELLING: 0
HEARTBURN: 0
NAUSEA: 0
BREAST MASS: 0
SORE THROAT: 0
NERVOUS/ANXIOUS: 0
ARTHRALGIAS: 0
HEADACHES: 1
CONSTIPATION: 0
FREQUENCY: 0
SHORTNESS OF BREATH: 0
DIZZINESS: 0
PALPITATIONS: 0
DIARRHEA: 1
ABDOMINAL PAIN: 0
HEMATOCHEZIA: 0
PARESTHESIAS: 0
COUGH: 0
CHILLS: 0
MYALGIAS: 0

## 2023-11-08 ENCOUNTER — OFFICE VISIT (OUTPATIENT)
Dept: FAMILY MEDICINE | Facility: CLINIC | Age: 58
End: 2023-11-08
Payer: COMMERCIAL

## 2023-11-08 VITALS
HEIGHT: 64 IN | RESPIRATION RATE: 20 BRPM | BODY MASS INDEX: 37.25 KG/M2 | WEIGHT: 218.2 LBS | DIASTOLIC BLOOD PRESSURE: 83 MMHG | SYSTOLIC BLOOD PRESSURE: 126 MMHG | TEMPERATURE: 97.2 F | HEART RATE: 83 BPM | OXYGEN SATURATION: 99 %

## 2023-11-08 DIAGNOSIS — K21.9 GASTROESOPHAGEAL REFLUX DISEASE WITHOUT ESOPHAGITIS: ICD-10-CM

## 2023-11-08 DIAGNOSIS — E66.812 CLASS 2 SEVERE OBESITY DUE TO EXCESS CALORIES WITH SERIOUS COMORBIDITY IN ADULT, UNSPECIFIED BMI (H): ICD-10-CM

## 2023-11-08 DIAGNOSIS — Z00.00 ROUTINE GENERAL MEDICAL EXAMINATION AT A HEALTH CARE FACILITY: Primary | ICD-10-CM

## 2023-11-08 DIAGNOSIS — E78.5 HYPERLIPIDEMIA LDL GOAL <130: ICD-10-CM

## 2023-11-08 DIAGNOSIS — Z23 NEED FOR VACCINATION: ICD-10-CM

## 2023-11-08 DIAGNOSIS — G89.29 CHRONIC LEFT-SIDED LOW BACK PAIN, UNSPECIFIED WHETHER SCIATICA PRESENT: ICD-10-CM

## 2023-11-08 DIAGNOSIS — E66.01 CLASS 2 SEVERE OBESITY DUE TO EXCESS CALORIES WITH SERIOUS COMORBIDITY IN ADULT, UNSPECIFIED BMI (H): ICD-10-CM

## 2023-11-08 DIAGNOSIS — Z13.1 SCREENING FOR DIABETES MELLITUS: ICD-10-CM

## 2023-11-08 DIAGNOSIS — Z13.21 ENCOUNTER FOR VITAMIN DEFICIENCY SCREENING: ICD-10-CM

## 2023-11-08 DIAGNOSIS — M54.50 CHRONIC LEFT-SIDED LOW BACK PAIN, UNSPECIFIED WHETHER SCIATICA PRESENT: ICD-10-CM

## 2023-11-08 LAB — HBA1C MFR BLD: 5.7 % (ref 0–5.6)

## 2023-11-08 PROCEDURE — 90471 IMMUNIZATION ADMIN: CPT | Performed by: NURSE PRACTITIONER

## 2023-11-08 PROCEDURE — 91320 SARSCV2 VAC 30MCG TRS-SUC IM: CPT | Performed by: NURSE PRACTITIONER

## 2023-11-08 PROCEDURE — 99396 PREV VISIT EST AGE 40-64: CPT | Mod: 25 | Performed by: NURSE PRACTITIONER

## 2023-11-08 PROCEDURE — 99214 OFFICE O/P EST MOD 30 MIN: CPT | Mod: 25 | Performed by: NURSE PRACTITIONER

## 2023-11-08 PROCEDURE — 80061 LIPID PANEL: CPT | Performed by: NURSE PRACTITIONER

## 2023-11-08 PROCEDURE — 90480 ADMN SARSCOV2 VAC 1/ONLY CMP: CPT | Performed by: NURSE PRACTITIONER

## 2023-11-08 PROCEDURE — 83036 HEMOGLOBIN GLYCOSYLATED A1C: CPT | Performed by: NURSE PRACTITIONER

## 2023-11-08 PROCEDURE — 80048 BASIC METABOLIC PNL TOTAL CA: CPT | Performed by: NURSE PRACTITIONER

## 2023-11-08 PROCEDURE — 90686 IIV4 VACC NO PRSV 0.5 ML IM: CPT | Performed by: NURSE PRACTITIONER

## 2023-11-08 PROCEDURE — 82306 VITAMIN D 25 HYDROXY: CPT | Performed by: NURSE PRACTITIONER

## 2023-11-08 PROCEDURE — 36415 COLL VENOUS BLD VENIPUNCTURE: CPT | Performed by: NURSE PRACTITIONER

## 2023-11-08 RX ORDER — CYCLOBENZAPRINE HCL 10 MG
5-10 TABLET ORAL 2 TIMES DAILY PRN
Qty: 90 TABLET | Refills: 0 | Status: SHIPPED | OUTPATIENT
Start: 2023-11-08

## 2023-11-08 RX ORDER — OMEPRAZOLE 40 MG/1
40 CAPSULE, DELAYED RELEASE ORAL DAILY
Qty: 30 CAPSULE | Refills: 0 | Status: SHIPPED | OUTPATIENT
Start: 2023-11-08 | End: 2023-12-14

## 2023-11-08 RX ORDER — ATORVASTATIN CALCIUM 40 MG/1
TABLET, FILM COATED ORAL
Qty: 90 TABLET | Refills: 1 | Status: SHIPPED | OUTPATIENT
Start: 2023-11-08 | End: 2024-05-05

## 2023-11-08 ASSESSMENT — ENCOUNTER SYMPTOMS
FREQUENCY: 0
HEADACHES: 1
SORE THROAT: 0
NAUSEA: 0
CHILLS: 0
DIZZINESS: 0
PARESTHESIAS: 0
ARTHRALGIAS: 0
COUGH: 0
HEMATOCHEZIA: 0
FEVER: 0
CONSTIPATION: 0
DIARRHEA: 1
EYE PAIN: 0
JOINT SWELLING: 0
HEARTBURN: 0
DYSURIA: 0
HEMATURIA: 0
BREAST MASS: 0
SHORTNESS OF BREATH: 0
ABDOMINAL PAIN: 0
PALPITATIONS: 0
NERVOUS/ANXIOUS: 0
MYALGIAS: 0
WEAKNESS: 0

## 2023-11-08 ASSESSMENT — PAIN SCALES - GENERAL: PAINLEVEL: NO PAIN (0)

## 2023-11-08 NOTE — PROGRESS NOTES
SUBJECTIVE:   CC: Mirian is an 58 year old who presents for preventive health visit.       2023     3:02 PM   Additional Questions   Roomed by Bia DYKES   Accompanied by self           Patient here for yearly preventative care visit. In addition, they have the following concerns:    -no concerns. Doing intermittent fasting cycles. This week 12 and 16 hour fasting cycles. Increasing her plant intake.     1. Medication refills: cyclobenzaprine, atorvastatin, omeprazole    Menopause for 4 or 5 years    Healthy Habits:     Getting at least 3 servings of Calcium per day:  NO    Bi-annual eye exam:  Yes    Dental care twice a year:  Yes    Sleep apnea or symptoms of sleep apnea:  Daytime drowsiness    Diet:  Regular (no restrictions)    Frequency of exercise:  None    Taking medications regularly:  Yes    Medication side effects:  None    Additional concerns today:  No    Social History     Tobacco Use    Smoking status: Former     Packs/day: 0.50     Years: 30.00     Additional pack years: 0.00     Total pack years: 15.00     Types: Cigarettes     Quit date:      Years since quittin.8    Smokeless tobacco: Never   Substance Use Topics    Alcohol use: No     Comment: occ             2023     9:46 AM   Alcohol Use   Prescreen: >3 drinks/day or >7 drinks/week? Not Applicable     Reviewed orders with patient.  Reviewed health maintenance and updated orders accordingly - Yes  Lab work is in process    Breast Cancer Screening:    FHS-7:       2021     3:31 PM 2022    10:08 AM   Breast CA Risk Assessment (FHS-7)   Did any of your first-degree relatives have breast or ovarian cancer? Yes Yes   Did any of your relatives have bilateral breast cancer? No No   Did any man in your family have breast cancer? No No   Did any woman in your family have breast and ovarian cancer? No No   Did any woman in your family have breast cancer before age 50 y? No No   Do you have 2 or more relatives with breast and/or  ovarian cancer? No Yes   Do you have 2 or more relatives with breast and/or bowel cancer? No No     -Already had BRCA testing, was negative    Mammogram Screening: Recommended mammography every 1-2 years with patient discussion and risk factor consideration  Pertinent mammograms are reviewed under the imaging tab.    History of abnormal Pap smear: NO - age 30-65 PAP every 5 years with negative HPV co-testing recommended      Latest Ref Rng & Units 10/5/2020     8:21 AM 10/5/2020     7:52 AM 4/4/2017     3:18 PM   PAP / HPV   PAP (Historical)   NIL     HPV 16 DNA NEG^Negative Negative   Negative    HPV 18 DNA NEG^Negative Negative   Negative    Other HR HPV NEG^Negative Negative   Negative      Reviewed and updated as needed this visit by clinical staff   Tobacco  Allergies  Meds  Problems             Reviewed and updated as needed this visit by Provider    Allergies  Meds  Problems                Review of Systems   Constitutional:  Negative for chills and fever.   HENT:  Negative for congestion, ear pain, hearing loss and sore throat.    Eyes:  Negative for pain and visual disturbance.   Respiratory:  Negative for cough and shortness of breath.    Cardiovascular:  Negative for chest pain, palpitations and peripheral edema.   Gastrointestinal:  Positive for diarrhea. Negative for abdominal pain, constipation, heartburn, hematochezia and nausea.   Breasts:  Negative for tenderness, breast mass and discharge.   Genitourinary:  Negative for dysuria, frequency, genital sores, hematuria, pelvic pain, urgency, vaginal bleeding and vaginal discharge.   Musculoskeletal:  Negative for arthralgias, joint swelling and myalgias.   Skin:  Negative for rash.   Neurological:  Positive for headaches. Negative for dizziness, weakness and paresthesias.   Psychiatric/Behavioral:  Negative for mood changes. The patient is not nervous/anxious.           OBJECTIVE:   /83   Pulse 83   Temp 97.2  F (36.2  C) (Tympanic)    "Resp 20   Ht 1.619 m (5' 3.75\")   Wt 99 kg (218 lb 3.2 oz)   LMP 05/25/2018 (Exact Date)   SpO2 99%   BMI 37.75 kg/m    Physical Exam  Constitutional:       Appearance: Normal appearance. She is not ill-appearing.   HENT:      Right Ear: Tympanic membrane, ear canal and external ear normal.      Left Ear: Tympanic membrane, ear canal and external ear normal.      Nose: Nose normal. No congestion.      Mouth/Throat:      Mouth: Mucous membranes are moist.      Pharynx: Oropharynx is clear.   Eyes:      Pupils: Pupils are equal, round, and reactive to light.   Cardiovascular:      Rate and Rhythm: Normal rate and regular rhythm.      Pulses: Normal pulses.      Heart sounds: Normal heart sounds. No murmur heard.     No friction rub. No gallop.   Pulmonary:      Effort: Pulmonary effort is normal.      Breath sounds: Normal breath sounds.   Abdominal:      General: Abdomen is flat. Bowel sounds are normal.      Palpations: Abdomen is soft.   Musculoskeletal:         General: Normal range of motion.      Cervical back: Normal range of motion.   Lymphadenopathy:      Cervical: No cervical adenopathy.   Skin:     General: Skin is warm and dry.   Neurological:      General: No focal deficit present.      Mental Status: She is alert and oriented to person, place, and time.   Psychiatric:         Mood and Affect: Mood normal.         Behavior: Behavior normal.         Thought Content: Thought content normal.         Judgment: Judgment normal.           Diagnostic Test Results:  Labs reviewed in Epic  Results for orders placed or performed in visit on 11/08/23 (from the past 24 hour(s))   Hemoglobin A1c   Result Value Ref Range    Hemoglobin A1C 5.7 (H) 0.0 - 5.6 %       ASSESSMENT/PLAN:   Mirian was seen today for physical.    Diagnoses and all orders for this visit:    Routine general medical examination at a health care facility  - REVIEW OF HEALTH MAINTENANCE PROTOCOL ORDERS  - PRIMARY CARE FOLLOW-UP SCHEDULING; " "Future      Gastroesophageal reflux disease without esophagitis  -   stable on PPI    omeprazole (PRILOSEC) 40 MG DR capsule; Take 1 capsule (40 mg) by mouth daily    Chronic left-sided low back pain, unspecified whether sciatica present  -    Rare intermittent use of cyclobenzaprine as needed. No acute concerns.    cyclobenzaprine (FLEXERIL) 10 MG tablet; Take 0.5-1 tablets (5-10 mg) by mouth 2 times daily as needed for muscle spasms    Hyperlipidemia LDL goal <130  -     atorvastatin (LIPITOR) 40 MG tablet; TAKE 1 TABLET DAILY  -     Lipid panel reflex to direct LDL Non-fasting    Screening for diabetes mellitus  -     Basic metabolic panel  -     Hemoglobin A1c    Encounter for vitamin deficiency screening  -not currently taking supplemental therapy as she had taken it before and didn't observe an improvement in her vitamin D levels.   -     Vitamin D Deficiency    Class 2 severe obesity due to excess calories with serious comorbidity in adult, unspecified BMI (H)  -comorbidity of GERD, low back pain, hyperlipidemia    Need for vaccination    - COVID-19 12+ (2023-24) (PFIZER)  - INFLUENZA VACCINE >6 MONTHS (AFLURIA/FLUZONE)                Patient has been advised of split billing requirements and indicates understanding: Yes      COUNSELING:  Reviewed preventive health counseling, as reflected in patient instructions       Regular exercise       Healthy diet/nutrition       Osteoporosis prevention/bone health      BMI:   Estimated body mass index is 37.75 kg/m  as calculated from the following:    Height as of this encounter: 1.619 m (5' 3.75\").    Weight as of this encounter: 99 kg (218 lb 3.2 oz).   Weight management plan: Discussed healthy diet and exercise guidelines      She reports that she quit smoking about 5 years ago. Her smoking use included cigarettes. She has a 15.00 pack-year smoking history. She has never used smokeless tobacco.          MARIANO Soliz [facility] " [place]

## 2023-11-08 NOTE — NURSING NOTE
Prior to immunization administration, verified patients identity using patient s name and date of birth. Please see Immunization Activity for additional information.     Screening Questionnaire for Adult Immunization    Are you sick today?   No   Do you have allergies to medications, food, a vaccine component or latex?   No   Have you ever had a serious reaction after receiving a vaccination?   No   Do you have a long-term health problem with heart, lung, kidney, or metabolic disease (e.g., diabetes), asthma, a blood disorder, no spleen, complement component deficiency, a cochlear implant, or a spinal fluid leak?  Are you on long-term aspirin therapy?   No   Do you have cancer, leukemia, HIV/AIDS, or any other immune system problem?   No   Do you have a parent, brother, or sister with an immune system problem?   No   In the past 3 months, have you taken medications that affect  your immune system, such as prednisone, other steroids, or anticancer drugs; drugs for the treatment of rheumatoid arthritis, Crohn s disease, or psoriasis; or have you had radiation treatments?   No   Have you had a seizure, or a brain or other nervous system problem?   No   During the past year, have you received a transfusion of blood or blood    products, or been given immune (gamma) globulin or antiviral drug?   No   For women: Are you pregnant or is there a chance you could become       pregnant during the next month?   No   Have you received any vaccinations in the past 4 weeks?   No     Immunization questionnaire answers were all negative.      Patient instructed to remain in clinic for 15 minutes afterwards, and to report any adverse reactions.     Screening performed by PRATIMA GLYNN MA on 11/8/2023 at 3:35 PM.

## 2023-11-09 ENCOUNTER — PATIENT OUTREACH (OUTPATIENT)
Dept: CARE COORDINATION | Facility: CLINIC | Age: 58
End: 2023-11-09
Payer: COMMERCIAL

## 2023-11-09 LAB
ANION GAP SERPL CALCULATED.3IONS-SCNC: 12 MMOL/L (ref 7–15)
BUN SERPL-MCNC: 11.5 MG/DL (ref 6–20)
CALCIUM SERPL-MCNC: 9.4 MG/DL (ref 8.6–10)
CHLORIDE SERPL-SCNC: 100 MMOL/L (ref 98–107)
CHOLEST SERPL-MCNC: 192 MG/DL
CREAT SERPL-MCNC: 0.75 MG/DL (ref 0.51–0.95)
DEPRECATED HCO3 PLAS-SCNC: 28 MMOL/L (ref 22–29)
EGFRCR SERPLBLD CKD-EPI 2021: >90 ML/MIN/1.73M2
GLUCOSE SERPL-MCNC: 91 MG/DL (ref 70–99)
HDLC SERPL-MCNC: 41 MG/DL
LDLC SERPL CALC-MCNC: 120 MG/DL
NONHDLC SERPL-MCNC: 151 MG/DL
POTASSIUM SERPL-SCNC: 4 MMOL/L (ref 3.4–5.3)
SODIUM SERPL-SCNC: 140 MMOL/L (ref 135–145)
TRIGL SERPL-MCNC: 154 MG/DL
VIT D+METAB SERPL-MCNC: 14 NG/ML (ref 20–50)

## 2023-11-14 ENCOUNTER — MYC MEDICAL ADVICE (OUTPATIENT)
Dept: FAMILY MEDICINE | Facility: CLINIC | Age: 58
End: 2023-11-14
Payer: COMMERCIAL

## 2023-11-22 ENCOUNTER — TRANSFERRED RECORDS (OUTPATIENT)
Dept: HEALTH INFORMATION MANAGEMENT | Facility: CLINIC | Age: 58
End: 2023-11-22
Payer: COMMERCIAL

## 2023-11-22 LAB — TSH SERPL-ACNC: 2.82 UIU/ML (ref 0.45–4.5)

## 2023-12-14 ENCOUNTER — ANCILLARY PROCEDURE (OUTPATIENT)
Dept: MAMMOGRAPHY | Facility: CLINIC | Age: 58
End: 2023-12-14
Payer: COMMERCIAL

## 2023-12-14 DIAGNOSIS — Z12.31 VISIT FOR SCREENING MAMMOGRAM: ICD-10-CM

## 2023-12-14 DIAGNOSIS — K21.9 GASTROESOPHAGEAL REFLUX DISEASE WITHOUT ESOPHAGITIS: ICD-10-CM

## 2023-12-14 PROCEDURE — 77063 BREAST TOMOSYNTHESIS BI: CPT | Mod: TC | Performed by: RADIOLOGY

## 2023-12-14 PROCEDURE — 77067 SCR MAMMO BI INCL CAD: CPT | Mod: TC | Performed by: RADIOLOGY

## 2023-12-14 RX ORDER — OMEPRAZOLE 40 MG/1
40 CAPSULE, DELAYED RELEASE ORAL DAILY
Qty: 30 CAPSULE | Refills: 0 | Status: SHIPPED | OUTPATIENT
Start: 2023-12-14 | End: 2024-01-02

## 2024-01-02 DIAGNOSIS — K21.9 GASTROESOPHAGEAL REFLUX DISEASE WITHOUT ESOPHAGITIS: ICD-10-CM

## 2024-01-02 RX ORDER — OMEPRAZOLE 40 MG/1
40 CAPSULE, DELAYED RELEASE ORAL DAILY
Qty: 30 CAPSULE | Refills: 1 | Status: SHIPPED | OUTPATIENT
Start: 2024-01-02 | End: 2024-03-14

## 2024-02-06 NOTE — PATIENT INSTRUCTIONS
If you have any questions regarding your visit, Please contact your care team.    [email or web address]: [phone]      Women s Health CLINIC HOURS TELEPHONE NUMBER   Christina Barnett DO.    [place]-Surgery Scheduler  [place] - Surgery Scheduler    NERY Montelongo, NERY Peters RN     Monday, Thursday  [place]  7am-3pm    Tuesday, Wednesday  [place]  7am-3pm    Friday  [place]  1pm-3:30pm    Typical Surgery Days: Thursday or Friday   [facility]  [address]  [place], [place] [address] [phone] Phone  [phone] Fax    [facility]  [address] [place] [address] [phone] Phone    Imaging [phone] Phone    [facility]:  [phone] Phone     **Surgeries** Our Surgery Schedulers will contact you to schedule. If you do not receive a call within 3 business days, please call [phone].    Urgent Care locations:  [facility] Saturday and    9 am - 5 pm    Monday-Friday   12 pm - 8 pm  Saturday and    9 am - 5 pm   [phone]       If you need a medication refill, please contact your pharmacy. Please allow 3 business days for your refill to be completed.  As always, Thank you for trusting us with your healthcare needs!        [email or web address]  North American Menopause Society (NAMS)      Non-hormonal therapy -  Paxil, Lexapro, Veozah, Gabapentin

## 2024-02-13 ENCOUNTER — OFFICE VISIT (OUTPATIENT)
Dept: OBGYN | Facility: CLINIC | Age: 59
End: 2024-02-13
Payer: COMMERCIAL

## 2024-02-13 VITALS
BODY MASS INDEX: 36.74 KG/M2 | HEART RATE: 80 BPM | WEIGHT: 212.4 LBS | DIASTOLIC BLOOD PRESSURE: 79 MMHG | SYSTOLIC BLOOD PRESSURE: 129 MMHG

## 2024-02-13 DIAGNOSIS — Z80.3 FAMILY HISTORY OF MALIGNANT NEOPLASM OF BREAST: ICD-10-CM

## 2024-02-13 DIAGNOSIS — Z80.41 FAMILY HISTORY OF MALIGNANT NEOPLASM OF OVARY: ICD-10-CM

## 2024-02-13 DIAGNOSIS — N95.1 MENOPAUSAL SYNDROME (HOT FLASHES): Primary | ICD-10-CM

## 2024-02-13 DIAGNOSIS — L90.0 LICHEN SCLEROSUS: ICD-10-CM

## 2024-02-13 DIAGNOSIS — G47.00 INSOMNIA, UNSPECIFIED TYPE: ICD-10-CM

## 2024-02-13 PROCEDURE — 99204 OFFICE O/P NEW MOD 45 MIN: CPT | Performed by: OBSTETRICS & GYNECOLOGY

## 2024-02-13 RX ORDER — ESCITALOPRAM OXALATE 5 MG/1
10 TABLET ORAL DAILY
Qty: 30 TABLET | Refills: 0 | Status: SHIPPED | OUTPATIENT
Start: 2024-02-13 | End: 2024-03-12

## 2024-02-13 NOTE — PROGRESS NOTES
"  SUBJECTIVE:       HPI: Christina Guzman is a 58 year old  who presents today for HRT/menopausal symptoms. She complains of hot flashes, insomnia, hair thinning, decreased libido, weight gain, moodiness, irritability, and GI issues. Has of note had extensive GI work-up.  Patient's last menstrual period was 2018 (exact date).. Has previously been treated for depression but stopped her medications and feels well (wellbutrin).   Has not been on HRT previously other than local estrace in the vagina.  Has tried changing sleep hygiene habits, no technology in the room, etc. Wakes at 3am most days. Has been taking melatonin.  Has tried intermittent fasting with some improvement in some symptoms, especially GI.    Menopause- 56yo  Menarche - 14y0.  First child- 23yo  \"10.8% lifetime risk of developing breast cancer based on the DENIZ model. \"      HRT contraindications:  History breast cancer - No  CHD - No  Prior thromboembolic event or stroke - TIA  Active liver disease - No  Unexplained vaginal bleeding - No  High-risk endometrial cancer - No        Ob Hx:  s/p   OB History    Para Term  AB Living   3 2 2 0 1 2   SAB IAB Ectopic Multiple Live Births   1 0 0 0 2      # Outcome Date GA Lbr Jack/2nd Weight Sex Delivery Anes PTL Lv   3 Term  40w0d   F Vag-Spont   RIKA   2 SAB            1 Term  40w0d       RIKA    .      Gyn Hx: Patient's last menstrual period was 2018 (exact date).    Patient is sexually active. One male partner  Personal history of lichen sclerosis- treated with clobetasol   Last pap - 10/2020 nil neg hpv   Last Mammogram 2023 birads 1  Colon Screening 2023 +diverticulosis, neg path  Family history of gyn-related malignancies: + breast and ovarian cancer. S/p personal testing negative for BRCA, other common mutations. More details in oncology notes.         reports that she quit smoking about 6 years ago. Her smoking use included cigarettes. She " has a 15 pack-year smoking history. She has never used smokeless tobacco.      Today's PHQ-2 Score:       2024     7:38 AM   PHQ-2 (  [employer])   Q1: Little interest or pleasure in doing things 0   Q2: Feeling down, depressed or hopeless 0   PHQ-2 Score 0   Q1: Little interest or pleasure in doing things Not at all   Q2: Feeling down, depressed or hopeless Not at all   PHQ-2 Score 0     Today's PHQ-9 Score:       2022     8:15 AM   PHQ-9 SCORE   PHQ-9 Total Score 8     Today's CAROLINE-7 Score:       2022     8:15 AM   CAROLINE-7 SCORE   Total Score 2       Problem list and histories reviewed & adjusted, as indicated.  Additional history: as documented.    Patient Active Problem List   Diagnosis    Chronic idiopathic urticaria    House dust mite allergy    Migraine headache    Low back pain    Menopausal depression    Menopausal syndrome    Vitamin D deficiency    GERD (gastroesophageal reflux disease)    Toe pain    Skin tag    Low back pain, unspecified back pain laterality, unspecified chronicity, with sciatica presence unspecified    H/O carotid endarterectomy    TIA (transient ischemic attack)    Advanced directives, counseling/discussion    BMI 34.0-34.9,adult    Vaginal dryness    Lichen sclerosus    Diverticulosis of large intestine without hemorrhage    Class 2 severe obesity due to excess calories with serious comorbidity in adult (H)     Past Surgical History:   Procedure Laterality Date    CAROTID ENDARTERECTOMY  2018    ENT SURGERY  2017    Oral surgery    ORTHOPEDIC SURGERY  2014    bone spur shaved from left big toe    TOE SURGERY      Left foot    ZZC ORAL SURGERY PROCEDURE  2017    Cyst removed from abscess in tooth      Social History     Tobacco Use    Smoking status: Former     Packs/day: 0.50     Years: 30.00     Additional pack years: 0.00     Total pack years: 15.00     Types: Cigarettes     Quit date:      Years since quittin.1    Smokeless tobacco: Never    Substance Use Topics    Alcohol use: No     Comment: occ      Problem (# of Occurrences) Relation (Name,Age of Onset)    Cancer (2) Mother (Nimisha Gustafson): OVARIAN,  age 46,48, Father (Thien Gustafson): Lung    Diabetes (4) Maternal Grandmother (Dominga Chen), Maternal Grandfather (Adan Chen), Paternal Grandmother, Paternal Grandfather (Derek Gustafson)    Hypertension (1) Father (Thien Gustafson)    Respiratory (1) Father (Thien Gustafson)    Obesity (1) Father (Thien Gustafson)    Breast Cancer (1) Maternal Grandmother (Dominga Chen)    Other Cancer (1) Mother (Nimisha Gustafson): Ovarian    Hyperlipidemia (1) Father (Thien Gustafson)    Coronary Artery Disease (1) Paternal Grandfather (Derek Gustafson)              atorvastatin (LIPITOR) 40 MG tablet, TAKE 1 TABLET DAILY  Blood Pressure Monitoring (BLOOD PRESSURE DIGITAL SOLN) KIT,   clobetasol (TEMOVATE) 0.05 % external ointment, Apply topically 2 times daily  cyclobenzaprine (FLEXERIL) 10 MG tablet, Take 0.5-1 tablets (5-10 mg) by mouth 2 times daily as needed for muscle spasms  EPINEPHrine (ANY BX GENERIC EQUIV) 0.3 MG/0.3ML injection 2-pack, INJECT 0.3 MLS (0.3 MG) INTO THE MUSCLE ONCE AS NEEDED FOR ANAPHYLAXIS  estradiol (ESTRACE) 0.1 MG/GM vaginal cream, PLACE 2 GRAMS VAGINALLY 2  TIMES A WEEK  omeprazole (PRILOSEC) 40 MG DR capsule, Take 1 capsule (40 mg) by mouth daily  spacer ([facility]BBIE) holding chamber, Use with inhaler  triamcinolone (KENALOG) 0.1 % external cream, Apply topically 2 times daily    No current facility-administered medications on file prior to visit.    Allergies   Allergen Reactions    Diatrizoate Hives     Pt reports 30 years ago    Iodinated Contrast Media Hives    Adhesive Tape Other (See Comments)     Burns/red lines/skin reaction    Clindamycin        ROS:  10 Point review of systems negative other noted above in HPI    OBJECTIVE:     /79   Pulse 80   Wt 96.3 kg (212 lb 6.4 oz)   LMP 2018 (Exact Date)   BMI  36.74 kg/m    Body mass index is 36.74 kg/m .      Gen: Alert, oriented, appropriately interactive, NAD  Chest: Symmetrical, unlabored breathing  Abdomen: soft, non tender, non distended, no masses, no hernias. No inguinal lymphadenopathy.   External genitalia: no lesions; normal appearing external genitalia, bartholins glands, urethra, skenes glands  Vagina: no masses or lesions or discharge, atrophic  Cervix: no masses or lesions or discharge   Bimanual exam:   Nontender pelvic floor muscles  Urethra: nontender   Bladder: nontender and without massess, well supported   Uterus: midline, anteverted, small, mobile  no masses, non-tender  Adnexa: no masses or tenderness appreciated   No cervical motion tenderness  MSK: normal gait, symmetric movements UE & LE  Lower extremities: non-tender, no edema      In-Clinic Test Results:  No results found for this or any previous visit (from the past 24 hour(s)).  Component      Latest Ref Rng 9/22/2022  8:12 AM 11/8/2023  3:40 PM   Sodium      135 - 145 mmol/L 142  140    Potassium      3.4 - 5.3 mmol/L 3.5     Chloride      94 - 109 mmol/L 106     Carbon Dioxide      20 - 32 mmol/L 33 (H)     Anion Gap      7 - 15 mmol/L 3  12    Urea Nitrogen      7 - 30 mg/dL 11     Creatinine      0.51 - 0.95 mg/dL 0.96  0.75    Calcium      8.6 - 10.0 mg/dL 8.7  9.4    Glucose      70 - 99 mg/dL 100 (H)     Alkaline Phosphatase      40 - 150 U/L 102     AST      0 - 45 U/L 17     ALT      0 - 50 U/L 28     Protein Total      6.8 - 8.8 g/dL 7.1     Albumin      3.4 - 5.0 g/dL 3.7     Bilirubin Total      0.2 - 1.3 mg/dL 0.8     GFR Estimate      >60 mL/min/1.73m2 69  >90    Potassium      3.4 - 5.3 mmol/L  4.0    Chloride      98 - 107 mmol/L  100    Carbon Dioxide (CO2)      22 - 29 mmol/L  28    Urea Nitrogen      6.0 - 20.0 mg/dL  11.5    Glucose      70 - 99 mg/dL  91    Cholesterol      <200 mg/dL  192    Triglycerides      <150 mg/dL  154 (H)    HDL Cholesterol      >=50 mg/dL  41  (L)    LDL Cholesterol Calculated      <=100 mg/dL  120 (H)    Non HDL Cholesterol      <130 mg/dL  151 (H)    Hemoglobin A1C      0.0 - 5.6 %  5.7 (H)    Vitamin D, Total (25-Hydroxy)      20 - 50 ng/mL  14 (L)       Legend:  (H) High  (L) Low  EXAM: ULTRASOUND ABDOMEN LIMITED GALLBLADDER   LOCATION: [facility]   DATE: 2023     INDICATION: Gas bloat syndrome. Intermittent diarrhea.   COMPARISON: CT dated 2021.   TECHNIQUE: Limited abdominal ultrasound.     FINDINGS:     GALLBLADDER: Normal. No gallstones, wall thickening, or pericholecystic fluid. Negative sonographic Cannon's sign.     BILE DUCTS: No biliary dilatation. The common duct measures 4 mm.     LIVER: Normal parenchyma with smooth contour. Two simple cysts right hepatic lobe measuring 1.3 and 0.9 cm.     RIGHT KIDNEY: No hydronephrosis.     PANCREAS: The visualized portions are normal.     No ascites. Ectasia infrarenal abdominal aorta measuring 2.7 cm per   Impression    1.  Two simple cysts right hepatic lobe require no further evaluation.  2.  Ectasia infrarenal abdominal aorta measuring 2.7 cm.    Recommended intervals for initial follow-up imaging of ectatic aortas and abdominal aortic aneurysms    Aortic Diameter (cm)     Imaging Interval  2.5-2.9                          5 year  3.0-3.4                          3 year  3.5-3.9                          2 year  4.0-4.4                         1 year  4.5-4.9                         6 month *  5.0-5.5                         3-6 month *    *In addition to planning follow-up imaging, one should also consider surgical or endovascular referral.    ASSESSMENT/PLAN:                                                      Christina Guzman is a 58 year old  who presents today to discuss HRT/menopausal symptoms      ICD-10-CM    1. Menopausal syndrome (hot flashes)  N95.1 escitalopram (LEXAPRO) 5 MG tablet      2. Lichen sclerosus  L90.0       3. Family history of malignant neoplasm of  breast  Z80.3       4. Family history of malignant neoplasm of ovary  Z80.41       5. Insomnia, unspecified type  G47.00 Adult Sleep Eval & Management  Referral      6. BMI 36.0-36.9,adult  Z68.36 Adult Comprehensive Weight Management  Referral        Menopausal symptoms, with mild vasomotor symptoms present and multiple other symptoms possibly not consistent with menopause present.  Options for management of menopausal symptoms reviewed, including conservative, hormonal and non-hormonal.   Contraindications to MHT include a history of breast cancer, CHD, a previous venous thromboembolic event or stroke, active liver disease, unexplained vaginal bleeding, high-risk endometrial cancer, or transient ischemic attack. She unfortunately has a history of TIA and high risk of breast cancer due to family history.     After discussing her options, she has elected to try Lexapro at this time. Rx sent. RTO 4 weeks to discuss how new medication is going and if she needs to go up on dosing. SE reviewed. All questions answered.    For insomnia, recommend sleep study given that she has already tried improving sleep hygiene. Order placed.    For weight management, we discussed nutritional and exercise changes in addition to medication options. Recommend referral to weight management to discuss further. Order placed.      I have personally reviewed labs +3, her last Primary care provider note, last gyn CNP note from outside facility, genetic counseling notes, her colonoscopy screening, abdominal ultrasound and recent mammogram reports.    50 minutes spent on the date of the encounter doing chart review, history and exam, documentation and further activities as noted above       Christina Barnett DO  [facility]

## 2024-02-25 DIAGNOSIS — N95.1 MENOPAUSAL SYNDROME (HOT FLASHES): ICD-10-CM

## 2024-02-25 RX ORDER — ESCITALOPRAM OXALATE 5 MG/1
10 TABLET ORAL DAILY
Qty: 30 TABLET | Refills: 0 | Status: CANCELLED | OUTPATIENT
Start: 2024-02-25

## 2024-02-26 NOTE — TELEPHONE ENCOUNTER
Requested Prescriptions   Pending Prescriptions Disp Refills    escitalopram (LEXAPRO) 5 MG tablet 30 tablet 0     Sig: Take 2 tablets (10 mg) by mouth daily       SSRIs Protocol Passed - 2/25/2024  7:30 PM        Passed - Recent (12 mo) or future (30 days) visit within the authorizing provider's specialty     The patient must have completed an in-person or virtual visit within the past 12 months or has a future visit scheduled within the next 90 days with the authorizing provider s specialty.  Urgent care and e-visits do not quality as an office visit for this protocol.          Passed - Medication is active on med list        Passed - Patient is age 18 or older        Passed - No active pregnancy on record        Passed - No positive pregnancy test in last 12 months           Pt last seen 2/13/2024 for menopausal symptoms    Last prescribed 2/13/2024 for 30 tablets with 0 refills    Pt has appt on 3/12/2024 to follow up- pt has medication to get to this appt.    Lorraine Bañuelos RN on 2/26/2024 at 11:38 AM

## 2024-03-14 DIAGNOSIS — K21.9 GASTROESOPHAGEAL REFLUX DISEASE WITHOUT ESOPHAGITIS: ICD-10-CM

## 2024-03-15 RX ORDER — OMEPRAZOLE 40 MG/1
40 CAPSULE, DELAYED RELEASE ORAL DAILY
Qty: 30 CAPSULE | Refills: 1 | Status: SHIPPED | OUTPATIENT
Start: 2024-03-15 | End: 2024-05-06

## 2024-03-22 ENCOUNTER — TELEPHONE (OUTPATIENT)
Dept: OBGYN | Facility: CLINIC | Age: 59
End: 2024-03-22
Payer: COMMERCIAL

## 2024-03-22 NOTE — TELEPHONE ENCOUNTER
Calling [facility] re: fax that was sent over   Requesting a dose modification with sig: take 10 mg once daily.     Spoke with [facility] representative who states the issue has been resolved and med was mailed to the patient on 3/20/2024    Ange GONZALES RN

## 2024-03-22 NOTE — TELEPHONE ENCOUNTER
Received dose modification fax from [facility]. Patient had VV with Dr. Barnett on 3/12 and refills sent in for Escitalopram - take 2 tablets (10mg) by mouth daily for 90 days.  Per [facility], they state to consider a simplified regimen of 10mg once daily. Adherence may improve & costs may be reduced - If patient not currently in titration period.  Routing to nurses to review/advise on how to proceed.    Mary Paris MA  3/22/2024 9:12 AM

## 2024-03-25 ENCOUNTER — VIRTUAL VISIT (OUTPATIENT)
Dept: ENDOCRINOLOGY | Facility: CLINIC | Age: 59
End: 2024-03-25
Payer: COMMERCIAL

## 2024-03-25 ENCOUNTER — TELEPHONE (OUTPATIENT)
Dept: ENDOCRINOLOGY | Facility: CLINIC | Age: 59
End: 2024-03-25

## 2024-03-25 VITALS — HEIGHT: 64 IN | BODY MASS INDEX: 36.54 KG/M2 | WEIGHT: 214 LBS

## 2024-03-25 DIAGNOSIS — E66.9 OBESITY: Primary | ICD-10-CM

## 2024-03-25 DIAGNOSIS — Z71.3 NUTRITIONAL COUNSELING: ICD-10-CM

## 2024-03-25 PROCEDURE — 97802 MEDICAL NUTRITION INDIV IN: CPT | Mod: 95 | Performed by: DIETITIAN, REGISTERED

## 2024-03-25 PROCEDURE — 99204 OFFICE O/P NEW MOD 45 MIN: CPT | Mod: 95 | Performed by: INTERNAL MEDICINE

## 2024-03-25 PROCEDURE — 99207 PR NO CHARGE LOS: CPT | Mod: 95 | Performed by: DIETITIAN, REGISTERED

## 2024-03-25 ASSESSMENT — PAIN SCALES - GENERAL
PAINLEVEL: NO PAIN (0)
PAINLEVEL: NO PAIN (0)
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
## 2024-03-25 NOTE — TELEPHONE ENCOUNTER
Prior Authorization Approval    Medication: ZEPBOUND 2.5 MG/0.5ML SC SOAJ  Authorization Effective Date: 3/25/2024  Authorization Expiration Date: 11/25/2024  Approved Dose/Quantity: 2/28 days   Reference #: [id number]   Insurance Company: [employer] - Phone [phone] Fax [phone]  Expected CoPay: $ 795.07  CoPay Card Available:      Financial Assistance Needed: no  Which Pharmacy is filling the prescription:    Pharmacy Notified: no  Patient Notified: pharmacy will notify patient

## 2024-03-25 NOTE — NURSING NOTE
Is the patient currently in the state of [place]? YES    Visit mode:VIDEO    If the visit is dropped, the patient can be reconnected by: VIDEO VISIT: Text to cell phone:   Telephone Information:   Mobile [phone]    and VIDEO VISIT: Send to e-mail at: [email or web address]    Will anyone else be joining the visit? NO  (If patient encounters technical issues they should call [phone])    How would you like to obtain your AVS? MyChart    Are changes needed to the allergy or medication list? No    Reason for visit: Consult    Natalie RANDOLPH

## 2024-03-25 NOTE — PROGRESS NOTES
Virtual Visit Details    Type of service:  Video Visit     Originating Location (pt. Location): Home    Distant Location (provider location):  Off-site  Platform used for Video Visit: Laura

## 2024-03-25 NOTE — NURSING NOTE
Is the patient currently in the state of [place]? YES    Visit mode:VIDEO    If the visit is dropped, the patient can be reconnected by: VIDEO VISIT: Text to cell phone:   Telephone Information:   Mobile [phone]    and VIDEO VISIT: Send to e-mail at: [email or web address]    Will anyone else be joining the visit? NO  (If patient encounters technical issues they should call [phone])    How would you like to obtain your AVS? MyChart    Are changes needed to the allergy or medication list? N/A    Reason for visit: Consult    Natalie RANDOLPH

## 2024-03-25 NOTE — PATIENT INSTRUCTIONS
Coy Vela,     Follow-up with RD in 1 month.     Thank you,    Naya Watson, RD, LD  If you would like to schedule or reschedule an appointment with the RD, please call [phone]    Nutrition Goals  1) Ensure your consuming 60+ gm protein daily (30 gm protein, twice daily)        The Plate Method:  Plate method can be used for general guidance on balanced meals/portion sizes (see link below for picture/more information)                 Make 1/2 your plate non starchy vegetables (cauliflower, broccoli, asparagus, Dorothy sprouts, lettuce, carrots, for example).                3+ oz lean protein sources (salmon/skinless chicken/turkey breast/pork loin/lean cuts of beef/ or non-animal protein such as black, kidney or welch beans, tofu/edamame/tempeh)    (Note: 3 oz = deck of cards size)                1/2 to 1 cup carbohydrate choices such as whole grain starches or starchy vegetables or fruit. For example: quinoa, brown rice, barley, potatoes, sweet potatoes, winter squash, peas, corn, or fruit.                Choose ~0-2 added fat servings at a meal (avocados, nut butters, nuts or seeds, olive oil, vegetable oils).    https://[email or web address]    https://www.cdc.gov/diabetes/images/managing/Diabetes-Manage-Eat-Well-Plate-Graphic_600px.jpg    Protein Sources   http://[email or web address] [email or web address].[email or web address]     Carbohydrates  [email or web address]     Mindful Eating  http://[email or web address] [email or web address]     Summary of Volumetrics Eating Plan  [email or web address]     Meal Replacement Shake Options:   *Protein Shake Criteria: no more than 210 Calories, at least 20 grams of protein, and less than 10 grams of sugar   Premier Protein (160 Calories, 30 g protein)  Slim Fast Advanced Nutrition (180 Calories, 20 g protein)  Muscle Milk, lactose-free, 17 oz bottle (210 Calories, 30 g protein)  Integrated Supplements, no artificial sugars (110 Calories, 20 g protein)  Boost/Ensure Max (160 calories, 30 gm protein)   [facility]  Protein Shakes (160-230 calories, 26-42 gm protein)  Aldi's Elevation Protein Powder (180 calories, 30 gm protein)   Orgain Protein Shakes (130-160 calories, 20-26 gm protein)       COMPREHENSIVE WEIGHT MANAGEMENT PROGRAM  VIRTUAL SUPPORT GROUPS    At [facility], our Comprehensive Weight Management program offers on-line support groups for patients who are working on weight loss and considering, preparing for, or have had weight loss surgery.     There is no cost for this opportunity.  You are invited to attend the?Virtual Support Groups?provided by any of the following locations:    [facility] via Microsoft Teams with Mariola Flores RN  2.   [place] via Zazengo with Bob Perez, PhD, LP  3.   [place] via Zazengo with Jessica Rock RN  4.   [facility] via a Zoom Meeting with JA Talley    The following Support Group information can also be found on our website:  [email or web address]      [facility] Weight Loss Surgery Support Group  The support group is a patient-lead forum that meets monthly to share experiences, encouragement and education. It is open to those who have had weight loss surgery, are scheduled for surgery, or are considering surgery.   WHEN: This group meets on the 3rd Wednesday of each month from 5:00PM - 6:00PM virtually using Microsoft Teams.   FACILITATOR: Led by Mariola Maradiaga, RD, LD, RN, the program's Clinical Coordinator.   TO REGISTER: Please contact the clinic via [employer] or call the nurse line directly at [phone] to inform our staff that you would like an invite sent to you and to let us know the email you would like the invite sent to. Prior to the meeting, a link with directions on how to join the meeting will be sent to you.    2023 and 2024 Meetings   December 20  January 17  February 21  March 20  April 17  May 15  Roseann 19      [facility]  "[facility] Bariatric [facility] Support Group?  This is open to all pre- and post- operative bariatric surgery patients as well as their support system.   WHEN: This group meets the 3rd Tuesday of each month from 6:30 PM - 8:00 PM virtually using Microsoft Teams.   FACILITATOR: Led by Bob Perez, Ph.D who is a Licensed Psychologist with the [facility] Comprehensive Weight Management Program.   TO REGISTER: Please send an email to Bob Perez, Ph.D.,  at?[email or web address]?if you would like an invitation to the group. Prior to the meeting, a link with directions on how to join the meeting will be sent to you.    2023 and 2024 Meetings  December 19 January 16: \"Medication Management and Bariatric Surgery\", Barby Scanlon, PharmD, Pharmacy Resident at [facility]  February 20: \"A Bariatric Surgery Patient's Perspective\", ORESTES Sanabria, [facility], Behavioral Health Clinician at [facility]  March 19  April 16  May 21  Roseann 18: \"Nutritional Labeling\", Dietitian speaker to be announced.  November 19: \"Holiday Eating\", Dietitian speaker to be announced.    [facility] Post-Operative Bariatric Surgery Support Group  This is a support group for [facility] bariatric patients (and those external to [facility]) who have had bariatric surgery and are at least 3 months post-surgery.  WHEN: This support group meets the 4th Wednesday of the month from 11:00 AM - 12:00 PM virtually using Microsoft Teams.   FACILITATOR: Led by Certified Bariatric Nurse, Jessica Rock RN.   TO REGISTER: Please send an email to Jessica at [email or web address] if you would like an invitation to the group.  Prior to the meeting, a link with directions on how to join the meeting will be sent to you.    2023 and 2024 Meetings  December 27 January 24 February 28 March 27 April " "24  May 22  Roseann 26    [facility] Healthy Lifestyle Group?  This is a 60 minute virtual coaching group for those who want to lead a healthier lifestyle. Come together to set goals and overcome barriers in a supportive group environment. We will address the four pillars of health: nutrition, exercise, sleep and emotional well-being.  This group is highly recommended for those who are participating in the 24 week Healthy Lifestyle Plan and our Health Coaching sessions.  WHEN: This group meets the 1st Friday of the month, 12:30 PM - 1:30 PM online, via a zoom meeting.    FACILITATOR: Led by National Board Certified Health and , Jessica Jordan, [facility].   TO REGISTER: Please call the Call Center at [phone] to register.  You will get an appointment to attend [place]. Fifteen minutes prior to the meeting, complete the e-check in and you will get the link to join the meeting.    There is no charge to attend this group and space is limited.     2023 and 2024 Meetings  December 1: \"Let's Talk\" (guided discussion on our wins and challenges)  January 5: \"New Years Vision: Manifest your Best 2024!\" (guided imagery,  journaling and discussion)  February 2: \"Let's Talk\"  March 1: \"10 Percent Happier\" by Alexx Coreas (Book Bites - a guided discussion on the nuggets of wisdom from favorite wellness books, no need to read the book but highly encouraged)  April 5: \"Let's Talk\"  May 3: \"Essentialism: The Disciplined Pursuit of Less\" by Sampson Morrison (Book Bites discussion)  June 7: \"Let's Talk\"  July 5: NO MEETING, off for the 4th of July Holiday  August 2: \"The Blue Zones, Secrets for Living a Longer Life\" by Alexx Dodge (Book Bites discussion)                    "
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
## 2024-03-25 NOTE — PROGRESS NOTES
"Video-Visit Details    Type of service:  Video Visit    Video Start Time: 1:00 PM   Video End Time: 1:32 PM    Originating Location (pt. Location): Home    Distant Location (provider location):  Offsite (providers home) [facility] WE[facility] MANAGEMENT CLIN[facility] [place]     Platform used for Video Visit: [employer]      New Weight Management Nutrition Consultation    Christina Guzman is a 58 year old female presents today for new weight management nutrition consultation.  Patient referred by Dr. Cruz on March 25, 2024.    Patient with Co-morbidities of obesity including:      3/24/2024    10:06 AM   --   I have the following health issues associated with obesity Pre-Diabetes    High Cholesterol    GERD (Reflux)   I have the following symptoms associated with obesity Knee Pain    Back Pain    Fatigue    Hip Pain         Anthropometrics:  Estimated body mass index is 37.31 kg/m  as calculated from the following:    Height as of an earlier encounter on 3/25/24: 1.613 m (5' 3.5\").    Weight as of an earlier encounter on 3/25/24: 97.1 kg (214 lb).    Medications for Weight Loss:  Zepbound prescribed by MD today    NUTRITION HISTORY  Food allergies: None  Food intolerances: pork, sugary-processed foods, flavored latte - GI pain, having to rush to the bathroom. Has experienced for past 15 years, has a variety of tests, no significant diagnosis. Will go from being fine to severe gas pain and 1-2 hours later diarrhea. Seems to do better with full-fat dairy in terms of tolerance.   Vitamin/Mineral Supplements: vitamin D, fiber supplement    Previous methods of diet modification for weight loss: Feels like nothing she has tried has made a difference and after 3-4 weeks gives it up. Has tried IF, which she found helpful but not with weight loss. Jan-Feb - Once per week she would go 36 hours fasting but would not lose.  Typically does not eat 3 meals daily. Does not eat during the day if busy, doesn't want to get " sick.  Works a full time job, often 40+ hr/wk, little energy after work.   Preferences: loves abad     Recent Diet Recall:  Breakfast: skip  Lunch: skip or plain whole milk Greek yogurt with honey granola; strawberries/blueberries; nuts    Dinner: 5 pm - corn dogs; frozen enchiladas; spaghetti; tacos   Snacks: after dinner sweet - cake, little mychal   Beverages: Whole Milk (5 glasses per week), water (32 oz) or tea (occ with cream and stevia/honey). Occ coffee.    Dining Out/take-out: 3 nights per week - Tacos, Pasta, Burger, [place]           3/24/2024    10:06 AM   Diet Recall Review with Patient   If you do eat supper, what types of food do you typically eat? Something very quick or already prepared   If you do snack, what types of food do you typically eat? Something sweet, candy   How many glasses of juice do you drink in a typical day? 0   How many of glasses of milk do you drink in a typical day? 1   If you do drink milk, what type? Whole   How many 8oz glasses of sugar containing drinks such as Jero-Aid/sweet tea do you drink in a day? 0   How many cans/bottles of sugar pop/soda/tea/sports drinks do you drink in a day? 0   How many cans/bottles of diet pop/soda/tea or sports drink do you drink in a day? 0   How often do you have a drink of alcohol? Never           3/24/2024    10:06 AM   Eating Habits   Generally, my meals include foods like these bread, pasta, rice, potatoes, corn, crackers, sweet dessert, pop, or juice Everyday   Generally, my meals include foods like these fried meats, brats, burgers, french fries, pizza, cheese, chips, or ice cream A Few Times a Week   Eat fast food (like McDonalds, Burger Parker, Taco Bell) Less Than Weekly   Eat at a buffet or sit-down restaurant Once a Week   Eat most of my meals in front of the TV or computer Almost Everyday   Often skip meals, eat at random times, have no regular eating times Almost Everyday   Rarely sit down for a meal but snack or graze  throughout A Few Times a Week   Eat extra snacks between meals A Few Times a Week   Eat most of my food at the end of the day Almost Everyday   Eat in the middle of the night or wake up at night to eat Never   Eat extra snacks to prevent or correct low blood sugar Never   Eat to prevent acid reflux or stomach pain Never   Worry about not having enough food to eat Never   I eat when I am depressed Less Than Weekly   I eat when I am stressed Less Than Weekly   I eat when I am bored Almost Everyday   I eat when I am anxious Less Than Weekly   I eat when I am happy or as a reward A Few Times a Week   I feel hungry all the time even if I just have eaten Never   Feeling full is important to me Never   I finish all the food on my plate even if I am already full Never   I can't resist eating delicious food or walk past the good food/smell Less Than Weekly   I eat/snack without noticing that I am eating Less Than Weekly   I eat when I am preparing the meal A Few Times a Week   I eat more than usual when I see others eating Never   I have trouble not eating sweets, ice cream, cookies, or chips if they are around the house Everyday   I think about food all day Almost Everyday   What foods, if any, do you crave? Sweets/Candy/Chocolate           3/24/2024    10:06 AM   Amount of Food   I feel out of control when eating Never   I eat a large amount of food, like a loaf of bread, a box of cookies, a pint/quart of ice cream, all at once Never   I eat a large amount of food even when I am not hungry Never   I eat rapidly Never   I eat alone because I feel embarrassed and do not want others to see how much I have eaten Never   I eat until I am uncomfortably full Never   I feel bad, disgusted, or guilty after I overeat Monthly       Physical Activity:        3/24/2024    10:06 AM   Activity/Exercise History   How much of a typical 12 hour day do you spend sitting? Most of the Day   How much of a typical 12 hour day do you spend lying  down? Less Than Half the Day   How much of a typical day do you spend walking/standing? Less Than Half the Day   How many hours (not including work) do you spend on the TV/Video Games/Computer/Tablet/Phone? 6 Hours or More   How many times a week are you active for the purpose of exercise? Never   What keeps you from being more active? Lack of Time    Too tired   How many total minutes do you spend doing some activity for the purpose of exercising when you exercise? Less Than 15 Minutes       Nutrition Prescription  Recommended energy/nutrient modification.    Nutrition Diagnosis  Obesity r/t long history of positive energy balance aeb BMI >30.    Nutrition Intervention  Materials/education provided on hypocaloric diet for weight loss. Discussed Volumetric eating to help satiety level on fewer calories; portion control and healthy food choices (Plate Method and Volumetrics handouts), meal and snack planning tips and resources. Provided education on nutrition considerations when starting GLP1 medication including, changes in meal/snack volumes; meeting protein, hydration and micronutrient needs; limiting high-fat foods; and diet/lifestyle strategies for preventing constipation.  Patient demonstrates understanding.  Co-developed goals to work towards.   Provided pt with list of goals and resources below via [employer].     Expected Engagement: good  Follow-Up Plans: meal/snack planning     Nutrition Goals  1) Ensure your consuming 60+ gm protein daily (30 gm protein, twice daily)        The Plate Method:  Plate method can be used for general guidance on balanced meals/portion sizes (see link below for picture/more information)                 Make 1/2 your plate non starchy vegetables (cauliflower, broccoli, asparagus, [place] sprouts, lettuce, carrots, for example).                3+ oz lean protein sources (salmon/skinless chicken/turkey breast/pork loin/lean cuts of beef/ or non-animal protein such as black, kidney  or welch beans, tofu/edamame/tempeh)    (Note: 3 oz = deck of cards size)                1/2 to 1 cup carbohydrate choices such as whole grain starches or starchy vegetables or fruit. For example: quinoa, brown rice, barley, potatoes, sweet potatoes, winter squash, peas, corn, or fruit.                Choose ~0-2 added fat servings at a meal (avocados, nut butters, nuts or seeds, olive oil, vegetable oils).    [email or web address]    https://www.cdc.gov/diabetes/images/managing/Diabetes-Manage-Eat-Well-Plate-Graphic_600px.jpg    Protein Sources   [email or web address]     Carbohydrates  [email or web address]     Mindful Eating  [email or web address]     Summary of Volumetrics Eating Plan  [email or web address]     Meal Replacement Shake Options:   *Protein Shake Criteria: no more than 210 Calories, at least 20 grams of protein, and less than 10 grams of sugar   Premier Protein (160 Calories, 30 g protein)  Slim Fast Advanced Nutrition (180 Calories, 20 g protein)  Muscle Milk, lactose-free, 17 oz bottle (210 Calories, 30 g protein)  Integrated Supplements, no artificial sugars (110 Calories, 20 g protein)  Boost/Ensure Max (160 calories, 30 gm protein)   Fairlife Protein Shakes (160-230 calories, 26-42 gm protein)  Aldi's Elevation Protein Powder (180 calories, 30 gm protein)   Orgain Protein Shakes (130-160 calories, 20-26 gm protein)       Follow-Up:  1 month, PRN    Time spent with patient: 32 minutes.  aNya Watson, BEVERLY, LD

## 2024-03-25 NOTE — TELEPHONE ENCOUNTER
Left Voicemail (1st Attempt), sent myc for the patient to call back and schedule the following:    Appointment type: Return Med Wt Mgmt Nutrition (video)  Provider: Naya Watson  Return date: 1 month (end of April)  Specialty phone number: [phone]  Additional appointment(s) needed: n/a  Additonal Notes: per checkout note from appt with Naya Watson on 3/25    Left CC#

## 2024-03-25 NOTE — LETTER
"3/25/2024       RE: Christina Guzman  [id number] [facility] [id number]     Dear Colleague,    Thank you for referring your patient, Christina Guzman, to the [facility] at [facility]. Please see a copy of my visit note below.    Video-Visit Details    Type of service:  Video Visit    Video Start Time: 1:00 PM   Video End Time: 1:32 PM    Originating Location (pt. Location): Home    Distant Location (provider location):  Offsite (providers home) [facility]     Platform used for Video Visit: [employer]      New Weight Management Nutrition Consultation    Christina Guzman is a 58 year old female presents today for new weight management nutrition consultation.  Patient referred by Dr. Cruz on March 25, 2024.    Patient with Co-morbidities of obesity including:      3/24/2024    10:06 AM   --   I have the following health issues associated with obesity Pre-Diabetes    High Cholesterol    GERD (Reflux)   I have the following symptoms associated with obesity Knee Pain    Back Pain    Fatigue    Hip Pain         Anthropometrics:  Estimated body mass index is 37.31 kg/m  as calculated from the following:    Height as of an earlier encounter on 3/25/24: 1.613 m (5' 3.5\").    Weight as of an earlier encounter on 3/25/24: 97.1 kg (214 lb).    Medications for Weight Loss:  Zepbound prescribed by MD today    NUTRITION HISTORY  Food allergies: None  Food intolerances: pork, sugary-processed foods, flavored latte - GI pain, having to rush to the bathroom. Has experienced for past 15 years, has a variety of tests, no significant diagnosis. Will go from being fine to severe gas pain and 1-2 hours later diarrhea. Seems to do better with full-fat dairy in terms of tolerance.   Vitamin/Mineral Supplements: vitamin D, fiber supplement    Previous methods of diet modification for weight loss: " Feels like nothing she has tried has made a difference and after 3-4 weeks gives it up. Has tried IF, which she found helpful but not with weight loss. Jan-Feb - Once per week she would go 36 hours fasting but would not lose.  Typically does not eat 3 meals daily. Does not eat during the day if busy, doesn't want to get sick.  Works a full time job, often 40+ hr/wk, little energy after work.   Preferences: loves veggvj     Recent Diet Recall:  Breakfast: skip  Lunch: skip or plain whole milk Greek yogurt with honey granola; strawberries/blueberries; nuts    Dinner: 5 pm - corn dogs; frozen enchiladas; spaghetti; tacos   Snacks: after dinner sweet - cake, little mychal   Beverages: Whole Milk (5 glasses per week), water (32 oz) or tea (occ with cream and stevia/honey). Occ coffee.    Dining Out/take-out: 3 nights per week - [employer], [place]           3/24/2024    10:06 AM   Diet Recall Review with Patient   If you do eat supper, what types of food do you typically eat? Something very quick or already prepared   If you do snack, what types of food do you typically eat? Something sweet, candy   How many glasses of juice do you drink in a typical day? 0   How many of glasses of milk do you drink in a typical day? 1   If you do drink milk, what type? Whole   How many 8oz glasses of sugar containing drinks such as Jero-Aid/sweet tea do you drink in a day? 0   How many cans/bottles of sugar pop/soda/tea/sports drinks do you drink in a day? 0   How many cans/bottles of diet pop/soda/tea or sports drink do you drink in a day? 0   How often do you have a drink of alcohol? Never           3/24/2024    10:06 AM   Eating Habits   Generally, my meals include foods like these bread, pasta, rice, potatoes, corn, crackers, sweet dessert, pop, or juice Everyday   Generally, my meals include foods like these fried meats, brats, burgers, french fries, pizza, cheese, chips, or ice cream A Few Times a Week   Eat fast food  (like [employer], Benign[employer], Harjeet [employer]) Less Than Weekly   Eat at a buffet or sit-down restaurant Once a Week   Eat most of my meals in front of the TV or computer Almost Everyday   Often skip meals, eat at random times, have no regular eating times Almost Everyday   Rarely sit down for a meal but snack or graze throughout A Few Times a Week   Eat extra snacks between meals A Few Times a Week   Eat most of my food at the end of the day Almost Everyday   Eat in the middle of the night or wake up at night to eat Never   Eat extra snacks to prevent or correct low blood sugar Never   Eat to prevent acid reflux or stomach pain Never   Worry about not having enough food to eat Never   I eat when I am depressed Less Than Weekly   I eat when I am stressed Less Than Weekly   I eat when I am bored Almost Everyday   I eat when I am anxious Less Than Weekly   I eat when I am happy or as a reward A Few Times a Week   I feel hungry all the time even if I just have eaten Never   Feeling full is important to me Never   I finish all the food on my plate even if I am already full Never   I can't resist eating delicious food or walk past the good food/smell Less Than Weekly   I eat/snack without noticing that I am eating Less Than Weekly   I eat when I am preparing the meal A Few Times a Week   I eat more than usual when I see others eating Never   I have trouble not eating sweets, ice cream, cookies, or chips if they are around the house Everyday   I think about food all day Almost Everyday   What foods, if any, do you crave? Sweets/Candy/Chocolate           3/24/2024    10:06 AM   Amount of Food   I feel out of control when eating Never   I eat a large amount of food, like a loaf of bread, a box of cookies, a pint/quart of ice cream, all at once Never   I eat a large amount of food even when I am not hungry Never   I eat rapidly Never   I eat alone because I feel embarrassed and do not want others to see how much I have eaten Never    I eat until I am uncomfortably full Never   I feel bad, disgusted, or guilty after I overeat Monthly       Physical Activity:        3/24/2024    10:06 AM   Activity/Exercise History   How much of a typical 12 hour day do you spend sitting? Most of the Day   How much of a typical 12 hour day do you spend lying down? Less Than Half the Day   How much of a typical day do you spend walking/standing? Less Than Half the Day   How many hours (not including work) do you spend on the TV/Video Games/Computer/Tablet/Phone? 6 Hours or More   How many times a week are you active for the purpose of exercise? Never   What keeps you from being more active? Lack of Time    Too tired   How many total minutes do you spend doing some activity for the purpose of exercising when you exercise? Less Than 15 Minutes       Nutrition Prescription  Recommended energy/nutrient modification.    Nutrition Diagnosis  Obesity r/t long history of positive energy balance aeb BMI >30.    Nutrition Intervention  Materials/education provided on hypocaloric diet for weight loss. Discussed Volumetric eating to help satiety level on fewer calories; portion control and healthy food choices (Plate Method and Volumetrics handouts), meal and snack planning tips and resources. Provided education on nutrition considerations when starting GLP1 medication including, changes in meal/snack volumes; meeting protein, hydration and micronutrient needs; limiting high-fat foods; and diet/lifestyle strategies for preventing constipation.  Patient demonstrates understanding.  Co-developed goals to work towards.   Provided pt with list of goals and resources below via [employer].     Expected Engagement: good  Follow-Up Plans: meal/snack planning     Nutrition Goals  1) Ensure your consuming 60+ gm protein daily (30 gm protein, twice daily)        The Plate Method:  Plate method can be used for general guidance on balanced meals/portion sizes (see link below for  picture/more information)                 Make 1/2 your plate non starchy vegetables (cauliflower, broccoli, asparagus, Sumner sprouts, lettuce, carrots, for example).                3+ oz lean protein sources (salmon/skinless chicken/turkey breast/pork loin/lean cuts of beef/ or non-animal protein such as black, kidney or welch beans, tofu/edamame/tempeh)    (Note: 3 oz = deck of cards size)                1/2 to 1 cup carbohydrate choices such as whole grain starches or starchy vegetables or fruit. For example: quinoa, brown rice, barley, potatoes, sweet potatoes, winter squash, peas, corn, or fruit.                Choose ~0-2 added fat servings at a meal (avocados, nut butters, nuts or seeds, olive oil, vegetable oils).    [email or web address]    https://www.cdc.gov/diabetes/images/managing/Diabetes-Manage-Eat-Well-Plate-Graphic_600px.jpg    Protein Sources   [email or web address]     Carbohydrates  [email or web address]     Mindful Eating  [email or web address]     Summary of Volumetrics Eating Plan  [email or web address]     Meal Replacement Shake Options:   *Protein Shake Criteria: no more than 210 Calories, at least 20 grams of protein, and less than 10 grams of sugar   Premier Protein (160 Calories, 30 g protein)  Slim Fast Advanced Nutrition (180 Calories, 20 g protein)  Muscle Milk, lactose-free, 17 oz bottle (210 Calories, 30 g protein)  Integrated Supplements, no artificial sugars (110 Calories, 20 g protein)  Boost/Ensure Max (160 calories, 30 gm protein)   Fairlife Protein Shakes (160-230 calories, 26-42 gm protein)  Aldi's Elevation Protein Powder (180 calories, 30 gm protein)   Orgain Protein Shakes (130-160 calories, 20-26 gm protein)       Follow-Up:  1 month, PRN    Time spent with patient: 32 minutes.  Naya Watson, RD, LD

## 2024-05-05 DIAGNOSIS — E78.5 HYPERLIPIDEMIA LDL GOAL <130: ICD-10-CM

## 2024-05-06 DIAGNOSIS — K21.9 GASTROESOPHAGEAL REFLUX DISEASE WITHOUT ESOPHAGITIS: ICD-10-CM

## 2024-05-06 RX ORDER — ATORVASTATIN CALCIUM 40 MG/1
TABLET, FILM COATED ORAL
Qty: 90 TABLET | Refills: 1 | Status: SHIPPED | OUTPATIENT
Start: 2024-05-06

## 2024-05-06 RX ORDER — OMEPRAZOLE 40 MG/1
40 CAPSULE, DELAYED RELEASE ORAL DAILY
Qty: 30 CAPSULE | Refills: 1 | Status: SHIPPED | OUTPATIENT
Start: 2024-05-06 | End: 2024-07-05

## 2024-05-08 ASSESSMENT — SLEEP AND FATIGUE QUESTIONNAIRES
HOW LIKELY ARE YOU TO NOD OFF OR FALL ASLEEP WHILE SITTING AND READING: SLIGHT CHANCE OF DOZING
HOW LIKELY ARE YOU TO NOD OFF OR FALL ASLEEP WHILE SITTING AND TALKING TO SOMEONE: WOULD NEVER DOZE
HOW LIKELY ARE YOU TO NOD OFF OR FALL ASLEEP WHILE SITTING INACTIVE IN A PUBLIC PLACE: SLIGHT CHANCE OF DOZING
HOW LIKELY ARE YOU TO NOD OFF OR FALL ASLEEP WHILE WATCHING TV: HIGH CHANCE OF DOZING
HOW LIKELY ARE YOU TO NOD OFF OR FALL ASLEEP WHILE LYING DOWN TO REST IN THE AFTERNOON WHEN CIRCUMSTANCES PERMIT: MODERATE CHANCE OF DOZING
HOW LIKELY ARE YOU TO NOD OFF OR FALL ASLEEP IN A CAR, WHILE STOPPED FOR A FEW MINUTES IN TRAFFIC: WOULD NEVER DOZE
HOW LIKELY ARE YOU TO NOD OFF OR FALL ASLEEP WHEN YOU ARE A PASSENGER IN A CAR FOR AN HOUR WITHOUT A BREAK: MODERATE CHANCE OF DOZING
HOW LIKELY ARE YOU TO NOD OFF OR FALL ASLEEP WHILE SITTING QUIETLY AFTER LUNCH WITHOUT ALCOHOL: HIGH CHANCE OF DOZING

## 2024-05-12 PROBLEM — Z86.69 HISTORY OF MIGRAINE: Status: ACTIVE | Noted: 2018-09-26

## 2024-05-12 PROBLEM — E66.812 CLASS 2 SEVERE OBESITY DUE TO EXCESS CALORIES WITH SERIOUS COMORBIDITY IN ADULT (H): Chronic | Status: ACTIVE | Noted: 2023-11-08

## 2024-05-12 PROBLEM — I65.29 CAROTID STENOSIS: Status: ACTIVE | Noted: 2018-09-25

## 2024-05-12 PROBLEM — K57.30 DIVERTICULOSIS OF LARGE INTESTINE WITHOUT HEMORRHAGE: Status: ACTIVE | Noted: 2022-09-22

## 2024-05-12 PROBLEM — Z98.890 H/O CAROTID ENDARTERECTOMY: Chronic | Status: ACTIVE | Noted: 2018-10-05

## 2024-05-12 PROBLEM — K57.30 DIVERTICULOSIS OF LARGE INTESTINE WITHOUT HEMORRHAGE: Chronic | Status: ACTIVE | Noted: 2022-09-22

## 2024-05-12 PROBLEM — K58.0 IRRITABLE BOWEL SYNDROME WITH DIARRHEA: Status: ACTIVE | Noted: 2024-05-12

## 2024-05-12 PROBLEM — K44.9 DIAPHRAGMATIC HERNIA: Status: ACTIVE | Noted: 2018-07-06

## 2024-05-12 PROBLEM — E66.01 CLASS 2 SEVERE OBESITY DUE TO EXCESS CALORIES WITH SERIOUS COMORBIDITY IN ADULT (H): Chronic | Status: ACTIVE | Noted: 2023-11-08

## 2024-05-14 ENCOUNTER — VIRTUAL VISIT (OUTPATIENT)
Dept: SLEEP MEDICINE | Facility: CLINIC | Age: 59
End: 2024-05-14
Attending: OBSTETRICS & GYNECOLOGY
Payer: COMMERCIAL

## 2024-05-14 VITALS — WEIGHT: 205 LBS | BODY MASS INDEX: 35 KG/M2 | HEIGHT: 64 IN

## 2024-05-14 DIAGNOSIS — G47.09 OTHER INSOMNIA: Primary | ICD-10-CM

## 2024-05-14 DIAGNOSIS — R06.89 DYSPNEA AND RESPIRATORY ABNORMALITY: ICD-10-CM

## 2024-05-14 DIAGNOSIS — G25.81 RESTLESS LEGS SYNDROME (RLS): ICD-10-CM

## 2024-05-14 DIAGNOSIS — G47.00 INSOMNIA, UNSPECIFIED TYPE: ICD-10-CM

## 2024-05-14 DIAGNOSIS — R53.81 MALAISE AND FATIGUE: ICD-10-CM

## 2024-05-14 DIAGNOSIS — R53.83 MALAISE AND FATIGUE: ICD-10-CM

## 2024-05-14 DIAGNOSIS — E66.812 CLASS 2 SEVERE OBESITY DUE TO EXCESS CALORIES WITH SERIOUS COMORBIDITY AND BODY MASS INDEX (BMI) OF 35.0 TO 35.9 IN ADULT (H): ICD-10-CM

## 2024-05-14 DIAGNOSIS — R06.00 DYSPNEA AND RESPIRATORY ABNORMALITY: ICD-10-CM

## 2024-05-14 DIAGNOSIS — E66.01 CLASS 2 SEVERE OBESITY DUE TO EXCESS CALORIES WITH SERIOUS COMORBIDITY AND BODY MASS INDEX (BMI) OF 35.0 TO 35.9 IN ADULT (H): ICD-10-CM

## 2024-05-14 DIAGNOSIS — Z72.820 LACK OF ADEQUATE SLEEP: ICD-10-CM

## 2024-05-14 PROBLEM — G45.9 TIA (TRANSIENT ISCHEMIC ATTACK): Chronic | Status: ACTIVE | Noted: 2018-10-05

## 2024-05-14 PROCEDURE — 99204 OFFICE O/P NEW MOD 45 MIN: CPT | Mod: 95 | Performed by: PHYSICIAN ASSISTANT

## 2024-05-14 RX ORDER — ZOLPIDEM TARTRATE 5 MG/1
TABLET ORAL
Qty: 1 TABLET | Refills: 0 | Status: SHIPPED | OUTPATIENT
Start: 2024-05-14 | End: 2024-09-17

## 2024-05-14 ASSESSMENT — PAIN SCALES - GENERAL: PAINLEVEL: NO PAIN (0)

## 2024-05-14 NOTE — NURSING NOTE
Is the patient currently in the state of [place]? YES    Visit mode:VIDEO    If the visit is dropped, the patient can be reconnected by: VIDEO VISIT: Text to cell phone:   Telephone Information:   Mobile [phone]       Will anyone else be joining the visit? NO  (If patient encounters technical issues they should call [phone] :[phone])    How would you like to obtain your AVS? MyChart    Are changes needed to the allergy or medication list? Pt stated no changes to allergies and Pt stated no med changes    Are refills needed on medications prescribed by this physician? NO    Reason for visit: Consult    Taryn RANDOLPH    Has patient had flu shot for current/most recent flu season? If so, when? Yes: 11/2023

## 2024-05-14 NOTE — PATIENT INSTRUCTIONS
"          MY TREATMENT INFORMATION FOR SLEEP APNEA-  Christina Guzman    DOCTOR : Terry Mayfield PA    Am I having a sleep study at a sleep center?  --->Due to normal delays, you will be contacted within 2-4 weeks to schedule    Am I having a home sleep study?  --->Watch the video for the device you are using:    -/drop off device-   https://www.Metal Resources[employer].com/watch?v=yGGFBdELGhk    -Disposable device sent out require phone/computer application-   https://www.E-Generator.com/watch?v=BCce_vbiwxE      Frequently asked questions:  1. What is Obstructive Sleep Apnea (FABBY)? FABBY is the most common type of sleep apnea. Apnea means, \"without breath.\"  Apnea is most often caused by narrowing or collapse of the upper airway as muscles relax during sleep.   Almost everyone has occasional apneas. Most people with sleep apnea have had brief interruptions at night frequently for many years.  The severity of sleep apnea is related to how frequent and severe the events are.   2. What are the consequences of FABBY? Symptoms include: feeling sleepy during the day, snoring loudly, gasping or stopping of breathing, trouble sleeping, and occasionally morning headaches or heartburn at night.  Sleepiness can be serious and even increase the risk of falling asleep while driving. Other health consequences may include development of high blood pressure and other cardiovascular disease in persons who are susceptible. Untreated FABBY  can contribute to heart disease, stroke and diabetes.   3. What are the treatment options? In most situations, sleep apnea is a lifelong disease that must be managed with daily therapy. Medications are not effective for sleep apnea and surgery is generally not considered until other therapies have been tried. Your treatment is your choice . Continuous Positive Airway (CPAP) works right away and is the therapy that is effective in nearly everyone. An oral device to hold your jaw forward is usually the next most " reliable option. Other options include postioning devices (to keep you off your back), weight loss, and surgery including a tongue pacing device. There is more detail about some of these options below.  4. Are my sleep studies covered by insurance? Although we will request verification of coverage, we advise you also check in advance of the study to ensure there is coverage.    Important tips for those choosing CPAP and similar devices  REMEMBER-IF YOU RECEIVE A CALL FROM  [phone]-->IT IS TO SETUP A DEVICE  For new devices, sign up for device CARA to monitor your device for your followup visits  We encourage you to utilize the [employer] cara or website ( https://[place].[employer]/ ) to monitor your therapy progress and share the data with your healthcare team when you discuss your sleep apnea.                                                    Know your equipment:  CPAP is continuous positive airway pressure that prevents obstructive sleep apnea by keeping the throat from collapsing while you are sleeping. In most cases, the device is  smart  and can slowly self-adjusts if your throat collapses and keeps a record every day of how well you are treated-this information is available to you and your care team.  BPAP is bilevel positive airway pressure that keeps your throat open and also assists each breath with a pressure boost to maintain adequate breathing.  Special kinds of BPAP are used in patients who have inadequate breathing from lung or heart disease. In most cases, the device is  smart  and can slowly self-adjusts to assist breathing. Like CPAP, the device keeps a record of how well you are treated.  Your mask is your connection to the device. You get to choose what feels most comfortable and the staff will help to make sure if fits. Here: are some examples of the different masks that are available: Magnetic mask aids may assist with use but there are safety issues that should be addressed when  considering with magnets* ( see end of discussion).       Key points to remember on your journey with sleep apnea:  Sleep study.  PAP devices often need to be adjusted during a sleep study to show that they are effective and adjusted right.  Good tips to remember: Try wearing just the mask during a quiet time during the day so your body adapts to wearing it. A humidifier is recommended for comfort in most cases to prevent drying of your nose and throat. Allergy medication from your provider may help you if you are having nasal congestion.  Getting settled-in. It takes more than one night for most of us to get used to wearing a mask. Try wearing just the mask during a quiet time during the day so your body adapts to wearing it. A humidifier is recommended for comfort in most cases. Our team will work with you carefully on the first day and will be in contact within 4 days and again at 2 and 4 weeks for advice and remote device adjustments. Your therapy is evaluated by the device each day.   Use it every night. The more you are able to sleep naturally for 7-8 hours, the more likely you will have good sleep and to prevent health risks or symptoms from sleep apnea. Even if you use it 4 hours it helps. Occasionally all of us are unable to use a medical therapy, in sleep apnea, it is not dangerous to miss one night.   Communicate. Call our skilled team on the number provided on the first day if your visit for problems that make it difficult to wear the device. Over 2 out of 3 patients can learn to wear the device long-term with help from our team. Remember to call our team or your sleep providers if you are unable to wear the device as we may have other solutions for those who cannot adapt to mask CPAP therapy. It is recommended that you sleep your sleep provider within the first 3 months and yearly after that if you are not having problems.   Use it for your health. We encourage use of CPAP masks during daytime quiet  periods to allow your face and brain to adapt to the sensation of CPAP so that it will be a more natural sensation to awaken to at night or during naps. This can be very useful during the first few weeks or months of adapting to CPAP though it does not help medically to wear CPAP during wakefulness and  should not be used as a strategy just to meet guidelines.  Take care of your equipment. Make sure you clean your mask and tubing using directions every day and that your filter and mask are replaced as recommended or if they are not working.     *Masks with magnets:  Updated Contraindications  Masks with magnetic components are contraindicated for use by patients where they, or anyone in close physical contact while using the mask, have the following:   Active medical implants that interact with magnets (i.e., pacemakers, implantable cardioverter defibrillators (ICD), neurostimulators, cerebrospinal fluid (CSF) shunts, insulin/infusion pumps)   Metallic implants/objects containing ferromagnetic material (i.e., aneurysm clips/flow disruption devices, embolic coils, stents, valves, electrodes, implants to restore hearing or balance with implanted magnets, ocular implants, metallic splinters in the eye)  Updated Warning  Keep the mask magnets at a safe distance of at least 6 inches (150 mm) away from implants or medical devices that may be adversely affected by magnetic interference. This warning applies to you or anyone in close physical contact with your mask. The magnets are in the frame and lower headgear clips, with a magnetic field strength of up to 400mT. When worn, they connect to secure the mask but may inadvertently detach while asleep.  Implants/medical devices, including those listed within contraindications, may be adversely affected if they change function under external magnetic fields or contain ferromagnetic materials that attract/repel to magnetic fields (some metallic implants, e.g., contact lenses  with metal, dental implants, metallic cranial plates, screws, fabi hole covers, and bone substitute devices). Consult your physician and  of your implant / other medical device for information on the potential adverse effects of magnetic fields.    BESIDES CPAP, WHAT OTHER THERAPIES ARE THERE?    Positioning Device  Positioning devices are generally used when sleep apnea is mild and only occurs on your back.This example shows a pillow that straps around the waist. It may be appropriate for those whose sleep study shows milder sleep apnea that occurs primarily when lying flat on one's back. Preliminary studies have shown benefit but effectiveness at home may need to be verified by a home sleep test. These devices are generally not covered by medical insurance.  Examples of devices that maintain sleeping on the back to prevent snoring and mild sleep apnea.    Belt type body positioner  http://[employer]/    Electronic reminder  http://nightshifttherapy.[employer]/            Oral Appliance  What is oral appliance therapy?  An oral appliance device fits on your teeth at night like a retainer used after having braces. The device is made by a specialized dentist and requires several visits over 1-2 months before a manufactured device is made to fit your teeth and is adjusted to prevent your sleep apnea. Once an oral device is working properly, snoring should be improved. A home sleep test may be recommended at that time if to determine whether the sleep apnea is adequately treated.       Some things to remember:  -Oral devices are often, but not always, covered by your medical insurance. Be sure to check with your insurance provider.   -If you are referred for oral therapy, you will be given a list of specialized dentists to consider or you may choose to visit the Web site of the American Academy of Dental Sleep Medicine  -Oral devices are less likely to work if you have severe sleep apnea or are extremely  overweight.     More detailed information  An oral appliance is a small acrylic device that fits over the upper and lower teeth  (similar to a retainer or a mouth guard). This device slightly moves jaw forward, which moves the base of the tongue forward, opens the airway, improves breathing for effective treat snoring and obstructive sleep apnea in perhaps 7 out of 10 people .  The best working devices are custom-made by a dental device  after a mold is made of the teeth 1, 2, 3.  When is an oral appliance indicated?  Oral appliance therapy is recommended as a first-line treatment for patients with primary snoring, mild sleep apnea, and for patients with moderate sleep apnea who prefer appliance therapy to use of CPAP4, 5. Severity of sleep apnea is determined by sleep testing and is based on the number of respiratory events per hour of sleep.   How successful is oral appliance therapy?  The success rate of oral appliance therapy in patients with mild sleep apnea is 75-80% while in patients with moderate sleep apnea it is 50-70%. The chance of success in patients with severe sleep apnea is 40-50%. The research also shows that oral appliances have a beneficial effect on the cardiovascular health of FABBY patients at the same magnitude as CPAP therapy7.  Oral appliances should be a second-line treatment in cases of severe sleep apnea, but if not completely successful then a combination therapy utilizing CPAP plus oral appliance therapy may be effective. Oral appliances tend to be effective in a broad range of patients although studies show that the patients who have the highest success are females, younger patients, those with milder disease, and less severe obesity. 3, 6.   Finding a dentist that practices dental sleep medicine  Specific training is available through the American Academy of Dental Sleep Medicine for dentists interested in working in the field of sleep. To find a dentist who is educated in  the field of sleep and the use of oral appliances, near you, visit the Web site of the American Academy of Dental Sleep Medicine.    References  1. Gil, et al. Objectively measured vs self-reported compliance during oral appliance therapy for sleep-disordered breathing. Chest 2013; 144(5): 7509-1101.  2. Krishna et al. Objective measurement of compliance during oral appliance therapy for sleep-disordered breathing. Thorax 2013; 68(1): 91-96.  3. Bobby et al. Mandibular advancement devices in 620 men and women with FABBY and snoring: tolerability and predictors of treatment success. Chest 2004; 125: 3211-3011.  4. Angelique, et al. Oral appliances for snoring and FABBY: a review. Sleep 2006; 29: 244-262.  5. Elan et al. Oral appliance treatment for FABBY: an update. J Clin Sleep Med 2014; 10(2): 215-227.  6. Milad et al. Predictors of OSAH treatment outcome. J Dent Res 2007; 86: 5793-5020.      Weight Loss:   Your Body mass index is 35.19 kg/m .    Being overweight does not necessarily mean you will have health consequences.  Those who have BMI over 35 or over 27 with existing medical conditions carries greater risk.   Weight loss decreases severity of sleep apnea in most people with obesity. For those with mild obesity who have developed snoring with weight gain, even 15-30 pound weight loss can improve and occasionally milder eliminate sleep apnea.  Structured and life-long dietary and health habits are necessary to lose weight and keep healthier weight levels.     The Comprehensive Weight loss program offers all aspects of weight loss strategies including two Non-Surgical Weight Loss Programs: Medical Weight Management and our 24 Week Healthy Lifestyle Program:    Medical Weight Management: You will meet with a Medical Weight Management Provider, as well as a Registered Dietician. The program may include medication therapy, dietary education, recommended exercise and physical therapy programs,  monthly support group meetings, and possible psychological counseling. Follow up visits with the provider or dietician are scheduled based on your progress and needs.    24 Week Healthy Lifestyle Program: This unique program is designed to give you the support of weekly appointments and activities thru a 24-week period. It may include all of the components of the basic program (above), with the addition of 11 individual Health  Visits, 24-week access to the [employer] website for over 700 online classes, and monthly support group meetings. This program has an out-of-pocket expense of $499 to cover the items that can not be billed to insurance (health coaches and [employer] access), and is non-refundable/non-transferable (you may be able to use a Health Savings Account; ask your HSA provider). There may be an optional meal replacement plan prescribed as well.   Surgical management achieves meaningful long-term weight loss and improvement in health risks in most patients with more severe obesity.      Sleep Apnea Surgery:    Surgery for obstructive sleep apnea is considered generally only when other therapies fail to work. Surgery may be discussed with you if you are having a difficult time tolerating CPAP and or when there is an abnormal structure that requires surgical correction.  Nose and throat surgeries often enlarge the airway to prevent collapse.  Most of these surgeries create pain for 1-2 weeks and up to half of the most common surgeries are not effective throughout life.  You should carefully discuss the benefits and drawbacks to surgery with your sleep provider and surgeon to determine if it is the best solution for you.   More information  Surgery for FABBY is directed at areas that are responsible for narrowing or complete obstruction of the airway during sleep.  There are a wide range of procedures available to enlarge and/or stabilize the airway to prevent blockage of breathing in the three major  areas where it can occur: the palate, tongue, and nasal regions.  Successful surgical treatment depends on the accurate identification of the factors responsible for obstructive sleep apnea in each person.  A personalized approach is required because there is no single treatment that works well for everyone.  Because of anatomic variation, consultation with an examination by a sleep surgeon is a critical first step in determining what surgical options are best for each patient.  In some cases, examination during sedation may be recommended in order to guide the selection of procedures.  Patients will be counseled about risks and benefits as well as the typical recovery course after surgery. Surgery is typically not a cure for a person s FABBY.  However, surgery will often significantly improve one s FABBY severity (termed  success rate ).  Even in the absence of a cure, surgery will decrease the cardiovascular risk associated with OSA7; improve overall quality of life8 (sleepiness, functionality, sleep quality, etc).      Palate Procedures:  Patients with FABBY often have narrowing of their airway in the region of their tonsils and uvula.  The goals of palate procedures are to widen the airway in this region as well as to help the tissues resist collapse.  Modern palate procedure techniques focus on tissue conservation and soft tissue rearrangement, rather than tissue removal.  Often the uvula is preserved in this procedure. Residual sleep apnea is common in patient after pharyngoplasty with an average reduction in sleep apnea events of 33%2.      Tongue Procedures:  ExamWhile patients are awake, the muscles that surround the throat are active and keep this region open for breathing. These muscles relax during sleep, allowing the tongue and other structures to collapse and block breathing.  There are several different tongue procedures available.  Selection of a tongue base procedure depends on characteristics seen on  physical exam.  Generally, procedures are aimed at removing bulky tissues in this area or preventing the back of the tongue from falling back during sleep.  Success rates for tongue surgery range from 50-62%3.    Hypoglossal Nerve Stimulation:  Hypoglossal nerve stimulation has recently received approval from the [place] Food and Drug Administration for the treatment of obstructive sleep apnea.  This is based on research showing that the system was safe and effective in treating sleep apnea6.  Results showed that the median AHI score decreased 68%, from 29.3 to 9.0. This therapy uses an implant system that senses breathing patterns and delivers mild stimulation to airway muscles, which keeps the airway open during sleep.  The system consists of three fully implanted components: a small generator (similar in size to a pacemaker), a breathing sensor, and a stimulation lead.  Using a small handheld remote, a patient turns the therapy on before bed and off upon awakening.    Candidates for this device must be greater than 18 years of age, have moderate to severe obstructive sleep apnea with less than 25% central events  (AHI between 15-65), BMI less than 35, have tried CPAP/oral appliance for at least 8 weeks without success, and have appropriate upper airway anatomy (determined by a sleep endoscopy performed by Dr. Maxi Matute or Dr. Jagjit Harding).    Nasal Procedures:  Nasal obstruction can interfere with nasal breathing during the day and night.  Studies have shown that relief of nasal obstruction can improve the ability of some patients to tolerate positive airway pressure therapy for obstructive sleep apnea1.  Treatment options include medications such as nasal saline, topical corticosteroid and antihistamine sprays, and oral medications such as antihistamines or decongestants. Non-surgical treatments can include external nasal dilators for selected patients. If these are not successful by themselves,  surgery can improve the nasal airway either alone or in combination with these other options.        Combination Procedures:  Combination of surgical procedures and other treatments may be recommended, particularly if patients have more than one area of narrowing or persistent positional disease.  The success rate of combination surgery ranges from 66-80%2,3.    References  Dayanara FU. The Role of the Nose in Snoring and Obstructive Sleep Apnoea: An Update.  Eur Arch Otorhinolaryngol. 2011; 268: 1365-73.   Talha SM; Ryan JA; Miriam JR; Pallanch JF; Jt MB; Forrset SG; Maryse KENNEY. Surgical modifications of the upper airway for obstructive sleep apnea in adults: a systematic review and meta-analysis. SLEEP 2010;33(10):8696-1209. Mina MORLEY. Hypopharyngeal surgery in obstructive sleep apnea: an evidence-based medicine review.  Arch Otolaryngol Head Neck Surg. 2006 Feb;132(2):206-13.  Brent YH1, Radha Y, Raul MICHAEL. The efficacy of anatomically based multilevel surgery for obstructive sleep apnea. Otolaryngol Head Neck Surg. 2003 Oct;129(4):327-35.  Mina MORLEY, Goldberg A. Hypopharyngeal Surgery in Obstructive Sleep Apnea: An Evidence-Based Medicine Review. Arch Otolaryngol Head Neck Surg. 2006 Feb;132(2):206-13.  Gigi WILLIAM et al. Upper-Airway Stimulation for Obstructive Sleep Apnea.  N Engl J Med. 2014 Jan 9;370(2):139-49.  Maxine Y et al. Increased Incidence of Cardiovascular Disease in Middle-aged Men with Obstructive Sleep Apnea. Am J Respir Crit Care Med; 2002 166: 159-165  Stallings EM et al. Studying Life Effects and Effectiveness of Palatopharyngoplasty (SLEEP) study: Subjective Outcomes of Isolated Uvulopalatopharyngoplasty. Otolaryngol Head Neck Surg. 2011; 144: 623-631.        WHAT IF I ONLY HAVE SNORING?    Mandibular advancement devices, lateral sleep positioning, long-term weight loss and treatment of nasal allergies have been shown to improve snoring.  Exercising tongue muscles with a game  (https://[employer]Michael Bieker.Melon #usemelon/us/cara/soundly-reduce-snoring/ov6668117993) or stimulating the tongue during the day with a device (https://doi.org/10.3390/icq22774866) have improved snoring in some individuals.  https://www.[employer].Melon #usemelon/  https://www.sleepfoundation.org/best-anti-snoring-mouthpieces-and-mouthguards    Remember to Drive Safe... Drive Alive     Sleep health profoundly affects your health, mood, and your safety.  Thirty three percent of the population (one in three of us) is not getting enough sleep and many have a sleep disorder. Not getting enough sleep or having an untreated / undertreated sleep condition may make us sleepy without even knowing it. In fact, our driving could be dramatically impaired due to our sleep health. As your provider, here are some things I would like you to know about driving:     Here are some warning signs for impairment and dangerous drowsy driving:              -Having been awake more than 16 hours               -Looking tired               -Eyelid drooping              -Head nodding (it could be too late at this point)              -Driving for more than 30 minutes     Some things you could do to make the driving safer if you are experiencing some drowsiness:              -Stop driving and rest              -Call for transportation              -Make sure your sleep disorder is adequately treated     Some things that have been shown NOT to work when experiencing drowsiness while driving:              -Turning on the radio              -Opening windows              -Eating any  distracting  /  entertaining  foods (e.g., sunflower seeds, candy, or any other)              -Talking on the phone      Your decision may not only impact your life,  [facility]  Cognitive Behavioral Therapy for Insomnia       Core Sleep Training Module    Now that you understand a bit more about how sleep works and what causes insomnia, you are ready to begin CBT-I Core Sleep Training.  This  process involves five key strategies that will:    Strengthen your sleep drive and circadian sleep rhythm  Strengthen the link between your bed and sleep    It will be important that you continue to keep track of your sleep using your Insomnia  Miguel or your paper sleep diary.      Core Sleep Strategies    Much like physical activity and healthy eating strengthens our body, studies show that the following Core strategies are key to achieving stronger, healthier sleep. The focus is on quality of sleep (not quantity) and improving how you feel during the day.     Reduce Your Time in Bed    Spending extra time in bed trying to get more sleep can cause more sleep disruption and weaken sleep efficiency. Sleep efficiency is simply the percentage of time a person spends asleep while in bed. Normal sleep efficiency is thought to be 85% or greater.  By reducing your time in bed, you will be awake longer during the day leading to quicker and deeper sleep at night. This strategy does not reduce the amount of sleep you get, just the time you are awake in bed.                                             Your provider has recommended the following initial sleep schedule determined by your  estimate of average sleep time over the past two weeks plus 30 minutes.  It also consider the best time for you to sleep.     Your Sleep Schedule Prescription                       Total Time in Bed:  6 hours                     Bedtime:  No earlier than 11 pm                      Wake-up Time:  Out of bed every day by 5 am      Don't go to bed until you feel sleepy (not tired or fatigued)     This helps increase your sleep drive by keeping you awake longer.  If you go to bed when you're not sleepy, it gives your brain the wrong message and can lead to frustration.     If you feel sleepy before your prescribed bedtime, find activities that can help you stay awake until it is time for you to go to bed. Even brief naps in the evening can be  very disruptive of sleep at night.      Don't stay in bed unless you are sleeping      If you are unable to fall asleep or return to sleep after about 30 minutes, get out of bed. This helps train your brain that the bed is for sleep. It is harmful to your sleep when you are worried or frustrated in bed. Do not read, eat, worry, think about sleep, use mobile phones or tablets, or watch TV in bed. Do not watch the clock during the night.     Go to another room and do something relaxing. Plan things you can do when you get out of bed. Avoid use of mobile devices or computers when out of bed.    Return to bed only when you feel sleepy again.  Repeat as often as needed throughout the night.      Get out of bed at the same time every day    Getting up at the same time helps set your biological clock. It is important to stick to your wake time no matter how much sleep you got the night before or how you feel in the morning.    Varying your wake can have the same effect as jet lag.  It disrupts your biological clock and makes you feel more tired and exhausted.  Trying to  catch up  on sleep on the weekends only makes things worse and sets you up for a cycle of poor sleep the following weekdays.      Make sure you set an alarm and keep it away from the bed to prevent looking at the clock during the night.       Avoid daytime napping    Avoid daytime napping if possible. Napping partially fulfills your need for sleep and weakens your sleep drive at night.    However, if you find yourself sleepy (not just tired) you can take a brief 15-30-minute nap during the day if needed.  Naps within 7-8 hours of your prescribed wake time are less likely to affect your sleep the coming night.  Sleepy people make more mistakes and may hurt themselves or others. Therefore, safety trumps all other sleep prescriptions.  Never drive or operate equipment if drowsy or sleepy.     Changing Your Sleep Schedule Prescription    After a week, you can  adjust your sleep schedule prescription based on how well you are sleeping. Use the following guidelines to change your prescribed time in bed based on information from your [facility]  cara or paper sleep diary.          Increase Time in Bed by 15 Minutes IF:    Your [facility]omnia  cara progress tracker estimates that your sleep efficiency has been greater than 90% over the past week (press Progress icon on the home screen and swipe left for this value).    OR you are falling asleep in less than 30 minutes AND awake less than 30 minutes during the night.              Decrease Time in Bed by 15 Minutes IF:    Your [employer]  cara sleep diary progress tracker estimates that your sleep efficiency has been less than 80% over the past week (press Progress icon on the home screen and swipe left for this value).    OR it is taking longer than 30 minutes to fall asleep OR you are awake more than 30 minutes during the night.      DO NOT decrease your sleep schedule below 5.5  hours     Change is Challenging    Research shows that making significant changes in sleep habits improves sleep quality and how you feel. Improvement takes time and is not always steady. Change is challenging and can be stressful.  This is especially true if old habits - like spending more time in bed -- were a way to avoid worry about getting enough sleep.   but also the life of others. Please, remember to drive safe for yourself and all of us.           Your Body mass index is 35.19 kg/m .  Weight management is a personal decision.  If you are interested in exploring weight loss strategies, the following discussion covers the approaches that may be successful. Body mass index (BMI) is one way to tell whether you are at a healthy weight, overweight, or obese. It measures your weight in relation to your height.  A BMI of 18.5 to 24.9 is in the healthy range. A person with a BMI of 25 to 29.9 is considered overweight, and someone with a BMI of 30  or greater is considered obese. More than two-thirds of [place] adults are considered overweight or obese.  Being overweight or obese increases the risk for further weight gain. Excess weight may lead to heart disease and diabetes.  Creating and following plans for healthy eating and physical activity may help you improve your health.  Weight control is part of healthy lifestyle and includes exercise, emotional health, and healthy eating habits. Careful eating habits lifelong are the mainstay of weight control. Though there are significant health benefits from weight loss, long-term weight loss with diet alone may be very difficult to achieve- studies show long-term success with dietary management in less than 10% of people. Attaining a healthy weight may be especially difficult to achieve in those with severe obesity. In some cases, medications, devices and surgical management might be considered.  What can you do?  If you are overweight or obese and are interested in methods for weight loss, you should discuss this with your provider.   Consider reducing daily calorie intake by 500 calories.   Keep a food journal.   Avoiding skipping meals, consider cutting portions instead.    Diet combined with exercise helps maintain muscle while optimizing fat loss. Strength training is particularly important for building and maintaining muscle mass. Exercise helps reduce stress, increase energy, and improves fitness. Increasing exercise without diet control, however, may not burn enough calories to loose weight.     Start walking three days a week 10-20 minutes at a time  Work towards walking thirty minutes five days a week   Eventually, increase the speed of your walking for 1-2 minutes at time    In addition, we recommend that you review healthy lifestyles and methods for weight loss available through the [employer] patient information sites:  http://win.niddk.nih.gov/publications/index.htm    And look  into health and wellness programs that may be available through your health insurance provider, employer, local community center, or [employer] club.

## 2024-05-14 NOTE — PROGRESS NOTES
Virtual Visit Details    Type of service:  Video Visit     Originating Location (pt. Location): Home    Distant Location (provider location):  On-site  Platform used for Video Visit: [facility]    Outpatient Sleep Medicine Consultation:      Name: Christina Guzman MRN# [id number]   Age: 58 year old YOB: 1965     Date of Consultation: May 14, 2024  Consultation is requested by: Christina Barnett DO  [id number] [place] [address] Christina Barnett  Primary care provider: Jessica Recio       Reason for Sleep Consult:     Christina Guzman is sent by Christina Barnett for a sleep consultation regarding insomnia.    Patient s Reason for visit  Christina Guzman main reason for visit: I sleep on average 5-6 hours per night, waking often. This leads me to be very sleepy all day.  Patient states problem(s) started: 3-4 years ago  Christina Guzman's goals for this visit: Develop a plan to obtain better sleep           Assessment and Plan:     Summary Sleep Diagnoses & Recommendations:   Patient has features and risk factors for possible obstructive sleep apnea including: BMI of 35, snoring, non-refreshing sleep, morning headaches, daytime fatigue/sleepiness (ESS 12) and difficulty maintaining sleep. The STOP-BANG score is 4/8. The pathophysiology, diagnosis and treatment of FABBY was discussed. Will proceed with Polysomnogram (using 4% desaturation/Medicare/ AASM 1B scoring rules) to evaluate for obstructive sleep apnea.  Ambien if needed. Patient is a poor candidate for Home Sleep Testing due to not high probability of severe sleep apnea and chronic severe insomnia (DERECK score 22-28).     Insomnia, sleep onset and sleep maintenance, likely due to a variety of factors including inadequate sleep hygiene, conditioned hyperarousal and  restless leg syndrome. Co-occurring depression identified and insomnia might be a secondary symptom. Patient was provided information about the pathophysiology of  insomnia and psychophysiological factors contributing to the onset and maintenance of insomnia. Treatment options were discussed including component of cognitive-behavioral therapy for insomnia. The benefits and potential early side effects of treatment including increased daytime sleepiness were discussed. Patient was counseled on the importance of avoiding driving if drowsy.See patient instructions for initial treatment recommendations and behavioral sleep plan.   Sleep psychology referral placed.     Restless legs syndrome (RLS), we will check her ferritin and treat with supplemental iron if it is low. We may consider gabapentin or a dopamine agonist if ferritin is normal.     Recommend weight management.  We discussed the link between obesity, sleep apnea, and health outcomes. Weight loss is recommended to address long term effects of obesity and sleep apnea.         Summary Recommendations:  Orders Placed This Encounter   Procedures    Comprehensive Sleep Study    Ferritin    Sleep Psychology  Referral         Summary Counseling:    Sleep Testing Reviewed  Obstructive Sleep Apnea Reviewed  Complications of Untreated Sleep Apnea Reviewed    Medical Decision-making:   Educational materials provided in instructions    Total time spent reviewing medical records, history and physical examination, review of previous testing and interpretation as well as documentation on this date:50 minutes    CC: Christina Barnett          History of Present Illness:     Past Sleep Evaluations:    SLEEP-WAKE SCHEDULE:     Work/School Days: Patient goes to school/work: Yes   Usually gets into bed at 9:00 PM. She is not always sleepy.   Takes patient about 2 hours to fall asleep. Her mind is active.   Has trouble falling asleep 7 nights per week  Wakes up in the middle of the night 4 or 5 times times.  Wakes up due to Use the bathroom;Uncertain  She has trouble falling back asleep 7 times a week.   It usually takes 30 minutes  to get back to sleep  Patient is usually up at 4:30 - 5:00 AM. Ideally wants to wake up at 5 am  Uses alarm: No    Weekends/Non-work Days/All Other Days:  Usually gets into bed at 9:00 PM   Takes patient about 2 hours to fall asleep  Patient is usually up at 4:30 - 5:00 AM  Uses alarm: No    Sleep Need  Patient gets  5-6 hours sleep on average   Patient thinks she needs about 8 hours sleep    Christina Guzman prefers to sleep in this position(s): Side   Patient states they do the following activities in bed: Read    Naps  Patient takes a purposeful nap 1-2 times a week and naps are usually 30 - 60 minutes in duration  She feels better after a nap: No  She dozes off unintentionally 0 days per week  Patient has had a driving accident or near-miss due to sleepiness/drowsiness: No      SLEEP DISRUPTIONS:    Breathing/Snoring  Patient snores:Yes  Other people complain about her snoring: No  Patient has been told she stops breathing in her sleep:No  She has issues with the following: Morning headaches    Movement:  Patient gets pain, discomfort, with an urge to move:  Yes. Makes it hard to fall sleep.   It happens when she is resting:  Yes  It happens more at night:  Yes  Patient has been told she kicks her legs at night:  No     Behaviors in Sleep:  Christina Guzman has experienced the following behaviors while sleeping:    She has experienced sudden muscle weakness during the day: No      Is there anything else you would like your sleep provider to know: No      CAFFEINE AND OTHER SUBSTANCES:    Patient consumes caffeinated beverages per day:  2 cups of tea  Last caffeine use is usually: 3:00 PM  List of any prescribed or over the counter stimulants that patient takes: None  List of any prescribed or over the counter sleep medication patient takes: Melatonin  List of previous sleep medications that patient has tried: None  Patient drinks alcohol to help them sleep: No  Patient drinks alcohol near bedtime:  No    Family History:  Patient has a family member been diagnosed with a sleep disorder: No            SCALES:    EPWORTH SLEEPINESS SCALE         5/8/2024     6:08 AM    [place] Sleepiness Scale ( CHERYL Kinney  [id number]<br>ESS - [place]/English - Final version - 21 Nov 07 - [facility].)   Sitting and reading Slight chance of dozing   Watching TV High chance of dozing   Sitting, inactive in a public place (e.g. a theatre or a meeting) Slight chance of dozing   As a passenger in a car for an hour without a break Moderate chance of dozing   Lying down to rest in the afternoon when circumstances permit Moderate chance of dozing   Sitting and talking to someone Would never doze   Sitting quietly after a lunch without alcohol High chance of dozing   In a car, while stopped for a few minutes in traffic Would never doze   [place] Score (MC) 12   [place] Score (Sleep) 12         INSOMNIA SEVERITY INDEX (DERECK)          5/8/2024     5:57 AM   Insomnia Severity Index (DERECK)   Difficulty falling asleep 3   Difficulty staying asleep 4   Problems waking up too early 4   How SATISFIED/DISSATISFIED are you with your CURRENT sleep pattern? 4   How NOTICEABLE to others do you think your sleep problem is in terms of impairing the quality of your life? 2   How WORRIED/DISTRESSED are you about your current sleep problem? 3   To what extent do you consider your sleep problem to INTERFERE with your daily functioning (e.g. daytime fatigue, mood, ability to function at work/daily chores, concentration, memory, mood, etc.) CURRENTLY? 3   DERECK Total Score 23       Guidelines for Scoring/Interpretation:  Total score categories:  0-7 = No clinically significant insomnia   8-14 = Subthreshold insomnia   15-21 = Clinical insomnia (moderate severity)  22-28 = Clinical insomnia (severe)  Used via courtesy of www.Appatureealth.va.gov with permission from Rene Ireland PhD., [facility]      STOP BANG         5/14/2024     7:50 AM   OVI JAFFE  "Questionnaire (  2008, the American Society of Anesthesiologists, Inc. [employer]   BMI Clinic: 35.19         GAD7        2/11/2022     8:15 AM   CAROLINE-7    1. Feeling nervous, anxious, or on edge 0   2. Not being able to stop or control worrying 0   3. Worrying too much about different things 0   4. Trouble relaxing 0   5. Being so restless that it is hard to sit still 0   6. Becoming easily annoyed or irritable 1   7. Feeling afraid, as if something awful might happen 1   CAROLINE-7 Total Score 2   If you checked any problems, how difficult have they made it for you to do your work, take care of things at home, or get along with other people? Somewhat difficult         CAGE-AID         No data to display                CAGE-AID reprinted with permission from the [place] Medical Journal, HARVEY Hinson. and FABRICIO Tejada, \"Conjoint screening questionnaires for alcohol and drug abuse\" [place] Medical Journal 94: 135-140, 1995.      PATIENT HEALTH QUESTIONNAIRE-9 (PHQ - 9)        2/11/2022     8:15 AM   PHQ-9 ([employer])   1.  Little interest or pleasure in doing things 1   2.  Feeling down, depressed, or hopeless 1   3.  Trouble falling or staying asleep, or sleeping too much 3   4.  Feeling tired or having little energy 3   5.  Poor appetite or overeating 0   6.  Feeling bad about yourself - or that you are a failure or have let yourself or your family down 0   7.  Trouble concentrating on things, such as reading the newspaper or watching television 0   8.  Moving or speaking so slowly that other people could have noticed. Or the opposite - being so fidgety or restless that you have been moving around a lot more than usual 0   9.  Thoughts that you would be better off dead, or of hurting yourself in some way 0   PHQ-9 Total Score 8   If you checked off any problems, how difficult have these problems made it for you to do your work, take care of things at home, or get along with other people? Somewhat " difficult   6.  Feeling bad about yourself 0   7.  Trouble concentrating 0   8.  Moving slowly or restless 0   9.  Suicidal or self-harm thoughts 0   Difficulty at work, home, or with people Somewhat difficult       Developed by [employer] [place], Juliana Healy, Albert Vela and colleagues, with an educational radha from [employer]. No permission required to reproduce, translate, display or distribute.        Allergies:    Allergies   Allergen Reactions    Diatrizoate Hives     Pt reports 30 years ago    Iodinated Contrast Media Hives    Adhesive Tape Other (See Comments)     Burns/red lines/skin reaction    Clindamycin        Medications:    Current Outpatient Medications   Medication Sig Dispense Refill    zolpidem (AMBIEN) 5 MG tablet Take tablet by mouth 15 minutes prior to sleep, for Sleep Study 1 tablet 0    atorvastatin (LIPITOR) 40 MG tablet TAKE 1 TABLET DAILY 90 tablet 1    Blood Pressure Monitoring (BLOOD PRESSURE DIGITAL SOLN) KIT       clobetasol (TEMOVATE) 0.05 % external ointment Apply topically 2 times daily      cyclobenzaprine (FLEXERIL) 10 MG tablet Take 0.5-1 tablets (5-10 mg) by mouth 2 times daily as needed for muscle spasms 90 tablet 0    EPINEPHrine (ANY BX GENERIC EQUIV) 0.3 MG/0.3ML injection 2-pack INJECT 0.3 MLS (0.3 MG) INTO THE MUSCLE ONCE AS NEEDED FOR ANAPHYLAXIS 2 each 2    escitalopram (LEXAPRO) 5 MG tablet Take 2 tablets (10 mg) by mouth daily for 90 days 180 tablet 0    estradiol (ESTRACE) 0.1 MG/GM vaginal cream PLACE 2 GRAMS VAGINALLY 2  TIMES A WEEK 42.5 g 0    omeprazole (PRILOSEC) 40 MG DR capsule Take 1 capsule (40 mg) by mouth daily 30 capsule 1    spacer (OPTICHAMBER DEBBIE) holding chamber Use with inhaler 1 each 0    tirzepatide-Weight Management (ZEPBOUND) 2.5 MG/0.5ML prefilled pen Inject 0.5 mLs (2.5 mg) Subcutaneous every 7 days 3 mL 11    tirzepatide-Weight Management (ZEPBOUND) 5 MG/0.5ML prefilled pen Inject 0.5 mLs (5 mg) Subcutaneous every 7 days 3  mL 5    triamcinolone (KENALOG) 0.1 % external cream Apply topically 2 times daily 15 g 0       Problem List:  Patient Active Problem List    Diagnosis Date Noted    Irritable bowel syndrome with diarrhea 05/12/2024     Priority: Medium    Class 2 severe obesity due to excess calories with serious comorbidity in adult (H) 11/08/2023     Priority: Medium    Vaginal dryness 09/22/2022     Priority: Medium    Lichen sclerosus 09/22/2022     Priority: Medium    Diverticulosis of large intestine without hemorrhage 09/22/2022     Priority: Medium    Advanced directives, counseling/discussion 02/11/2022     Priority: Medium     Pt was given info on ADV. Doris George MA      H/O carotid endarterectomy 10/05/2018     Priority: Medium    TIA (transient ischemic attack) 10/05/2018     Priority: Medium    History of migraine 09/26/2018     Priority: Medium    Carotid stenosis 09/25/2018     Priority: Medium    Diaphragmatic hernia 07/06/2018     Priority: Medium    Low back pain, unspecified back pain laterality, unspecified chronicity, with sciatica presence unspecified 12/19/2017     Priority: Medium    Toe pain 11/14/2014     Priority: Medium    GERD (gastroesophageal reflux disease) 05/07/2013     Priority: Medium    Vitamin D deficiency 03/17/2013     Priority: Medium    Depression 12/11/2012     Priority: Medium    Low back pain 03/01/2012     Priority: Medium     Diagnosis updated by automated process. Provider to review and confirm.      Migraine headache 05/23/2011     Priority: Medium     5/14/2024, rare now.  Patient given Migraine Education folder and Migraine Action Plan on May 23, 2011. Madiha Ly RN           Chronic idiopathic urticaria 10/11/2010     Priority: Medium    House dust mite allergy      Priority: Medium     DM only          Past Medical/Surgical History:  Past Medical History:   Diagnosis Date    Chronic idiopathic urticaria x 9/09    IgE 638, Tryptase=11 (min. elevated)    Fructose disorder      Fructose Malabsorbtion    House dust mite allergy 9/7/10 RAST    DM only    Menopausal depression 2012    Menopausal depression 2012    Menopausal syndrome 2012    Migraines     Skin tag 10/14/2015     Past Surgical History:   Procedure Laterality Date    CAROTID ENDARTERECTOMY  2018    ENT SURGERY  2017    Oral surgery    ORTHOPEDIC SURGERY  2014    bone spur shaved from left big toe    TOE SURGERY      Left foot    ZZC ORAL SURGERY PROCEDURE  2017    Cyst removed from abscess in tooth       Social History:  Social History     Socioeconomic History    Marital status:      Spouse name: Not on file    Number of children: Not on file    Years of education: Not on file    Highest education level: Not on file   Occupational History    Not on file   Tobacco Use    Smoking status: Former     Current packs/day: 0.00     Average packs/day: 0.5 packs/day for 30.0 years (15.0 ttl pk-yrs)     Types: Cigarettes     Start date:      Quit date:      Years since quittin.3    Smokeless tobacco: Never   Vaping Use    Vaping status: Never Used   Substance and Sexual Activity    Alcohol use: No     Comment: occ    Drug use: No    Sexual activity: Yes     Partners: Male     Birth control/protection: Post-menopausal, Male Surgical     Comment:  Vasectomy   Other Topics Concern    Parent/sibling w/ CABG, MI or angioplasty before 65F 55M? No     Service No    Blood Transfusions No    Caffeine Concern No    Occupational Exposure No    Hobby Hazards No    Sleep Concern No    Stress Concern No    Weight Concern Yes    Special Diet No    Back Care No    Exercise Yes    Bike Helmet No    Seat Belt Yes    Self-Exams Yes   Social History Narrative    Not on file     Social Determinants of Health     Financial Resource Strain: Low Risk  (2023)    Financial Resource Strain     Within the past 12 months, have you or your family members you live with been unable to get utilities  (heat, electricity) when it was really needed?: No   Food Insecurity: Low Risk  (2023)    Food Insecurity     Within the past 12 months, did you worry that your food would run out before you got money to buy more?: No     Within the past 12 months, did the food you bought just not last and you didn t have money to get more?: No   Transportation Needs: Low Risk  (2023)    Transportation Needs     Within the past 12 months, has lack of transportation kept you from medical appointments, getting your medicines, non-medical meetings or appointments, work, or from getting things that you need?: No   Physical Activity: Not on file   Stress: Not on file   Social Connections: Not on file   Interpersonal Safety: Low Risk  (2023)    Interpersonal Safety     Do you feel physically and emotionally safe where you currently live?: Yes     Within the past 12 months, have you been hit, slapped, kicked or otherwise physically hurt by someone?: No     Within the past 12 months, have you been humiliated or emotionally abused in other ways by your partner or ex-partner?: No   Housing Stability: Low Risk  (2023)    Housing Stability     Do you have housing? : Yes     Are you worried about losing your housing?: No       Family History:  Family History   Problem Relation Age of Onset    Cancer Mother         OVARIAN,  age 46,48    Other Cancer Mother         Ovarian    Respiratory Father     Cancer Father         Lung    Hypertension Father     Obesity Father     Hyperlipidemia Father     Diabetes Maternal Grandmother     Breast Cancer Maternal Grandmother     Diabetes Maternal Grandfather     Diabetes Paternal Grandmother     Diabetes Paternal Grandfather     Coronary Artery Disease Paternal Grandfather        Review of Systems:  A complete review of systems reviewed by me is negative with the exeption of what has been mentioned in the history of present illness.  In the last TWO WEEKS have you experienced any of  "the following symptoms?  Fevers: No  Night Sweats: No  Weight Gain: No  Pain at Night: No  Double Vision: No  Changes in Vision: No  Difficulty Breathing through Nose: No  Sore Throat in Morning: No  Dry Mouth in the Morning: No  Shortness of Breath Lying Flat: No  Shortness of Breath With Activity: No  Awakening with Shortness of Breath: No  Increased Cough: No  Heart Racing at Night: No  Swelling in Feet or Legs: No  Diarrhea at Night: No  Heartburn at Night: No  Urinating More than Once at Night: No  Losing Control of Urine at Night: No  Joint Pains at Night: No  Headaches in Morning: No  Weakness in Arms or Legs: No  Depressed Mood: No  Anxiety: No     Physical Examination:  Vitals: Ht 1.626 m (5' 4\")   Wt 93 kg (205 lb)   LMP 05/25/2018 (Exact Date)   BMI 35.19 kg/m    BMI= Body mass index is 35.19 kg/m .       GENERAL APPEARANCE: alert and no distress  EYES: Eyes grossly normal to inspection  NECK: no asymmetry, masses, or scars  RESP: breathing is non-labored   NEURO: mentation intact and speech normal  PSYCH: affect normal/bright  Mallampati Class:   Tonsillar Stage:          Data: All pertinent previous laboratory data reviewed     Recent Labs   Lab Test 11/08/23  1540 09/22/22  0812    142   POTASSIUM 4.0 3.5   CHLORIDE 100 106   CO2 28 33*   ANIONGAP 12 3   GLC 91 100*   BUN 11.5 11   CR 0.75 0.96   SUSY 9.4 8.7         Recent Labs   Lab Test 09/22/22  0812   PROTTOTAL 7.1   ALBUMIN 3.7   BILITOTAL 0.8   ALKPHOS 102   AST 17   ALT 28       TSH (mU/L)   Date Value   12/11/2012 3.37   09/07/2010 1.41       MIRACLE Sinha 5/14/2024           "

## 2024-05-20 NOTE — PROGRESS NOTES
"Video-Visit Details    Type of service:  Video Visit    Video Start Time: 9:59 AM  Video End Time: 10:17 AM    Originating Location (pt. Location): Home    Distant Location (provider location):  Offsite (providers home) [facility] [place]     Platform used for Video Visit: [employer]      Weight Management Nutrition Consultation    Christina Guzman is a 58 year old female presents today for return weight management nutrition consultation.  Patient referred by Dr. Cruz on March 25, 2024.    Patient with Co-morbidities of obesity including:      3/24/2024    10:06 AM   --   I have the following health issues associated with obesity Pre-Diabetes    High Cholesterol    GERD (Reflux)   I have the following symptoms associated with obesity Knee Pain    Back Pain    Fatigue    Hip Pain         Anthropometrics:  Initial consult (3/25/24): 214 lbs     Estimated body mass index is 35.02 kg/m  as calculated from the following:    Height as of this encounter: 1.626 m (5' 4\").    Weight as of this encounter: 92.5 kg (204 lb). (-10 lbs in past 2 months)    Medications for Weight Loss:  Zepbound     NUTRITION HISTORY  Food allergies: None  Food intolerances: pork, sugary-processed foods, flavored latte - GI pain, having to rush to the bathroom. Has experienced for past 15 years, has a variety of tests, no significant diagnosis. Will go from being fine to severe gas pain and 1-2 hours later diarrhea. Seems to do better with full-fat dairy in terms of tolerance.   Vitamin/Mineral Supplements: vitamin D, fiber supplement    Previous methods of diet modification for weight loss: Feels like nothing she has tried has made a difference and after 3-4 weeks gives it up. Has tried IF, which she found helpful but not with weight loss. Jan-Feb - Once per week she would go 36 hours fasting but would not lose.  Typically does not eat 3 meals daily. Does not eat during the day if busy, doesn't want to get " sick.  Works a full time job, often 40+ hr/wk, little energy after work.   Preferences: loves abad     Today - Started Zepbound, lost 10 lbs in first few weeks. Has not been able to lose anymore in last couple weeks. Has not been able to get the next higher dose d/t national shortage. Continues 2.5 mg dose.   Has eliminated sweets/desserts/candy, next step for her is to eliminate added sugar from food products. Significantly reduced eating frequency, realized she was snacking a lot during work day.     Recent Diet Recall:  1st meal: 10 am banana/apple/Orange or cheese or mixed nuts   Dinner: 5 pm - Green  meals once weekly;   Beverages: Whole Milk (5 glasses per week), water (32 oz) or tea (occ with cream and stevia/honey). Occ coffee.    Dining Out/take-out: 3 nights per week - [employer], [place]     Progress Towards Previous Goals:  1) Ensure your consuming 60+ gm protein daily (30 gm protein, twice daily)      Physical Activity:  Working on taking a 15 min break to walk at 10 am, 11:30 and 2 pm, however up to this point has worked through all of these reminders.     Nutrition Prescription  Recommended energy/nutrient modification.    Nutrition Diagnosis  Obesity r/t long history of positive energy balance aeb BMI >30. - improving     Nutrition Intervention  Materials/education provided on hypocaloric diet for weight loss. Discussed Volumetric eating to help satiety level on fewer calories; portion control and healthy food choices (Plate Method and Volumetrics handouts), meal and snack planning tips and resources. Provided education on nutrition considerations when starting GLP1 medication including, changes in meal/snack volumes; meeting protein, hydration and micronutrient needs; limiting high-fat foods; and diet/lifestyle strategies for preventing constipation.  Patient demonstrates understanding.  Co-developed goals to work towards.   Provided pt with list of goals and resources below via  Sara.     Expected Engagement: good  Follow-Up Plans: meal/snack planning     Nutrition Goals  1) Ensure your consuming 60+ gm protein daily (30 gm protein, twice daily)     - Fruit, cheese and nuts at breakfast   - Protein shake late afternoon   2) Take a 15 min walk daily       Meal Replacement Shake Options:   *Protein Shake Criteria: no more than 210 Calories, at least 20 grams of protein, and less than 10 grams of sugar   Premier Protein (160 Calories, 30 g protein)  Slim Fast Advanced Nutrition (180 Calories, 20 g protein)  Muscle Milk, lactose-free, 17 oz bottle (210 Calories, 30 g protein)  Integrated Supplements, no artificial sugars (110 Calories, 20 g protein)  Boost/Ensure Max (160 calories, 30 gm protein)   Fairlife Protein Shakes (160-230 calories, 26-42 gm protein)  Aldi's Elevation Protein Powder (180 calories, 30 gm protein)   Orgain Protein Shakes (130-160 calories, 20-26 gm protein)     Tips for eating out:  Skip fried foods.   Aim for balance - starch, protein plus fruit and veggie   Look for dishes that are cooked without cheese, cream or butter, or ask for these items on the side.  Pick entrees using lean protein - chicken, turkey, fish, seafood, eggs, loin/tenderloin cuts of red meat   Pick salads for entree that have nuts and seeds, and/or lean protein   Choose vegetarian choices that don t have too much cheese.  Share an entree, or pack up half right away  Avoid alcohol. Choose water or unsweetened tea over pop and juice/lemonade.      https://www.helpguide.org/articles/healthy-eating/healthier-fast-food.htm    Health Meals Available at [employer]:   https://www.Percello/healthy-meals-chain-restaurants  https://www.Offline Media.com/GeoLearningeats/restaurants/2012/07/healthiest-fast-food-menu-items     Follow-Up:  6-8 weeks, PRN    Time spent with patient: 18 minutes.  Naya Watson RD, LD

## 2024-05-21 ENCOUNTER — VIRTUAL VISIT (OUTPATIENT)
Dept: ENDOCRINOLOGY | Facility: CLINIC | Age: 59
End: 2024-05-21
Payer: COMMERCIAL

## 2024-05-21 VITALS — HEIGHT: 64 IN | BODY MASS INDEX: 34.83 KG/M2 | WEIGHT: 204 LBS

## 2024-05-21 DIAGNOSIS — E66.9 OBESITY: ICD-10-CM

## 2024-05-21 DIAGNOSIS — Z71.3 NUTRITIONAL COUNSELING: Primary | ICD-10-CM

## 2024-05-21 PROCEDURE — 97803 MED NUTRITION INDIV SUBSEQ: CPT | Mod: 95 | Performed by: DIETITIAN, REGISTERED

## 2024-05-21 PROCEDURE — 99207 PR NO CHARGE LOS: CPT | Mod: 95 | Performed by: DIETITIAN, REGISTERED

## 2024-05-21 ASSESSMENT — PAIN SCALES - GENERAL: PAINLEVEL: NO PAIN (0)

## 2024-05-21 NOTE — LETTER
"5/21/2024       RE: Christina Guzman  [address] [facility] [address]     Dear Colleague,    Thank you for referring your patient, Christina Guzman, to the [facility]NA[facility]EMENT [facility] [place] at [facility]. Please see a copy of my visit note below.    Video-Visit Details    Type of service:  Video Visit    Video Start Time: 9:59 AM  Video End Time: 10:17 AM    Originating Location (pt. Location): Home    Distant Location (provider location):  Offsite (providers home) [facility] [place]     Platform used for Video Visit: [employer]      Weight Management Nutrition Consultation    Christina Guzman is a 58 year old female presents today for return weight management nutrition consultation.  Patient referred by Dr. Cruz on March 25, 2024.    Patient with Co-morbidities of obesity including:      3/24/2024    10:06 AM   --   I have the following health issues associated with obesity Pre-Diabetes    High Cholesterol    GERD (Reflux)   I have the following symptoms associated with obesity Knee Pain    Back Pain    Fatigue    Hip Pain         Anthropometrics:  Initial consult (3/25/24): 214 lbs     Estimated body mass index is 35.02 kg/m  as calculated from the following:    Height as of this encounter: 1.626 m (5' 4\").    Weight as of this encounter: 92.5 kg (204 lb). (-10 lbs in past 2 months)    Medications for Weight Loss:  Zepbound     NUTRITION HISTORY  Food allergies: None  Food intolerances: pork, sugary-processed foods, flavored latte - GI pain, having to rush to the bathroom. Has experienced for past 15 years, has a variety of tests, no significant diagnosis. Will go from being fine to severe gas pain and 1-2 hours later diarrhea. Seems to do better with full-fat dairy in terms of tolerance.   Vitamin/Mineral Supplements: vitamin D, fiber supplement    Previous methods of diet modification for " weight loss: Feels like nothing she has tried has made a difference and after 3-4 weeks gives it up. Has tried IF, which she found helpful but not with weight loss. Jan-Feb - Once per week she would go 36 hours fasting but would not lose.  Typically does not eat 3 meals daily. Does not eat during the day if busy, doesn't want to get sick.  Works a full time job, often 40+ hr/wk, little energy after work.   Preferences: loves abad     Today - Started Zepbound, lost 10 lbs in first few weeks. Has not been able to lose anymore in last couple weeks. Has not been able to get the next higher dose d/t national shortage. Continues 2.5 mg dose.   Has eliminated sweets/desserts/candy, next step for her is to eliminate added sugar from food products. Significantly reduced eating frequency, realized she was snacking a lot during work day.     Recent Diet Recall:  1st meal: 10 am banana/apple/Orange or cheese or mixed nuts   Dinner: 5 pm - Green  meals once weekly;   Beverages: Whole Milk (5 glasses per week), water (32 oz) or tea (occ with cream and stevia/honey). Occ coffee.    Dining Out/take-out: 3 nights per week - [employer], [place]     Progress Towards Previous Goals:  1) Ensure your consuming 60+ gm protein daily (30 gm protein, twice daily)      Physical Activity:  Working on taking a 15 min break to walk at 10 am, 11:30 and 2 pm, however up to this point has worked through all of these reminders.     Nutrition Prescription  Recommended energy/nutrient modification.    Nutrition Diagnosis  Obesity r/t long history of positive energy balance aeb BMI >30. - improving     Nutrition Intervention  Materials/education provided on hypocaloric diet for weight loss. Discussed Volumetric eating to help satiety level on fewer calories; portion control and healthy food choices (Plate Method and Volumetrics handouts), meal and snack planning tips and resources. Provided education on nutrition considerations when  starting GLP1 medication including, changes in meal/snack volumes; meeting protein, hydration and micronutrient needs; limiting high-fat foods; and diet/lifestyle strategies for preventing constipation.  Patient demonstrates understanding.  Co-developed goals to work towards.   Provided pt with list of goals and resources below via [employer].     Expected Engagement: good  Follow-Up Plans: meal/snack planning     Nutrition Goals  1) Ensure your consuming 60+ gm protein daily (30 gm protein, twice daily)     - Fruit, cheese and nuts at breakfast   - Protein shake late afternoon   2) Take a 15 min walk daily       Meal Replacement Shake Options:   *Protein Shake Criteria: no more than 210 Calories, at least 20 grams of protein, and less than 10 grams of sugar   Premier Protein (160 Calories, 30 g protein)  Slim Fast Advanced Nutrition (180 Calories, 20 g protein)  Muscle Milk, lactose-free, 17 oz bottle (210 Calories, 30 g protein)  Integrated Supplements, no artificial sugars (110 Calories, 20 g protein)  Boost/Ensure Max (160 calories, 30 gm protein)   Fairlife Protein Shakes (160-230 calories, 26-42 gm protein)  Aldi's Elevation Protein Powder (180 calories, 30 gm protein)   Orgain Protein Shakes (130-160 calories, 20-26 gm protein)     Tips for eating out:  Skip fried foods.   Aim for balance - starch, protein plus fruit and veggie   Look for dishes that are cooked without cheese, cream or butter, or ask for these items on the side.  Pick entrees using lean protein - chicken, turkey, fish, seafood, eggs, loin/tenderloin cuts of red meat   Pick salads for entree that have nuts and seeds, and/or lean protein   Choose vegetarian choices that don t have too much cheese.  Share an entree, or pack up half right away  Avoid alcohol. Choose water or unsweetened tea over pop and juice/lemonade.      https://www.helpguide.org/articles/healthy-eating/healthier-fast-food.htm    Health Meals Available at [employer]:    https://www.Novonics.com/healthy-meals-chain-restaurants  https://www.whoplusyou.com/healthyeats/restaurants/2012/07/healthiest-fast-food-menu-items     Follow-Up:  6-8 weeks, PRN    Time spent with patient: 18 minutes.  Naya Watson RD, LD

## 2024-05-21 NOTE — PATIENT INSTRUCTIONS
Coy Vela,    Follow-up with RD in 6-8 weeks.     Thank you,    Naya Watson, BEVERLY, LD  If you would like to schedule or reschedule an appointment with the RD, please call [phone]    Nutrition Goals  1) Ensure your consuming 60+ gm protein daily (30 gm protein, twice daily)     - Fruit, cheese and nuts at breakfast   - Protein shake late afternoon   2) Take a 15 min walk daily       Meal Replacement Shake Options:   *Protein Shake Criteria: no more than 210 Calories, at least 20 grams of protein, and less than 10 grams of sugar   Premier Protein (160 Calories, 30 g protein)  Slim Fast Advanced Nutrition (180 Calories, 20 g protein)  Muscle Milk, lactose-free, 17 oz bottle (210 Calories, 30 g protein)  Integrated Supplements, no artificial sugars (110 Calories, 20 g protein)  Boost/Ensure Max (160 calories, 30 gm protein)   Fairlife Protein Shakes (160-230 calories, 26-42 gm protein)  Aldi's Elevation Protein Powder (180 calories, 30 gm protein)   Orgain Protein Shakes (130-160 calories, 20-26 gm protein)     Tips for eating out:  Skip fried foods.   Aim for balance - starch, protein plus fruit and veggie   Look for dishes that are cooked without cheese, cream or butter, or ask for these items on the side.  Pick entrees using lean protein - chicken, turkey, fish, seafood, eggs, loin/tenderloin cuts of red meat   Pick salads for entree that have nuts and seeds, and/or lean protein   Choose vegetarian choices that don t have too much cheese.  Share an entree, or pack up half right away  Avoid alcohol. Choose water or unsweetened tea over pop and juice/lemonade.      https://www.helpguide.org/articles/healthy-eating/healthier-fast-food.htm    Health Meals Available at [employer]:   https://www.EnviroMission/healthy-meals-chain-restaurants  https://www.[employer]ams[employer]Vouchercloudeats/restaurants/2012/07/healthiest-fast-food-menu-items       COMPREHENSIVE WEIGHT MANAGEMENT PROGRAM  VIRTUAL SUPPORT GROUPS    At [facility]  [place], our Comprehensive Weight Management program offers on-line support groups for patients who are working on weight loss and considering, preparing for, or have had weight loss surgery.     There is no cost for this opportunity.  You are invited to attend the?Virtual Support Groups?provided by any of the following locations:    [facility] via Microsoft Teams with Mariola Flores RN  2.   [place] via [facility] with Bob Perez, PhD, LP  3.   [place] via [facility] with Jessica Rock RN  4.   [facility] via a Zoom Meeting with JA Talley    The following Support Group information can also be found on our website:  [email or web address]-loss-[email or web address]-groups      [facility] Weight Loss Surgery Support Group  The support group is a patient-lead forum that meets monthly to share experiences, encouragement and education. It is open to those who have had weight loss surgery, are scheduled for surgery, or are considering surgery.   WHEN: This group meets on the 3rd Wednesday of each month from 5:00PM - 6:00PM virtually using Microsoft Teams.   FACILITATOR: Led by Mariola Maradiaga RD, CARSON, RN, the program's Clinical Coordinator.   TO REGISTER: Please contact the clinic via [employer] or call the nurse line directly at [phone] to inform our staff that you would like an invite sent to you and to let us know the email you would like the invite sent to. Prior to the meeting, a link with directions on how to join the meeting will be sent to you.    2023 and 2024 Meetings   December 20  January 17  February 21  March 20  April 17  May 15  Roseann 19      [facility] Bariatric Care Support Group?  This is open to all pre- and post- operative bariatric surgery patients as well as their support system.   WHEN: This group meets the 3rd Tuesday of each month from 6:30  "PM - 8:00 PM virtually using Microsoft Teams.   FACILITATOR: Led by Bob Perez, Ph.D who is a Licensed Psychologist with the [facility] Comprehensive Weight Management Program.   TO REGISTER: Please send an email to Bob Perez, Ph.D.,  [email or web address]?if you would like an invitation to the group. Prior to the meeting, a link with directions on how to join the meeting will be sent to you.    2023 and 2024 Meetings  December 19 January 16: \"Medication Management and Bariatric Surgery\", Barby Scanlon, PharmD, Pharmacy Resident at [facility], [facility]  February 20: \"A Bariatric Surgery Patient's Perspective\", ORESTES Sanabria, [facility], Behavioral Health Clinician at [facility]  March 19  April 16  May 21  Roseann 18: \"Nutritional Labeling\", Dietitian speaker to be announced.  November 19: \"Holiday Eating\", Dietitian speaker to be announced.    [facility] Post-Operative Bariatric Surgery Support Group  This is a support group for [facility] bariatric patients (and those external to [facility]) who have had bariatric surgery and are at least 3 months post-surgery.  WHEN: This support group meets the 4th Wednesday of the month from 11:00 AM - 12:00 PM virtually using Microsoft Teams.   FACILITATOR: Led by Certified Bariatric Nurse, Jessica Rock RN.   TO REGISTER: Please send an email to Jessica at [email or web address] if you would like an invitation to the group.  Prior to the meeting, a link with directions on how to join the meeting will be sent to you.    2023 and 2024 Meetings  December 27  January 24  February 28  March 27  April 24  May 22  Roseann 26    [facility] Healthy Lifestyle Group?  This is a 60 minute virtual coaching group for those who want to lead a healthier lifestyle. Come together to set goals and overcome barriers in a supportive " "group environment. We will address the four pillars of health: nutrition, exercise, sleep and emotional well-being.  This group is highly recommended for those who are participating in the 24 week Healthy Lifestyle Plan and our Health Coaching sessions.  WHEN: This group meets the 1st Friday of the month, 12:30 PM - 1:30 PM online, via a zoom meeting.    FACILITATOR: Led by National Board Certified Health and , Jessica Jordan [facility].   TO REGISTER: Please call the Call Center at [phone] to register.  You will get an appointment to attend in [place]. Fifteen minutes prior to the meeting, complete the e-check in and you will get the link to join the meeting.    There is no charge to attend this group and space is limited.     2023 and 2024 Meetings  December 1: \"Let's Talk\" (guided discussion on our wins and challenges)  January 5: \"New Years Vision: Manifest your Best 2024!\" (guided imagery,  journaling and discussion)  February 2: \"Let's Talk\"  March 1: \"10 Percent Happier\" by Alexx Coreas (Book Bites - a guided discussion on the nuggets of wisdom from favorite wellness books, no need to read the book but highly encouraged)  April 5: \"Let's Talk\"  May 3: \"Essentialism: The Disciplined Pursuit of Less\" by Sampson Morrison (Book Bites discussion)  June 7: \"Let's Talk\"  July 5: NO MEETING, off for the 4th of July Holiday  August 2: \"The Blue Zones, Secrets for Living a Longer Life\" by Alexx Dodge (Book Bites discussion)                    "

## 2024-05-21 NOTE — NURSING NOTE
Is the patient currently in the state of [place]? YES    Visit mode:VIDEO    If the visit is dropped, the patient can be reconnected by: VIDEO VISIT: Text to cell phone:   Telephone Information:   Mobile [phone]       Will anyone else be joining the visit? NO  (If patient encounters technical issues they should call [phone]    How would you like to obtain your AVS? MyChart    Are changes needed to the allergy or medication list? N/A    Are refills needed on medications prescribed by this physician? NO     Reason for visit: RECHECK    Wt other than 24 hrs:    Pain more than one location: no    Malika RANDOLPH

## 2024-06-02 DIAGNOSIS — N95.1 MENOPAUSAL SYNDROME (HOT FLASHES): ICD-10-CM

## 2024-06-02 RX ORDER — ESCITALOPRAM OXALATE 5 MG/1
10 TABLET ORAL DAILY
Qty: 180 TABLET | Refills: 0 | Status: CANCELLED | OUTPATIENT
Start: 2024-06-02

## 2024-06-03 ENCOUNTER — OFFICE VISIT (OUTPATIENT)
Dept: FAMILY MEDICINE | Facility: CLINIC | Age: 59
End: 2024-06-03
Payer: COMMERCIAL

## 2024-06-03 VITALS
OXYGEN SATURATION: 97 % | SYSTOLIC BLOOD PRESSURE: 109 MMHG | TEMPERATURE: 97.7 F | BODY MASS INDEX: 35.03 KG/M2 | DIASTOLIC BLOOD PRESSURE: 75 MMHG | RESPIRATION RATE: 18 BRPM | HEIGHT: 64 IN | HEART RATE: 84 BPM | WEIGHT: 205.2 LBS

## 2024-06-03 DIAGNOSIS — R22.41 MASS OF RIGHT ANKLE: ICD-10-CM

## 2024-06-03 DIAGNOSIS — G45.9 TIA (TRANSIENT ISCHEMIC ATTACK): Chronic | ICD-10-CM

## 2024-06-03 DIAGNOSIS — R22.31 MASS OF RIGHT ELBOW: Primary | ICD-10-CM

## 2024-06-03 PROCEDURE — 99213 OFFICE O/P EST LOW 20 MIN: CPT | Performed by: NURSE PRACTITIONER

## 2024-06-03 RX ORDER — ASPIRIN 81 MG/1
81 TABLET ORAL DAILY
Qty: 90 TABLET | Refills: 3 | Status: SHIPPED | OUTPATIENT
Start: 2024-06-03 | End: 2024-06-27

## 2024-06-03 ASSESSMENT — PAIN SCALES - GENERAL: PAINLEVEL: NO PAIN (0)

## 2024-06-03 NOTE — TELEPHONE ENCOUNTER
"Requested Prescriptions   Pending Prescriptions Disp Refills    escitalopram (LEXAPRO) 5 MG tablet 180 tablet 0     Sig: Take 2 tablets (10 mg) by mouth daily       SSRIs Protocol Passed - 6/2/2024  5:00 PM        Passed - Recent (12 mo) or future (30 days) visit within the authorizing provider's specialty     The patient must have completed an in-person or virtual visit within the past 12 months or has a future visit scheduled within the next 90 days with the authorizing provider s specialty.  Urgent care and e-visits do not quality as an office visit for this protocol.          Passed - Medication is active on med list        Passed - Patient is age 18 or older        Passed - No active pregnancy on record        Passed - No positive pregnancy test in last 12 months           Pt last seen 3/12/2024 for VV for HRT/menopausal symptoms: \"She is on lexapro and no significant SE. Will send in refills for 90 days and she will monitor for SE, effectiveness. If doing well, can refill for entire year.  \"    Last prescribed 3/12/2024 for 180 tablets with 0 refills    Pt states she has not noticed any significant changes and is no longer wishing to take the lexapro. RN closed request    Lorraine Bañuelos RN on 6/3/2024 at 9:54 AM        "

## 2024-06-03 NOTE — PROGRESS NOTES
"  Assessment & Plan     Mass of right elbow  -differentials considered include bursitis vs lipoma  -discussed with patient that if bursitis recommended treatment would be elbow compression sleeve, NSAIDs as needed, avoiding leaning on/putting pressure on elbow.   -if lipoma recommend monitoring, if bothersome can refer to see if surgical removal feasible  - US Upper Extremity Non Vascular Right; Future    Mass of right ankle  -most likely lipoma based on physical exam, discussed treatment options are continuing to monitor or refer for possible removal.    - US Lower Extremity Non Vascular Right; Future    TIA (transient ischemic attack)  -patient stopped taking low dose ASA therapy when she ran out, discussed with patient it is recommended she stay on ASA therapy lifelong to prevent future TIA/strokes.   - aspirin (ASPIRIN LOW DOSE) 81 MG EC tablet; Take 1 tablet (81 mg) by mouth daily for 360 days        BMI  Estimated body mass index is 35.22 kg/m  as calculated from the following:    Height as of this encounter: 1.626 m (5' 4\").    Weight as of this encounter: 93.1 kg (205 lb 3.2 oz).             Remberto Vela is a 58 year old, presenting for the following health issues:  Mass        6/3/2024    10:42 AM   Additional Questions   Roomed by Bia DYKES   Accompanied by self     Right lump over elbow present for 6 months. Bumping it makes it tender. Has gradually been increasing in size.     Second lump on lateral right ankle. This foot will get more cold then her left foot.     No joint pain or stiffness    No unexplained weight loss, recently started Zepbound    Right knee bursitis.     History of Present Illness       Reason for visit:  2 lumps - 1 on my right arm, 1 on my right ankle  Symptom onset:  More than a month  Symptoms include:  Lumps - sometimes pain  Symptom intensity:  Mild  Symptom progression:  Staying the same  Had these symptoms before:  No  What makes it worse:  No  What makes it better:  " "No    She eats 2-3 servings of fruits and vegetables daily.She consumes 0 sweetened beverage(s) daily.She exercises with enough effort to increase her heart rate 9 or less minutes per day.  She exercises with enough effort to increase her heart rate 3 or less days per week.   She is taking medications regularly.             Objective    /75   Pulse 84   Temp 97.7  F (36.5  C) (Tympanic)   Resp 18   Ht 1.626 m (5' 4\")   Wt 93.1 kg (205 lb 3.2 oz)   LMP 05/25/2018 (Exact Date)   SpO2 97%   BMI 35.22 kg/m    Body mass index is 35.22 kg/m .  Physical Exam  Constitutional:       Appearance: Normal appearance.   HENT:      Right Ear: External ear normal.      Left Ear: External ear normal.   Eyes:      Pupils: Pupils are equal, round, and reactive to light.   Cardiovascular:      Rate and Rhythm: Normal rate.   Pulmonary:      Effort: Pulmonary effort is normal.   Musculoskeletal:        Arms:         Legs:    Skin:     General: Skin is warm and dry.   Neurological:      General: No focal deficit present.      Mental Status: She is alert and oriented to person, place, and time.   Psychiatric:         Mood and Affect: Mood normal.         Behavior: Behavior normal.         Thought Content: Thought content normal.         Judgment: Judgment normal.                    Signed Electronically by: MARIANO Soliz CNP    "

## 2024-06-10 ENCOUNTER — ANCILLARY PROCEDURE (OUTPATIENT)
Dept: ULTRASOUND IMAGING | Facility: CLINIC | Age: 59
End: 2024-06-10
Attending: NURSE PRACTITIONER
Payer: COMMERCIAL

## 2024-06-10 DIAGNOSIS — R22.31 MASS OF RIGHT ELBOW: ICD-10-CM

## 2024-06-10 DIAGNOSIS — R22.41 MASS OF RIGHT ANKLE: ICD-10-CM

## 2024-06-10 PROCEDURE — 76882 US LMTD JT/FCL EVL NVASC XTR: CPT | Mod: TC | Performed by: RADIOLOGY

## 2024-06-17 ENCOUNTER — MYC MEDICAL ADVICE (OUTPATIENT)
Dept: FAMILY MEDICINE | Facility: CLINIC | Age: 59
End: 2024-06-17
Payer: COMMERCIAL

## 2024-06-17 DIAGNOSIS — R22.31 MASS OF RIGHT ELBOW: Primary | ICD-10-CM

## 2024-06-17 DIAGNOSIS — R22.41 MASS OF RIGHT ANKLE: ICD-10-CM

## 2024-06-17 PROBLEM — Z71.89 ADVANCED DIRECTIVES, COUNSELING/DISCUSSION: Status: RESOLVED | Noted: 2022-02-11 | Resolved: 2024-06-17

## 2024-06-27 ENCOUNTER — MYC REFILL (OUTPATIENT)
Dept: FAMILY MEDICINE | Facility: CLINIC | Age: 59
End: 2024-06-27
Payer: COMMERCIAL

## 2024-06-27 DIAGNOSIS — G45.9 TIA (TRANSIENT ISCHEMIC ATTACK): Chronic | ICD-10-CM

## 2024-06-27 RX ORDER — ASPIRIN 81 MG/1
81 TABLET ORAL DAILY
Qty: 90 TABLET | Refills: 0 | Status: SHIPPED | OUTPATIENT
Start: 2024-06-27 | End: 2024-09-17

## 2024-07-05 DIAGNOSIS — K21.9 GASTROESOPHAGEAL REFLUX DISEASE WITHOUT ESOPHAGITIS: ICD-10-CM

## 2024-07-05 RX ORDER — OMEPRAZOLE 40 MG/1
40 CAPSULE, DELAYED RELEASE ORAL DAILY
Qty: 90 CAPSULE | Refills: 2 | Status: SHIPPED | OUTPATIENT
Start: 2024-07-05

## 2024-07-29 ENCOUNTER — ANCILLARY PROCEDURE (OUTPATIENT)
Dept: MRI IMAGING | Facility: CLINIC | Age: 59
End: 2024-07-29
Attending: NURSE PRACTITIONER
Payer: COMMERCIAL

## 2024-07-29 ENCOUNTER — VIRTUAL VISIT (OUTPATIENT)
Dept: ENDOCRINOLOGY | Facility: CLINIC | Age: 59
End: 2024-07-29
Payer: COMMERCIAL

## 2024-07-29 VITALS — WEIGHT: 200.8 LBS | BODY MASS INDEX: 34.28 KG/M2 | HEIGHT: 64 IN

## 2024-07-29 DIAGNOSIS — R22.31 MASS OF RIGHT ELBOW: ICD-10-CM

## 2024-07-29 DIAGNOSIS — R22.41 MASS OF RIGHT ANKLE: ICD-10-CM

## 2024-07-29 PROCEDURE — 73221 MRI JOINT UPR EXTREM W/O DYE: CPT | Mod: RT | Performed by: RADIOLOGY

## 2024-07-29 PROCEDURE — 73721 MRI JNT OF LWR EXTRE W/O DYE: CPT | Mod: RT | Performed by: RADIOLOGY

## 2024-07-29 PROCEDURE — 99213 OFFICE O/P EST LOW 20 MIN: CPT | Mod: 95 | Performed by: INTERNAL MEDICINE

## 2024-07-29 ASSESSMENT — PAIN SCALES - GENERAL: PAINLEVEL: NO PAIN (0)

## 2024-07-29 NOTE — LETTER
"2024       RE: Christina Guzman  [address] [facility] [id number]     Dear Colleague,    Thank you for referring your patient, Christina Guzman, to the [facility] [place] at [facility]. Please see a copy of my visit note below.    Virtual Visit Details    Joined the call at 2024, 9:38:56 am.  Left the call at 2024, 9:45:32 am.    Originating Location (pt. Location): Home    Distant Location (provider location):  Off-site  Platform used for Video Visit: [facility] Medical Weight Management Note     Christina Guzman  MRN:  [id number]  :  1965  GABRIELA:  2024    Dear MARIANO Soliz CNP,    I had the pleasure of seeing your patient Christina Guzman.  She is a 59 year old female who I am continuing to see for treatment of obesity related to:      3/24/2024    10:06 AM   --   I have the following health issues associated with obesity Pre-Diabetes    High Cholesterol    GERD (Reflux)   I have the following symptoms associated with obesity Knee Pain    Back Pain    Fatigue    Hip Pain     CURRENT WEIGHT:   200 lbs 12.8 oz    Wt Readings from Last 4 Encounters:   24 91.1 kg (200 lb 12.8 oz)   24 93.1 kg (205 lb 3.2 oz)   24 92.5 kg (204 lb)   24 93 kg (205 lb)       Height:  5' 4\"  Body Mass Index:  Body mass index is 34.47 kg/m .  Vitals:  B/P: Data Unavailable, P: Data Unavailable    Initial consult weight was 214 on 3/25/24.  Weight change since last seen on 3/25/24 is down 14 pounds.   Total loss is 14 pounds.    INTERVAL HISTORY:  Feels effects of Zepbound on reducing sweet cravings and also helps with \"GI issues\". Medication is covered by her insurance ([employer] contract with ).        3/24/2024    10:06 AM   Diet Recall Review with Patient   If you do eat supper, what types of food do you typically eat? Something very quick or already prepared   If you do " snack, what types of food do you typically eat? Something sweet, candy   How many glasses of juice do you drink in a typical day? 0   How many of glasses of milk do you drink in a typical day? 1   If you do drink milk, what type? Whole   How many 8oz glasses of sugar containing drinks such as Jero-Aid/sweet tea do you drink in a day? 0   How many cans/bottles of sugar pop/soda/tea/sports drinks do you drink in a day? 0   How many cans/bottles of diet pop/soda/tea or sports drink do you drink in a day? 0   How often do you have a drink of alcohol? Never     MEDICATIONS:   Current Outpatient Medications   Medication Sig Dispense Refill     aspirin (ASPIRIN LOW DOSE) 81 MG EC tablet Take 1 tablet (81 mg) by mouth daily 90 tablet 0     atorvastatin (LIPITOR) 40 MG tablet TAKE 1 TABLET DAILY 90 tablet 1     Blood Pressure Monitoring (BLOOD PRESSURE DIGITAL SOLN) KIT        clobetasol (TEMOVATE) 0.05 % external ointment Apply topically 2 times daily       cyclobenzaprine (FLEXERIL) 10 MG tablet Take 0.5-1 tablets (5-10 mg) by mouth 2 times daily as needed for muscle spasms 90 tablet 0     EPINEPHrine (ANY BX GENERIC EQUIV) 0.3 MG/0.3ML injection 2-pack INJECT 0.3 MLS (0.3 MG) INTO THE MUSCLE ONCE AS NEEDED FOR ANAPHYLAXIS 2 each 2     escitalopram (LEXAPRO) 5 MG tablet Take 2 tablets (10 mg) by mouth daily for 90 days 180 tablet 0     estradiol (ESTRACE) 0.1 MG/GM vaginal cream PLACE 2 GRAMS VAGINALLY 2  TIMES A WEEK 42.5 g 0     omeprazole (PRILOSEC) 40 MG DR capsule TAKE 1 CAPSULE DAILY 90 capsule 2     tirzepatide-Weight Management (ZEPBOUND) 2.5 MG/0.5ML prefilled pen Inject 0.5 mLs (2.5 mg) Subcutaneous every 7 days 3 mL 11     tirzepatide-Weight Management (ZEPBOUND) 5 MG/0.5ML prefilled pen Inject 0.5 mLs (5 mg) Subcutaneous every 7 days 3 mL 5     triamcinolone (KENALOG) 0.1 % external cream Apply topically 2 times daily 15 g 0     zolpidem (AMBIEN) 5 MG tablet Take tablet by mouth 15 minutes prior to sleep, for  Sleep Study 1 tablet 0     No current facility-administered medications for this visit.         7/25/2024     7:14 PM   Weight Loss Medication History Reviewed With Patient   Are you having any side effects from the weight loss medication that we have prescribed you? No     ASSESSMENT:   Increase dose on Zepbound in support of food change.    FOLLOW-UP:    12 weeks.    Sincerely,  Rene Cruz MD      Again, thank you for allowing me to participate in the care of your patient.      Sincerely,    Rene Cruz MD

## 2024-07-29 NOTE — PROGRESS NOTES
"    Return Medical Weight Management Note     Christina Guzman  MRN:  [id number]  :  1965  GABRIELA:  2024    Dear MARIANO Soliz CNP,    I had the pleasure of seeing your patient Christina Guzman.  She is a 59 year old female who I am continuing to see for treatment of obesity related to:      3/24/2024    10:06 AM   --   I have the following health issues associated with obesity Pre-Diabetes    High Cholesterol    GERD (Reflux)   I have the following symptoms associated with obesity Knee Pain    Back Pain    Fatigue    Hip Pain     CURRENT WEIGHT:   200 lbs 12.8 oz    Wt Readings from Last 4 Encounters:   24 91.1 kg (200 lb 12.8 oz)   24 93.1 kg (205 lb 3.2 oz)   24 92.5 kg (204 lb)   24 93 kg (205 lb)       Height:  5' 4\"  Body Mass Index:  Body mass index is 34.47 kg/m .  Vitals:  B/P: Data Unavailable, P: Data Unavailable    Initial consult weight was 214 on 3/25/24.  Weight change since last seen on 3/25/24 is down 14 pounds.   Total loss is 14 pounds.    INTERVAL HISTORY:  Feels effects of Zepbound on reducing sweet cravings and also helps with \"GI issues\". Medication is covered by her insurance ( contract with ).        3/24/2024    10:06 AM   Diet Recall Review with Patient   If you do eat supper, what types of food do you typically eat? Something very quick or already prepared   If you do snack, what types of food do you typically eat? Something sweet, candy   How many glasses of juice do you drink in a typical day? 0   How many of glasses of milk do you drink in a typical day? 1   If you do drink milk, what type? Whole   How many 8oz glasses of sugar containing drinks such as Jero-Aid/sweet tea do you drink in a day? 0   How many cans/bottles of sugar pop/soda/tea/sports drinks do you drink in a day? 0   How many cans/bottles of diet pop/soda/tea or sports drink do you drink in a day? 0   How often do you have a drink of alcohol? Never     MEDICATIONS: "   Current Outpatient Medications   Medication Sig Dispense Refill    aspirin (ASPIRIN LOW DOSE) 81 MG EC tablet Take 1 tablet (81 mg) by mouth daily 90 tablet 0    atorvastatin (LIPITOR) 40 MG tablet TAKE 1 TABLET DAILY 90 tablet 1    Blood Pressure Monitoring (BLOOD PRESSURE DIGITAL SOLN) KIT       clobetasol (TEMOVATE) 0.05 % external ointment Apply topically 2 times daily      cyclobenzaprine (FLEXERIL) 10 MG tablet Take 0.5-1 tablets (5-10 mg) by mouth 2 times daily as needed for muscle spasms 90 tablet 0    EPINEPHrine (ANY BX GENERIC EQUIV) 0.3 MG/0.3ML injection 2-pack INJECT 0.3 MLS (0.3 MG) INTO THE MUSCLE ONCE AS NEEDED FOR ANAPHYLAXIS 2 each 2    escitalopram (LEXAPRO) 5 MG tablet Take 2 tablets (10 mg) by mouth daily for 90 days 180 tablet 0    estradiol (ESTRACE) 0.1 MG/GM vaginal cream PLACE 2 GRAMS VAGINALLY 2  TIMES A WEEK 42.5 g 0    omeprazole (PRILOSEC) 40 MG DR capsule TAKE 1 CAPSULE DAILY 90 capsule 2    tirzepatide-Weight Management (ZEPBOUND) 2.5 MG/0.5ML prefilled pen Inject 0.5 mLs (2.5 mg) Subcutaneous every 7 days 3 mL 11    tirzepatide-Weight Management (ZEPBOUND) 5 MG/0.5ML prefilled pen Inject 0.5 mLs (5 mg) Subcutaneous every 7 days 3 mL 5    triamcinolone (KENALOG) 0.1 % external cream Apply topically 2 times daily 15 g 0    zolpidem (AMBIEN) 5 MG tablet Take tablet by mouth 15 minutes prior to sleep, for Sleep Study 1 tablet 0     No current facility-administered medications for this visit.         7/25/2024     7:14 PM   Weight Loss Medication History Reviewed With Patient   Are you having any side effects from the weight loss medication that we have prescribed you? No     ASSESSMENT:   Increase dose on Zepbound in support of food change.    FOLLOW-UP:    12 weeks.    Sincerely,  Rene Cruz MD

## 2024-07-29 NOTE — PROGRESS NOTES
Virtual Visit Details    Joined the call at 7/29/2024, 9:38:56 am.  Left the call at 7/29/2024, 9:45:32 am.    Originating Location (pt. Location): Home    Distant Location (provider location):  Off-site  Platform used for Video Visit: Laura

## 2024-08-01 ENCOUNTER — TELEPHONE (OUTPATIENT)
Dept: ENDOCRINOLOGY | Facility: CLINIC | Age: 59
End: 2024-08-01
Payer: COMMERCIAL

## 2024-08-01 NOTE — TELEPHONE ENCOUNTER
Patient confirmed scheduled appointment:  Date: 12/23/24  Time: 930am  Visit type: Return MWM  Provider: Dr Cruz  Location: [facility]

## 2024-09-17 ENCOUNTER — MYC REFILL (OUTPATIENT)
Dept: SLEEP MEDICINE | Facility: CLINIC | Age: 59
End: 2024-09-17
Payer: COMMERCIAL

## 2024-09-17 DIAGNOSIS — G45.9 TIA (TRANSIENT ISCHEMIC ATTACK): Chronic | ICD-10-CM

## 2024-09-17 RX ORDER — ASPIRIN 81 MG/1
81 TABLET, COATED ORAL DAILY
Qty: 90 TABLET | Refills: 0 | Status: SHIPPED | OUTPATIENT
Start: 2024-09-17

## 2024-09-17 NOTE — TELEPHONE ENCOUNTER
Patient requesting Ambien be sent to her pharmacy.  Script was set to print and patients visit was virtual.

## 2024-09-18 ENCOUNTER — MYC MEDICAL ADVICE (OUTPATIENT)
Dept: FAMILY MEDICINE | Facility: CLINIC | Age: 59
End: 2024-09-18
Payer: COMMERCIAL

## 2024-09-18 DIAGNOSIS — R22.41 MASS OF RIGHT ANKLE: Primary | ICD-10-CM

## 2024-09-18 RX ORDER — ZOLPIDEM TARTRATE 5 MG/1
TABLET ORAL
Qty: 1 TABLET | Refills: 0 | Status: SHIPPED | OUTPATIENT
Start: 2024-09-18 | End: 2024-10-02

## 2024-09-19 ENCOUNTER — PATIENT OUTREACH (OUTPATIENT)
Dept: CARE COORDINATION | Facility: CLINIC | Age: 59
End: 2024-09-19
Payer: COMMERCIAL

## 2024-09-19 ENCOUNTER — TELEPHONE (OUTPATIENT)
Dept: PODIATRY | Facility: CLINIC | Age: 59
End: 2024-09-19
Payer: COMMERCIAL

## 2024-09-19 NOTE — TELEPHONE ENCOUNTER
Other: Referral for right ankle mass. Scheduled w/Dr Dorsey checking if that is ok or if she needs to see Ortho Oncology      Could we send this information to you in [place] or would you prefer to receive a phone call?:   Patient would prefer a phone call   Okay to leave a detailed message?: No at Cell number on file:    Telephone Information:   Mobile [phone]

## 2024-09-19 NOTE — TELEPHONE ENCOUNTER
I do not see that this patient has cancer therefore I do not see a reason why she would have to follow-up with Ortho oncology.  I would be happy to see her and discussed the options regarding her ankle.

## 2024-09-20 NOTE — TELEPHONE ENCOUNTER
Relayed message below to patient - patient scheduled for Dr. Dorsey at [place] on 10/02/24 for routine podiatry consult. No further questions/actions needed.

## 2024-09-23 ENCOUNTER — THERAPY VISIT (OUTPATIENT)
Dept: SLEEP MEDICINE | Facility: CLINIC | Age: 59
End: 2024-09-23
Attending: PHYSICIAN ASSISTANT
Payer: COMMERCIAL

## 2024-09-23 DIAGNOSIS — R53.81 MALAISE AND FATIGUE: ICD-10-CM

## 2024-09-23 DIAGNOSIS — G25.81 RESTLESS LEGS SYNDROME (RLS): ICD-10-CM

## 2024-09-23 DIAGNOSIS — R06.89 DYSPNEA AND RESPIRATORY ABNORMALITY: ICD-10-CM

## 2024-09-23 DIAGNOSIS — E66.812 CLASS 2 SEVERE OBESITY DUE TO EXCESS CALORIES WITH SERIOUS COMORBIDITY AND BODY MASS INDEX (BMI) OF 35.0 TO 35.9 IN ADULT (H): ICD-10-CM

## 2024-09-23 DIAGNOSIS — G47.00 INSOMNIA, UNSPECIFIED TYPE: ICD-10-CM

## 2024-09-23 DIAGNOSIS — E66.01 CLASS 2 SEVERE OBESITY DUE TO EXCESS CALORIES WITH SERIOUS COMORBIDITY AND BODY MASS INDEX (BMI) OF 35.0 TO 35.9 IN ADULT (H): ICD-10-CM

## 2024-09-23 DIAGNOSIS — G47.09 OTHER INSOMNIA: ICD-10-CM

## 2024-09-23 DIAGNOSIS — R06.00 DYSPNEA AND RESPIRATORY ABNORMALITY: ICD-10-CM

## 2024-09-23 DIAGNOSIS — G47.33 OSA (OBSTRUCTIVE SLEEP APNEA): Primary | ICD-10-CM

## 2024-09-23 DIAGNOSIS — Z72.820 LACK OF ADEQUATE SLEEP: ICD-10-CM

## 2024-09-23 DIAGNOSIS — R53.83 MALAISE AND FATIGUE: ICD-10-CM

## 2024-09-23 PROCEDURE — 95810 POLYSOM 6/> YRS 4/> PARAM: CPT | Performed by: INTERNAL MEDICINE

## 2024-10-02 ENCOUNTER — OFFICE VISIT (OUTPATIENT)
Dept: PODIATRY | Facility: CLINIC | Age: 59
End: 2024-10-02
Attending: NURSE PRACTITIONER
Payer: COMMERCIAL

## 2024-10-02 VITALS
BODY MASS INDEX: 32.78 KG/M2 | DIASTOLIC BLOOD PRESSURE: 82 MMHG | WEIGHT: 192 LBS | HEIGHT: 64 IN | SYSTOLIC BLOOD PRESSURE: 102 MMHG

## 2024-10-02 DIAGNOSIS — G45.9 TIA (TRANSIENT ISCHEMIC ATTACK): Primary | Chronic | ICD-10-CM

## 2024-10-02 DIAGNOSIS — R22.41 MASS OF RIGHT ANKLE: ICD-10-CM

## 2024-10-02 PROBLEM — I65.29 CAROTID STENOSIS: Status: RESOLVED | Noted: 2018-09-25 | Resolved: 2024-10-02

## 2024-10-02 PROBLEM — Z86.73 HISTORY OF TIA (TRANSIENT ISCHEMIC ATTACK): Status: ACTIVE | Noted: 2018-10-05

## 2024-10-02 PROBLEM — Z86.69 HISTORY OF MIGRAINE: Chronic | Status: ACTIVE | Noted: 2018-09-26

## 2024-10-02 PROBLEM — F51.04 CHRONIC INSOMNIA: Status: ACTIVE | Noted: 2024-10-02

## 2024-10-02 PROBLEM — Z86.73 HISTORY OF TIA (TRANSIENT ISCHEMIC ATTACK): Chronic | Status: ACTIVE | Noted: 2018-10-05

## 2024-10-02 PROBLEM — G47.33 OSA (OBSTRUCTIVE SLEEP APNEA): Chronic | Status: ACTIVE | Noted: 2024-10-02

## 2024-10-02 PROBLEM — G25.81 RESTLESS LEGS SYNDROME (RLS): Status: ACTIVE | Noted: 2024-10-02

## 2024-10-02 PROCEDURE — 99203 OFFICE O/P NEW LOW 30 MIN: CPT | Performed by: PODIATRIST

## 2024-10-02 ASSESSMENT — PAIN SCALES - GENERAL: PAINLEVEL: NO PAIN (0)

## 2024-10-02 NOTE — PROCEDURES
" SLEEP STUDY INTERPRETATION  DIAGNOSTIC POLYSOMNOGRAPHY REPORT      Patient: SUZY LYNN  YOB: 1965  Study Date: 9/23/2024  MRN: [id number]  Referring Provider: Suzy Barnett DO  Ordering Provider: STEPHANIE Mckeon    Indications for Polysomnography: The patient is a 59 year old Female who is 5' 4\" and weighs 205.0 lbs. Her BMI is 35.4, Linden sleepiness scale 12 and neck circumference is 35 cm.   A diagnostic polysomnogram was performed to evaluate for  snoring, non-refreshing sleep, morning headaches, daytime fatigue/sleepiness (ESS 12) and difficulty maintaining sleep     Polysomnogram Data: A full night polysomnogram recorded the standard physiologic parameters including EEG, EOG, EMG, ECG, nasal and oral airflow. Respiratory parameters of chest and abdominal movements were recorded with respiratory inductance plethysmography. Oxygen saturation was recorded by pulse oximetry. Hypopnea scoring rule used: 1B 4%.    Sleep Architecture:    The total recording time of the polysomnogram was 504.6 minutes. The total sleep time was 452.5 minutes. Sleep latency was decreased at 9.2 minutes with the use of a sleep aid (zolpidem). REM latency was 68.0 minutes. Arousal index was 19.0 arousals per hour. Sleep efficiency was normal at 89.7%. Wake after sleep onset was 42.5 minutes. The patient spent 3.3% of total sleep time in Stage N1, 26.0% in Stage N2, 46.4% in Stage N3, and 24.3% in REM. Time in REM supine was 79.5 minutes.        Respiration:    Events ? The polysomnogram revealed a presence of 22 obstructive, 0 central, and 0 mixed apneas resulting in an apnea index of 2.9 events per hour. There were 117 obstructive hypopneas and 0 central hypopneas resulting in an obstructive hypopnea index of 15.5 and central hypopnea index of 0 events per hour. The combined apnea/hypopnea index was 18.4 events per hour (central apnea/hypopnea index was 0 events per hour). The REM AHI was 45.8 events per " hour. The supine AHI was 18.2 events per hour. The RERA index was 3.2 events per hour.  The RDI was 21.6 events per hour.  Snoring - was reported as moderate to loud.  Respiratory rate and pattern - was notable for normal respiratory rate and pattern.  Sustained Sleep Associated Hypoventilation - Transcutaneous carbon dioxide monitoring was not used, however hypoventilation was suggested by oximetry   Sleep Associated Hypoxemia - (Greater than 5 minutes O2 sat at or below 88%) was present. Baseline oxygen saturation was 89.3%. Lowest oxygen saturation was 73.0%. Time spent less than or equal to 88% was 145.8 minutes. Time spent less than or equal to 89% was 266.1 minutes.    Movement Activity:    Periodic Limb Activity - There were 54 PLMs during the entire study. The PLM index was 7.2 movements per hour. The PLM Arousal Index was 1.6 per hour.  REM EMG Activity - Excessive transient/sustained muscle activity was not present.  Nocturnal Behavior - Abnormal sleep related behaviors were not noted    Bruxism - None apparent.        Cardiac Summary:    The average pulse rate was 78.7 bpm. The minimum pulse rate was 64.0 bpm while the maximum pulse rate was 100.0 bpm.  Arrhythmias were not noted.        Assessment:   Moderate obstructive sleep apnea with hypoxemia  Possible hypoventilation     Recommendations:  Recommend repeat polysomnography with TCM and full night titration of positive airway pressure therapy for the control of sleep disordered breathing.  Treatment could be empirically initiated with Auto?titrating PAP therapy with a range of 5 to 15 cmH2O. Recommend clinical follow up with sleep management team including oximetry on PAP  Patient may be a candidate for dental appliance through referral to Sleep Dentistry for the treatment of obstructive sleep apnea and/or socially disruptive snoring.  If devices are not acceptable or effective, patient may benefit from evaluation of possible surgical options. If she  is interested, would recommend referral to specialized ENT-Sleep provider.  Advice regarding the risks of drowsy driving.  Suggest optimizing sleep schedule    Weight management (if BMI > 30).  Pharmacologic therapy should be used for management of restless legs syndrome only if present and clinically indicated and not based on the presence of periodic limb movements alone.    Diagnostic Codes:   Obstructive Sleep Apnea G47.33  Sleep Hypoxemia/Hypoventilation G47.36             _____________________________________   Electronically Signed By: Antonio Eaton MD 10/2/24           Range(%) Time in range (min)   0.0 - 89.0 266.1   0.0 - 88.0 145.8         Stage Min(mm Hg) Max(mm Hg)   Wake - -   NREM(1+2+3) - -   REM - -       Range(mmHg) Time in range (min)   55.0 - 100.0 -   Excluded data <20.0 & >65.0 505.0

## 2024-10-02 NOTE — PROGRESS NOTES
"HPI:  Christina Guzman is a 59 year old female who is seen in consultation at the request of Jessica Recio, MARIANO, CNP    Pt presents for eval of:   (Onset, Location, L/R, Character, Treatments, Injury if yes)    MRI Right ankle 7/29/2024  US Right lower extremity 6/10/2024     Onset early 2024, mass lateral Right ankle. No injury noted. \"My Right foot feels cold internal and falls asleep when I sit for long periods\".  Sharp pain 7/10, tingling when foot falls asleep, lasting a few minutes at a time.  Denies any problems with her back.  This is resolved with walking around.  She has no problems with this when she is walking around or standing at a desk only when she is seated for long periods of time.  She notes it is not painful but she wonders what it is it something that she noticed new.    Works for [employer] as a .       ROS:  10 point ROS neg other than the symptoms noted above in the HPI.    Patient Active Problem List   Diagnosis    Chronic idiopathic urticaria    House dust mite allergy    Migraine headache    Low back pain    Depression    Vitamin D deficiency    GERD (gastroesophageal reflux disease)    Toe pain    Low back pain, unspecified back pain laterality, unspecified chronicity, with sciatica presence unspecified    H/O carotid endarterectomy    TIA (transient ischemic attack)    Vaginal dryness    Lichen sclerosus    Diverticulosis of large intestine without hemorrhage    Class 2 severe obesity due to excess calories with serious comorbidity in adult (H)    Carotid stenosis    Diaphragmatic hernia    Irritable bowel syndrome with diarrhea    History of migraine       PAST MEDICAL HISTORY:   Past Medical History:   Diagnosis Date    Chronic idiopathic urticaria x 9/09    IgE 638, Tryptase=11 (min. elevated)    Fructose disorder     Fructose Malabsorbtion    House dust mite allergy 9/7/10 RAST    DM only    Menopausal depression 12/11/2012    Menopausal depression 12/11/2012    " Menopausal syndrome 12/11/2012    Migraines     Skin tag 10/14/2015        PAST SURGICAL HISTORY:   Past Surgical History:   Procedure Laterality Date    CAROTID ENDARTERECTOMY  2018    ENT SURGERY  04/01/2017    Oral surgery    ORTHOPEDIC SURGERY  12/2014    bone spur shaved from left big toe    TOE SURGERY      Left foot    [facility] ORAL SURGERY PROCEDURE  04/03/2017    Cyst removed from abscess in tooth        MEDICATIONS:   Current Outpatient Medications:     ASPIRIN LOW DOSE 81 MG EC tablet, TAKE 1 TABLET DAILY, Disp: 90 tablet, Rfl: 0    atorvastatin (LIPITOR) 40 MG tablet, TAKE 1 TABLET DAILY, Disp: 90 tablet, Rfl: 1    Blood Pressure Monitoring (BLOOD PRESSURE DIGITAL SOLN) KIT, , Disp: , Rfl:     clobetasol (TEMOVATE) 0.05 % external ointment, Apply topically 2 times daily, Disp: , Rfl:     cyclobenzaprine (FLEXERIL) 10 MG tablet, Take 0.5-1 tablets (5-10 mg) by mouth 2 times daily as needed for muscle spasms, Disp: 90 tablet, Rfl: 0    estradiol (ESTRACE) 0.1 MG/GM vaginal cream, PLACE 2 GRAMS VAGINALLY 2  TIMES A WEEK, Disp: 42.5 g, Rfl: 0    omeprazole (PRILOSEC) 40 MG DR capsule, TAKE 1 CAPSULE DAILY, Disp: 90 capsule, Rfl: 2    tirzepatide-Weight Management (ZEPBOUND) 5 MG/0.5ML prefilled pen, Inject 0.5 mLs (5 mg) Subcutaneous every 7 days, Disp: 3 mL, Rfl: 5    triamcinolone (KENALOG) 0.1 % external cream, Apply topically 2 times daily, Disp: 15 g, Rfl: 0    EPINEPHrine (ANY BX GENERIC EQUIV) 0.3 MG/0.3ML injection 2-pack, INJECT 0.3 MLS (0.3 MG) INTO THE MUSCLE ONCE AS NEEDED FOR ANAPHYLAXIS (Patient not taking: Reported on 10/2/2024), Disp: 2 each, Rfl: 2    escitalopram (LEXAPRO) 5 MG tablet, Take 2 tablets (10 mg) by mouth daily for 90 days, Disp: 180 tablet, Rfl: 0    tirzepatide-Weight Management (ZEPBOUND) 2.5 MG/0.5ML prefilled pen, Inject 0.5 mLs (2.5 mg) Subcutaneous every 7 days (Patient not taking: Reported on 10/2/2024), Disp: 3 mL, Rfl: 11    tirzepatide-Weight Management (ZEPBOUND) 7.5  MG/0.5ML prefilled pen, Inject 0.5 mLs (7.5 mg) subcutaneously every 7 days (Patient not taking: Reported on 10/2/2024), Disp: 0.5 mL, Rfl: 11     ALLERGIES:    Allergies   Allergen Reactions    Diatrizoate Hives     Pt reports 30 years ago    Iodinated Contrast Media Hives    Adhesive Tape Other (See Comments)     Burns/red lines/skin reaction    Clindamycin         SOCIAL HISTORY:   Social History     Socioeconomic History    Marital status:      Spouse name: Not on file    Number of children: Not on file    Years of education: Not on file    Highest education level: Not on file   Occupational History    Not on file   Tobacco Use    Smoking status: Former     Current packs/day: 0.00     Average packs/day: 0.5 packs/day for 30.0 years (15.0 ttl pk-yrs)     Types: Cigarettes     Start date:      Quit date: 2018     Years since quittin.7    Smokeless tobacco: Never   Vaping Use    Vaping status: Never Used   Substance and Sexual Activity    Alcohol use: No     Comment: occ    Drug use: No    Sexual activity: Yes     Partners: Male     Birth control/protection: Post-menopausal, Male Surgical     Comment:  Vasectomy   Other Topics Concern    Parent/sibling w/ CABG, MI or angioplasty before 65F 55M? No     Service No    Blood Transfusions No    Caffeine Concern No    Occupational Exposure No    Hobby Hazards No    Sleep Concern No    Stress Concern No    Weight Concern Yes    Special Diet No    Back Care No    Exercise Yes    Bike Helmet No    Seat Belt Yes    Self-Exams Yes   Social History Narrative    Not on file     Social Determinants of Health     Financial Resource Strain: Low Risk  (2023)    Financial Resource Strain     Within the past 12 months, have you or your family members you live with been unable to get utilities (heat, electricity) when it was really needed?: No   Food Insecurity: Low Risk  (2023)    Food Insecurity     Within the past 12 months, did you worry  "that your food would run out before you got money to buy more?: No     Within the past 12 months, did the food you bought just not last and you didn t have money to get more?: No   Transportation Needs: Low Risk  (2023)    Transportation Needs     Within the past 12 months, has lack of transportation kept you from medical appointments, getting your medicines, non-medical meetings or appointments, work, or from getting things that you need?: No   Physical Activity: Not on file   Stress: Not on file   Social Connections: Not on file   Interpersonal Safety: Low Risk  (2023)    Interpersonal Safety     Do you feel physically and emotionally safe where you currently live?: Yes     Within the past 12 months, have you been hit, slapped, kicked or otherwise physically hurt by someone?: No     Within the past 12 months, have you been humiliated or emotionally abused in other ways by your partner or ex-partner?: No   Housing Stability: Low Risk  (2023)    Housing Stability     Do you have housing? : Yes     Are you worried about losing your housing?: No        FAMILY HISTORY:   Family History   Problem Relation Age of Onset    Cancer Mother         OVARIAN,  age 46,48    Other Cancer Mother         Ovarian    Respiratory Father     Cancer Father         Lung    Hypertension Father     Obesity Father     Hyperlipidemia Father     Diabetes Maternal Grandmother     Breast Cancer Maternal Grandmother     Diabetes Maternal Grandfather     Diabetes Paternal Grandmother     Diabetes Paternal Grandfather     Coronary Artery Disease Paternal Grandfather         EXAM:Vitals: /82 (BP Location: Left arm, Patient Position: Sitting, Cuff Size: Adult Large)   Ht 1.626 m (5' 4\")   Wt 87.1 kg (192 lb)   LMP 2018 (Exact Date)   BMI 32.96 kg/m    BMI= Body mass index is 32.96 kg/m .    General appearance: Patient is alert and fully cooperative with history & exam.  No sign of distress is noted during the " visit.     Psychiatric: Affect is pleasant & appropriate.  Patient appears motivated to improve health.     Respiratory: Breathing is regular & unlabored while sitting.     HEENT: Hearing is intact to spoken word.  Speech is clear.  No gross evidence of visual impairment that would impact ambulation.     Vascular: DP & PT pulses are intact & regular bilaterally.  No significant edema or varicosities noted.  CFT and skin temperature is normal to both lower extremities.     Neurologic: Lower extremity sensation is intact to light touch.  No evidence of weakness or contracture in the lower extremities.  No evidence of neuropathy.    Dermatologic: Skin is intact to both lower extremities with adequate texture, turgor and tone about the integument.  No paronychia or evidence of soft tissue infection is noted.     Musculoskeletal: Patient is ambulatory without assistive device or brace.  Mild generalized valgus noted upon weightbearing.  There is subtle prominence through the sinus tarsi right greater than left.  This is consistent with normal fat and subtle venous stasis edema of the lower extremities.  About 2 or 3 mm of pitting edema throughout both legs.  Minor varicosities noted.  Temperature is warm to cool proximal to distal.  CFT is immediate.    MRI right ankle demonstrates no pathology or acute reaction.  No marrow reaction.  No ankle injury.       ASSESSMENT:       ICD-10-CM    1. TIA (transient ischemic attack)  G45.9       2. Mass of right ankle  R22.41 Orthopedic  Referral           PLAN:  Reviewed patient's chart in [facility].      10/2/2024   This is essentially a normal exam.  She does have very minor degeneration and associated lower extremity venous stasis edema.  Offered ABIs and noninvasive vascular exam arterial ultrasound however she does not feel her symptoms warrant further evaluation of her blood flow.  She does have palpable pulses.  Encourage compression stockings and compression about her  ankle to reduce further prominence about the sinus tarsi.  Recommend moving more throughout her day.  May also consider sitting on a ball instead of sitting in a chair.  May also consider walking more frequently or standing at her desk.  Written shoe gear instructions dispensed.    All questions were answered to her satisfaction.  She will follow-up if she has any further questions or if symptoms persist      Bereket Dorsey DPM

## 2024-10-02 NOTE — PATIENT INSTRUCTIONS
Reliable shoe stores: To maximize your experience and provide the best possible fit.  Be sure to show them your foot concerns and tell them Dr. Dorsey sent you.      Stores listed in bold have only athletic shoes, and stores that are not bold are mostly casual or variety of shoes    [employer]  [address] [place] [address]  [phone]    [employer] [facility] [place]  [address] [place] [address]  [phone]     [employer] [facility]  [address] [place] [address]  [phone]    [employer]  [address] [facility] [address]  [phone]    [employer]  [address] [place], [place] [address] [phone]    [employer]  [address] [place] [address]  [phone]                         [employer] Locations:     [address] [place] [address]   [phone]     [address] [place] [address] [address] [place] [address]   [phone]     [address] [place] [address]   [phone]     [address] [facility] [place] [address]   [phone]     [address] [place] [address]   [phone]     [address] [place] [address]   [phone]     [address]   [place] [address]   [phone]     [address] [facility]   [place] [address]   [phone]             How to find reasonable shoes          The correct width    Correct Fitting    Correct Length      Foot Distortion    Posture Distortion                          Torsional Rigidity      Grasp behind the heel and underneath the foot and twist      Bad    Excessive torsion/twist in midfoot     Less torsion/twist in midfoot is better                   Heel Counter Rigidity      Grasp just above   midsole and squeeze      Bad    Soft heel counter      Good    Rigid Heel Counter      Flexion Rigidity      Grasp shoe and bend from forefoot to rearfoot

## 2024-10-02 NOTE — LETTER
"10/2/2024      Christina Guzman  [address] [facility] [id number]      Dear Colleague,    Thank you for referring your patient, Christina Guzman, to the [facility]. Please see a copy of my visit note below.    HPI:  Christina Guzman is a 59 year old female who is seen in consultation at the request of Jessica Recio, APRN, CNP    Pt presents for eval of:   (Onset, Location, L/R, Character, Treatments, Injury if yes)    MRI Right ankle 7/29/2024  US Right lower extremity 6/10/2024     Onset early 2024, mass lateral Right ankle. No injury noted. \"My Right foot feels cold internal and falls asleep when I sit for long periods\".  Sharp pain 7/10, tingling when foot falls asleep, lasting a few minutes at a time.  Denies any problems with her back.  This is resolved with walking around.  She has no problems with this when she is walking around or standing at a desk only when she is seated for long periods of time.  She notes it is not painful but she wonders what it is it something that she noticed new.    Works for [employer] as a .       ROS:  10 point ROS neg other than the symptoms noted above in the HPI.    Patient Active Problem List   Diagnosis     Chronic idiopathic urticaria     House dust mite allergy     Migraine headache     Low back pain     Depression     Vitamin D deficiency     GERD (gastroesophageal reflux disease)     Toe pain     Low back pain, unspecified back pain laterality, unspecified chronicity, with sciatica presence unspecified     H/O carotid endarterectomy     TIA (transient ischemic attack)     Vaginal dryness     Lichen sclerosus     Diverticulosis of large intestine without hemorrhage     Class 2 severe obesity due to excess calories with serious comorbidity in adult (H)     Carotid stenosis     Diaphragmatic hernia     Irritable bowel syndrome with diarrhea     History of migraine       PAST MEDICAL HISTORY:   Past Medical History:   Diagnosis Date "     Chronic idiopathic urticaria x 9/09    IgE 638, Tryptase=11 (min. elevated)     Fructose disorder     Fructose Malabsorbtion     House dust mite allergy 9/7/10 RAST    DM only     Menopausal depression 12/11/2012     Menopausal depression 12/11/2012     Menopausal syndrome 12/11/2012     Migraines      Skin tag 10/14/2015        PAST SURGICAL HISTORY:   Past Surgical History:   Procedure Laterality Date     CAROTID ENDARTERECTOMY  2018     ENT SURGERY  04/01/2017    Oral surgery     ORTHOPEDIC SURGERY  12/2014    bone spur shaved from left big toe     TOE SURGERY      Left foot     ZZC ORAL SURGERY PROCEDURE  04/03/2017    Cyst removed from abscess in tooth        MEDICATIONS:   Current Outpatient Medications:      ASPIRIN LOW DOSE 81 MG EC tablet, TAKE 1 TABLET DAILY, Disp: 90 tablet, Rfl: 0     atorvastatin (LIPITOR) 40 MG tablet, TAKE 1 TABLET DAILY, Disp: 90 tablet, Rfl: 1     Blood Pressure Monitoring (BLOOD PRESSURE DIGITAL SOLN) KIT, , Disp: , Rfl:      clobetasol (TEMOVATE) 0.05 % external ointment, Apply topically 2 times daily, Disp: , Rfl:      cyclobenzaprine (FLEXERIL) 10 MG tablet, Take 0.5-1 tablets (5-10 mg) by mouth 2 times daily as needed for muscle spasms, Disp: 90 tablet, Rfl: 0     estradiol (ESTRACE) 0.1 MG/GM vaginal cream, PLACE 2 GRAMS VAGINALLY 2  TIMES A WEEK, Disp: 42.5 g, Rfl: 0     omeprazole (PRILOSEC) 40 MG DR capsule, TAKE 1 CAPSULE DAILY, Disp: 90 capsule, Rfl: 2     tirzepatide-Weight Management (ZEPBOUND) 5 MG/0.5ML prefilled pen, Inject 0.5 mLs (5 mg) Subcutaneous every 7 days, Disp: 3 mL, Rfl: 5     triamcinolone (KENALOG) 0.1 % external cream, Apply topically 2 times daily, Disp: 15 g, Rfl: 0     EPINEPHrine (ANY BX GENERIC EQUIV) 0.3 MG/0.3ML injection 2-pack, INJECT 0.3 MLS (0.3 MG) INTO THE MUSCLE ONCE AS NEEDED FOR ANAPHYLAXIS (Patient not taking: Reported on 10/2/2024), Disp: 2 each, Rfl: 2     escitalopram (LEXAPRO) 5 MG tablet, Take 2 tablets (10 mg) by mouth daily  for 90 days, Disp: 180 tablet, Rfl: 0     tirzepatide-Weight Management (ZEPBOUND) 2.5 MG/0.5ML prefilled pen, Inject 0.5 mLs (2.5 mg) Subcutaneous every 7 days (Patient not taking: Reported on 10/2/2024), Disp: 3 mL, Rfl: 11     tirzepatide-Weight Management (ZEPBOUND) 7.5 MG/0.5ML prefilled pen, Inject 0.5 mLs (7.5 mg) subcutaneously every 7 days (Patient not taking: Reported on 10/2/2024), Disp: 0.5 mL, Rfl: 11     ALLERGIES:    Allergies   Allergen Reactions     Diatrizoate Hives     Pt reports 30 years ago     Iodinated Contrast Media Hives     Adhesive Tape Other (See Comments)     Burns/red lines/skin reaction     Clindamycin         SOCIAL HISTORY:   Social History     Socioeconomic History     Marital status:      Spouse name: Not on file     Number of children: Not on file     Years of education: Not on file     Highest education level: Not on file   Occupational History     Not on file   Tobacco Use     Smoking status: Former     Current packs/day: 0.00     Average packs/day: 0.5 packs/day for 30.0 years (15.0 ttl pk-yrs)     Types: Cigarettes     Start date:      Quit date: 2018     Years since quittin.7     Smokeless tobacco: Never   Vaping Use     Vaping status: Never Used   Substance and Sexual Activity     Alcohol use: No     Comment: occ     Drug use: No     Sexual activity: Yes     Partners: Male     Birth control/protection: Post-menopausal, Male Surgical     Comment:  Vasectomy   Other Topics Concern     Parent/sibling w/ CABG, MI or angioplasty before 65F 55M? No      Service No     Blood Transfusions No     Caffeine Concern No     Occupational Exposure No     Hobby Hazards No     Sleep Concern No     Stress Concern No     Weight Concern Yes     Special Diet No     Back Care No     Exercise Yes     Bike Helmet No     Seat Belt Yes     Self-Exams Yes   Social History Narrative     Not on file     Social Determinants of Health     Financial Resource Strain: Low Risk   (2023)    Financial Resource Strain      Within the past 12 months, have you or your family members you live with been unable to get utilities (heat, electricity) when it was really needed?: No   Food Insecurity: Low Risk  (2023)    Food Insecurity      Within the past 12 months, did you worry that your food would run out before you got money to buy more?: No      Within the past 12 months, did the food you bought just not last and you didn t have money to get more?: No   Transportation Needs: Low Risk  (2023)    Transportation Needs      Within the past 12 months, has lack of transportation kept you from medical appointments, getting your medicines, non-medical meetings or appointments, work, or from getting things that you need?: No   Physical Activity: Not on file   Stress: Not on file   Social Connections: Not on file   Interpersonal Safety: Low Risk  (2023)    Interpersonal Safety      Do you feel physically and emotionally safe where you currently live?: Yes      Within the past 12 months, have you been hit, slapped, kicked or otherwise physically hurt by someone?: No      Within the past 12 months, have you been humiliated or emotionally abused in other ways by your partner or ex-partner?: No   Housing Stability: Low Risk  (2023)    Housing Stability      Do you have housing? : Yes      Are you worried about losing your housing?: No        FAMILY HISTORY:   Family History   Problem Relation Age of Onset     Cancer Mother         OVARIAN,  age 46,48     Other Cancer Mother         Ovarian     Respiratory Father      Cancer Father         Lung     Hypertension Father      Obesity Father      Hyperlipidemia Father      Diabetes Maternal Grandmother      Breast Cancer Maternal Grandmother      Diabetes Maternal Grandfather      Diabetes Paternal Grandmother      Diabetes Paternal Grandfather      Coronary Artery Disease Paternal Grandfather         EXAM:Vitals: /82 (BP Location:  "Left arm, Patient Position: Sitting, Cuff Size: Adult Large)   Ht 1.626 m (5' 4\")   Wt 87.1 kg (192 lb)   LMP 05/25/2018 (Exact Date)   BMI 32.96 kg/m    BMI= Body mass index is 32.96 kg/m .    General appearance: Patient is alert and fully cooperative with history & exam.  No sign of distress is noted during the visit.     Psychiatric: Affect is pleasant & appropriate.  Patient appears motivated to improve health.     Respiratory: Breathing is regular & unlabored while sitting.     HEENT: Hearing is intact to spoken word.  Speech is clear.  No gross evidence of visual impairment that would impact ambulation.     Vascular: DP & PT pulses are intact & regular bilaterally.  No significant edema or varicosities noted.  CFT and skin temperature is normal to both lower extremities.     Neurologic: Lower extremity sensation is intact to light touch.  No evidence of weakness or contracture in the lower extremities.  No evidence of neuropathy.    Dermatologic: Skin is intact to both lower extremities with adequate texture, turgor and tone about the integument.  No paronychia or evidence of soft tissue infection is noted.     Musculoskeletal: Patient is ambulatory without assistive device or brace.  Mild generalized valgus noted upon weightbearing.  There is subtle prominence through the sinus tarsi right greater than left.  This is consistent with normal fat and subtle venous stasis edema of the lower extremities.  About 2 or 3 mm of pitting edema throughout both legs.  Minor varicosities noted.  Temperature is warm to cool proximal to distal.  CFT is immediate.    MRI right ankle demonstrates no pathology or acute reaction.  No marrow reaction.  No ankle injury.       ASSESSMENT:       ICD-10-CM    1. TIA (transient ischemic attack)  G45.9       2. Mass of right ankle  R22.41 Orthopedic  Referral           PLAN:  Reviewed patient[facility] chart in [facility].      10/2/2024   This is essentially a normal exam.  She does " have very minor degeneration and associated lower extremity venous stasis edema.  Offered ABIs and noninvasive vascular exam arterial ultrasound however she does not feel her symptoms warrant further evaluation of her blood flow.  She does have palpable pulses.  Encourage compression stockings and compression about her ankle to reduce further prominence about the sinus tarsi.  Recommend moving more throughout her day.  May also consider sitting on a ball instead of sitting in a chair.  May also consider walking more frequently or standing at her desk.  Written shoe gear instructions dispensed.    All questions were answered to her satisfaction.  She will follow-up if she has any further questions or if symptoms persist      Bereket Dorsey DPM      Again, thank you for allowing me to participate in the care of your patient.        Sincerely,        Bereket Dorsey DPM

## 2024-10-04 LAB — SLPCOMP: NORMAL

## 2024-10-09 ENCOUNTER — PATIENT OUTREACH (OUTPATIENT)
Dept: CARE COORDINATION | Facility: CLINIC | Age: 59
End: 2024-10-09
Payer: COMMERCIAL

## 2024-10-11 DIAGNOSIS — E78.5 HYPERLIPIDEMIA LDL GOAL <130: ICD-10-CM

## 2024-10-11 RX ORDER — ATORVASTATIN CALCIUM 40 MG/1
TABLET, FILM COATED ORAL
Qty: 90 TABLET | Refills: 1 | Status: SHIPPED | OUTPATIENT
Start: 2024-10-11

## 2024-10-23 ENCOUNTER — PATIENT OUTREACH (OUTPATIENT)
Dept: CARE COORDINATION | Facility: CLINIC | Age: 59
End: 2024-10-23
Payer: COMMERCIAL

## 2024-11-06 ENCOUNTER — OFFICE VISIT (OUTPATIENT)
Dept: FAMILY MEDICINE | Facility: CLINIC | Age: 59
End: 2024-11-06
Payer: COMMERCIAL

## 2024-11-06 ENCOUNTER — TELEPHONE (OUTPATIENT)
Dept: ENDOCRINOLOGY | Facility: CLINIC | Age: 59
End: 2024-11-06

## 2024-11-06 VITALS
HEART RATE: 85 BPM | TEMPERATURE: 97.1 F | OXYGEN SATURATION: 99 % | BODY MASS INDEX: 32.88 KG/M2 | SYSTOLIC BLOOD PRESSURE: 115 MMHG | HEIGHT: 64 IN | DIASTOLIC BLOOD PRESSURE: 76 MMHG | RESPIRATION RATE: 20 BRPM | WEIGHT: 192.6 LBS

## 2024-11-06 DIAGNOSIS — Z88.9 HISTORY OF ALLERGIC REACTION: ICD-10-CM

## 2024-11-06 DIAGNOSIS — N95.2 VAGINAL ATROPHY: ICD-10-CM

## 2024-11-06 DIAGNOSIS — E66.01 CLASS 2 SEVERE OBESITY DUE TO EXCESS CALORIES WITH SERIOUS COMORBIDITY AND BODY MASS INDEX (BMI) OF 35.0 TO 35.9 IN ADULT (H): ICD-10-CM

## 2024-11-06 DIAGNOSIS — E66.812 CLASS 2 SEVERE OBESITY DUE TO EXCESS CALORIES WITH SERIOUS COMORBIDITY AND BODY MASS INDEX (BMI) OF 35.0 TO 35.9 IN ADULT (H): ICD-10-CM

## 2024-11-06 DIAGNOSIS — M54.50 CHRONIC LEFT-SIDED LOW BACK PAIN, UNSPECIFIED WHETHER SCIATICA PRESENT: ICD-10-CM

## 2024-11-06 DIAGNOSIS — E55.9 VITAMIN D DEFICIENCY: ICD-10-CM

## 2024-11-06 DIAGNOSIS — L90.0 LICHEN SCLEROSUS: ICD-10-CM

## 2024-11-06 DIAGNOSIS — G89.29 CHRONIC LEFT-SIDED LOW BACK PAIN, UNSPECIFIED WHETHER SCIATICA PRESENT: ICD-10-CM

## 2024-11-06 DIAGNOSIS — Z00.00 ROUTINE GENERAL MEDICAL EXAMINATION AT A HEALTH CARE FACILITY: Primary | ICD-10-CM

## 2024-11-06 DIAGNOSIS — G45.9 TIA (TRANSIENT ISCHEMIC ATTACK): Chronic | ICD-10-CM

## 2024-11-06 DIAGNOSIS — E78.5 HYPERLIPIDEMIA LDL GOAL <130: ICD-10-CM

## 2024-11-06 PROCEDURE — 99214 OFFICE O/P EST MOD 30 MIN: CPT | Mod: 25 | Performed by: NURSE PRACTITIONER

## 2024-11-06 PROCEDURE — 99396 PREV VISIT EST AGE 40-64: CPT | Mod: 25 | Performed by: NURSE PRACTITIONER

## 2024-11-06 PROCEDURE — 90480 ADMN SARSCOV2 VAC 1/ONLY CMP: CPT | Performed by: NURSE PRACTITIONER

## 2024-11-06 PROCEDURE — 91320 SARSCV2 VAC 30MCG TRS-SUC IM: CPT | Performed by: NURSE PRACTITIONER

## 2024-11-06 PROCEDURE — 90471 IMMUNIZATION ADMIN: CPT | Performed by: NURSE PRACTITIONER

## 2024-11-06 PROCEDURE — 90673 RIV3 VACCINE NO PRESERV IM: CPT | Performed by: NURSE PRACTITIONER

## 2024-11-06 RX ORDER — EPINEPHRINE 0.3 MG/.3ML
0.3 INJECTION SUBCUTANEOUS
Qty: 2 EACH | Refills: 2 | Status: SHIPPED | OUTPATIENT
Start: 2024-11-06

## 2024-11-06 RX ORDER — LORAZEPAM 1 MG/1
TABLET ORAL
COMMUNITY
Start: 2024-10-18

## 2024-11-06 RX ORDER — ESTRADIOL 0.1 MG/G
CREAM VAGINAL
Qty: 42.5 G | Refills: 2 | Status: SHIPPED | OUTPATIENT
Start: 2024-11-06

## 2024-11-06 RX ORDER — CYCLOBENZAPRINE HCL 10 MG
5-10 TABLET ORAL 2 TIMES DAILY PRN
Qty: 90 TABLET | Refills: 3 | Status: SHIPPED | OUTPATIENT
Start: 2024-11-06

## 2024-11-06 RX ORDER — ASPIRIN 81 MG/1
81 TABLET ORAL DAILY
Qty: 90 TABLET | Refills: 3 | Status: SHIPPED | OUTPATIENT
Start: 2024-11-06

## 2024-11-06 RX ORDER — CLOBETASOL PROPIONATE 0.5 MG/G
OINTMENT TOPICAL 2 TIMES DAILY
Qty: 45 G | Refills: 3 | Status: SHIPPED | OUTPATIENT
Start: 2024-11-06

## 2024-11-06 SDOH — HEALTH STABILITY: PHYSICAL HEALTH: ON AVERAGE, HOW MANY MINUTES DO YOU ENGAGE IN EXERCISE AT THIS LEVEL?: 30 MIN

## 2024-11-06 SDOH — HEALTH STABILITY: PHYSICAL HEALTH: ON AVERAGE, HOW MANY DAYS PER WEEK DO YOU ENGAGE IN MODERATE TO STRENUOUS EXERCISE (LIKE A BRISK WALK)?: 1 DAY

## 2024-11-06 ASSESSMENT — SOCIAL DETERMINANTS OF HEALTH (SDOH): HOW OFTEN DO YOU GET TOGETHER WITH FRIENDS OR RELATIVES?: THREE TIMES A WEEK

## 2024-11-06 ASSESSMENT — PAIN SCALES - GENERAL: PAINLEVEL_OUTOF10: NO PAIN (0)

## 2024-11-06 NOTE — TELEPHONE ENCOUNTER
PA Initiation    Medication: ZEPBOUND 7.5 MG/0.5ML SC SOAJ  Insurance Company: [employer] - Phone [phone] Fax [phone]  Pharmacy Filling the Rx: [id number] - [place] [address] [facility] [address]  [place]  Filling Pharmacy Phone: [phone]  Filling Pharmacy Fax: [phone]  Start Date: 11/6/2024     Authorization Expiration Date: 11/25/2024   Key: [id number]  Waiting for clinical questionnaire to drop. Once received will complete and submit prior auth renewal to patient's plan.

## 2024-11-06 NOTE — PATIENT INSTRUCTIONS
Patient Education   Preventive Care Advice   This is general advice given by our system to help you stay healthy. However, your care team may have specific advice just for you. Please talk to your care team about your preventive care needs.  Nutrition  Eat 5 or more servings of fruits and vegetables each day.  Try wheat bread, brown rice and whole grain pasta (instead of white bread, rice, and pasta).  Get enough calcium and vitamin D. Check the label on foods and aim for 100% of the RDA (recommended daily allowance).  Lifestyle  Exercise at least 150 minutes each week  (30 minutes a day, 5 days a week).  Do muscle strengthening activities 2 days a week. These help control your weight and prevent disease.  No smoking.  Wear sunscreen to prevent skin cancer.  Have a dental exam and cleaning every 6 months.  Yearly exams  See your health care team every year to talk about:  Any changes in your health.  Any medicines your care team has prescribed.  Preventive care, family planning, and ways to prevent chronic diseases.  Shots (vaccines)   HPV shots (up to age 26), if you've never had them before.  Hepatitis B shots (up to age 59), if you've never had them before.  COVID-19 shot: Get this shot when it's due.  Flu shot: Get a flu shot every year.  Tetanus shot: Get a tetanus shot every 10 years.  Pneumococcal, hepatitis A, and RSV shots: Ask your care team if you need these based on your risk.  Shingles shot (for age 50 and up)  General health tests  Diabetes screening:  Starting at age 35, Get screened for diabetes at least every 3 years.  If you are younger than age 35, ask your care team if you should be screened for diabetes.  Cholesterol test: At age 39, start having a cholesterol test every 5 years, or more often if advised.  Bone density scan (DEXA): At age 50, ask your care team if you should have this scan for osteoporosis (brittle bones).  Hepatitis C: Get tested at least once in your life.  STIs (sexually  transmitted infections)  Before age 24: Ask your care team if you should be screened for STIs.  After age 24: Get screened for STIs if you're at risk. You are at risk for STIs (including HIV) if:  You are sexually active with more than one person.  You don't use condoms every time.  You or a partner was diagnosed with a sexually transmitted infection.  If you are at risk for HIV, ask about PrEP medicine to prevent HIV.  Get tested for HIV at least once in your life, whether you are at risk for HIV or not.  Cancer screening tests  Cervical cancer screening: If you have a cervix, begin getting regular cervical cancer screening tests starting at age 21.  Breast cancer scan (mammogram): If you've ever had breasts, begin having regular mammograms starting at age 40. This is a scan to check for breast cancer.  Colon cancer screening: It is important to start screening for colon cancer at age 45.  Have a colonoscopy test every 10 years (or more often if you're at risk) Or, ask your provider about stool tests like a FIT test every year or Cologuard test every 3 years.  To learn more about your testing options, visit:   .  For help making a decision, visit:   https://bit.ly/fw37647.  Prostate cancer screening test: If you have a prostate, ask your care team if a prostate cancer screening test (PSA) at age 55 is right for you.  Lung cancer screening: If you are a current or former smoker ages 50 to 80, ask your care team if ongoing lung cancer screenings are right for you.  For informational purposes only. Not to replace the advice of your health care provider. Copyright   2023 [employer]. All rights reserved. Clinically reviewed by the [facility] Transitions Program. [employer] [id number] - REV 01/24.

## 2024-11-06 NOTE — PROGRESS NOTES
"Preventive Care Visit  [facility]  MARIANO Soliz [facility], Family Medicine  Nov 6, 2024      Assessment & Plan     Routine general medical examination at a health care facility  -next preventative care visit in one year.     Chronic left-sided low back pain, unspecified whether sciatica present    - cyclobenzaprine (FLEXERIL) 10 MG tablet; Take 0.5-1 tablets (5-10 mg) by mouth 2 times daily as needed for muscle spasms.    Class 2 severe obesity due to excess calories with serious comorbidity and body mass index (BMI) of 35.0 to 35.9 in adult (H)  -now taking zepbound  - Hemoglobin A1c; Future    Hyperlipidemia LDL goal <130  -continue atorvastatin 40 mg  - Lipid panel reflex to direct LDL Non-fasting; Future  - Basic metabolic panel; Future    TIA (transient ischemic attack)    - aspirin (ASPIRIN LOW DOSE) 81 MG EC tablet; Take 1 tablet (81 mg) by mouth daily.    Vaginal atrophy    - estradiol (ESTRACE) 0.1 MG/GM vaginal cream; PLACE 2 GRAMS VAGINALLY 2  TIMES A WEEK    Lichen sclerosus    - clobetasol (TEMOVATE) 0.05 % external ointment; Apply topically 2 times daily.    History of allergic reaction    - EPINEPHrine (ANY BX GENERIC EQUIV) 0.3 MG/0.3ML injection 2-pack; Inject 0.3 mLs (0.3 mg) into the muscle once as needed for anaphylaxis.    Vitamin D deficiency  -recommend recheck  - Vitamin D Deficiency; Future    Patient has been advised of split billing requirements and indicates understanding: Yes        BMI  Estimated body mass index is 33.06 kg/m  as calculated from the following:    Height as of this encounter: 1.626 m (5' 4\").    Weight as of this encounter: 87.4 kg (192 lb 9.6 oz).   Weight management plan: Patient referred to endocrine and/or weight management specialty    Counseling  Appropriate preventive services were addressed with this patient via screening, questionnaire, or discussion as appropriate for fall prevention, nutrition, physical activity, Tobacco-use " cessation, social engagement, weight loss and cognition.  Checklist reviewing preventive services available has been given to the patient.  Reviewed patient's diet, addressing concerns and/or questions.   She is at risk for lack of exercise and has been provided with information to increase physical activity for the benefit of her well-being.   She is at risk for psychosocial distress and has been provided with information to reduce risk.           Remberto Vela is a 59 year old, presenting for the following:  Physical        11/6/2024     3:01 PM   Additional Questions   Roomed by Bia DYKES   Accompanied by self      Patient here for yearly preventative care visit. In addition, they have the following concerns:    1. Refill on medications  Retiring in 5 months        Health Care Directive  Patient does not have a Health Care Directive: Discussed advance care planning with patient; however, patient declined at this time.      11/6/2024   General Health   How would you rate your overall physical health? (!) FAIR   Feel stress (tense, anxious, or unable to sleep) Only a little      (!) STRESS CONCERN      11/6/2024   Nutrition   Three or more servings of calcium each day? Yes   Diet: Regular (no restrictions)   How many servings of fruit and vegetables per day? (!) 2-3   How many sweetened beverages each day? 0-1            11/6/2024   Exercise   Days per week of moderate/strenous exercise 1 day   Average minutes spent exercising at this level 30 min      (!) EXERCISE CONCERN      11/6/2024   Social Factors   Frequency of gathering with friends or relatives Three times a week   Worry food won't last until get money to buy more No   Food not last or not have enough money for food? No   Do you have housing? (Housing is defined as stable permanent housing and does not include staying ouside in a car, in a tent, in an abandoned building, in an overnight shelter, or couch-surfing.) Yes   Are you worried about losing  your housing? No   Lack of transportation? No   Unable to get utilities (heat,electricity)? No            2024   Fall Risk   Fallen 2 or more times in the past year? No    Trouble with walking or balance? No        Patient-reported          2024   Dental   Dentist two times every year? Yes            2024   TB Screening   Were you born outside of the [place]? No            Today's PHQ-2 Score:       2024     9:14 AM   PHQ-2 (  [employer])   Q1: Little interest or pleasure in doing things 0   Q2: Feeling down, depressed or hopeless 0   PHQ-2 Score 0         2024   Substance Use   Alcohol more than 3/day or more than 7/wk Not Applicable   Do you use any other substances recreationally? No        Social History     Tobacco Use    Smoking status: Former     Current packs/day: 0.00     Average packs/day: 0.5 packs/day for 30.0 years (15.0 ttl pk-yrs)     Types: Cigarettes     Start date:      Quit date: 2018     Years since quittin.8    Smokeless tobacco: Never   Vaping Use    Vaping status: Never Used   Substance Use Topics    Alcohol use: No     Comment: occ    Drug use: No           2023   LAST FHS-7 RESULTS   1st degree relative breast or ovarian cancer Yes   Any relative bilateral breast cancer No   Any male have breast cancer No   Any ONE woman have BOTH breast AND ovarian cancer No   Any woman with breast cancer before 50yrs No   2 or more relatives with breast AND/OR ovarian cancer No   2 or more relatives with breast AND/OR bowel cancer No          Mammogram Screening - Mammogram every 1-2 years updated in Health Maintenance based on mutual decision making        2024   STI Screening   New sexual partner(s) since last STI/HIV test? No        History of abnormal Pap smear: No - age 30- 64 PAP with HPV every 5 years recommended        Latest Ref Rng & Units 10/5/2020     8:21 AM 10/5/2020     7:52 AM 2017     3:18 PM   PAP / HPV   PAP (Historical)   NIL     HPV 16 DNA  "NEG^Negative Negative   Negative    HPV 18 DNA NEG^Negative Negative   Negative    Other HR HPV NEG^Negative Negative   Negative      ASCVD Risk   The 10-year ASCVD risk score (Zach PEARL, et al., 2019) is: 2.6%    Values used to calculate the score:      Age: 59 years      Sex: Female      Is Non- : No      Diabetic: No      Tobacco smoker: No      Systolic Blood Pressure: 115 mmHg      Is BP treated: No      HDL Cholesterol: 41 mg/dL      Total Cholesterol: 192 mg/dL    Fracture Risk Assessment Tool  Link to Frax Calculator  Use the information below to complete the Frax calculator  : 1965  Sex: female  Weight (kg): 87.4 kg (actual weight)  Height (cm): 162.6 cm  Previous Fragility Fracture:  No  History of parent with fractured hip:  No  Current Smoking:  No  Patient has been on glucocorticoids for more than 3 months (5mg/day or more): No  Rheumatoid Arthritis on Problem List:  No  Secondary Osteoporosis on Problem List:  No  Consumes 3 or more units of alcohol per day: No  Femoral Neck BMD (g/cm2)        2024   FRAX Calculated Score- 10yr Fracture Probability (%)   Major Osteoporotic- without BMD 5.5 %   Hip Fracture- without BMD 0.3 %          Reviewed and updated as needed this visit by Provider   Tobacco   Meds  Problems  Med Hx  Surg Hx  Fam Hx  Soc Hx Sexual   Activity                   Objective    Exam  /76   Pulse 85   Temp 97.1  F (36.2  C) (Tympanic)   Resp 20   Ht 1.626 m (5' 4\")   Wt 87.4 kg (192 lb 9.6 oz)   LMP 2018 (Exact Date)   SpO2 99%   BMI 33.06 kg/m     Estimated body mass index is 33.06 kg/m  as calculated from the following:    Height as of this encounter: 1.626 m (5' 4\").    Weight as of this encounter: 87.4 kg (192 lb 9.6 oz).    Physical Exam  Constitutional:       Appearance: Normal appearance. She is not ill-appearing.   HENT:      Right Ear: Tympanic membrane, ear canal and external ear normal.      Left Ear: " Tympanic membrane, ear canal and external ear normal.      Nose: Nose normal. No congestion.      Mouth/Throat:      Mouth: Mucous membranes are moist.      Pharynx: Oropharynx is clear.   Eyes:      Pupils: Pupils are equal, round, and reactive to light.   Cardiovascular:      Rate and Rhythm: Normal rate and regular rhythm.      Pulses: Normal pulses.      Heart sounds: Normal heart sounds. No murmur heard.     No friction rub. No gallop.   Pulmonary:      Effort: Pulmonary effort is normal.      Breath sounds: Normal breath sounds.   Abdominal:      General: Abdomen is flat. Bowel sounds are normal.      Palpations: Abdomen is soft.   Musculoskeletal:         General: Normal range of motion.      Cervical back: Normal range of motion.   Lymphadenopathy:      Cervical: No cervical adenopathy.   Skin:     General: Skin is warm and dry.   Neurological:      General: No focal deficit present.      Mental Status: She is alert and oriented to person, place, and time.   Psychiatric:         Mood and Affect: Mood normal.         Behavior: Behavior normal.         Thought Content: Thought content normal.         Judgment: Judgment normal.               Signed Electronically by: MARIANO Soliz CNP

## 2024-11-07 NOTE — TELEPHONE ENCOUNTER
"Prior Authorization Not Needed per Insurance    Medication: ZEPBOUND 7.5 MG/0.5ML SC SOAJ  Insurance Company: [employer] - Phone [phone] Fax [phone]  Expected CoPay: $    Pharmacy Filling the Rx: [id number] - [place], [place] - [address] [facility] [address]    \"Your PA has been resolved, no additional PA is required.\"      "

## 2024-12-23 ENCOUNTER — VIRTUAL VISIT (OUTPATIENT)
Dept: ENDOCRINOLOGY | Facility: CLINIC | Age: 59
End: 2024-12-23
Attending: INTERNAL MEDICINE
Payer: COMMERCIAL

## 2024-12-23 ENCOUNTER — TELEPHONE (OUTPATIENT)
Dept: ENDOCRINOLOGY | Facility: CLINIC | Age: 59
End: 2024-12-23

## 2024-12-23 VITALS — BODY MASS INDEX: 32.44 KG/M2 | WEIGHT: 190 LBS | HEIGHT: 64 IN

## 2024-12-23 RX ORDER — TIRZEPATIDE 10 MG/.5ML
10 INJECTION, SOLUTION SUBCUTANEOUS
Qty: 6 ML | Refills: 11 | Status: SHIPPED | OUTPATIENT
Start: 2024-12-23

## 2024-12-23 ASSESSMENT — PAIN SCALES - GENERAL: PAINLEVEL_OUTOF10: NO PAIN (0)

## 2024-12-23 NOTE — LETTER
"2024       RE: Christina Guzman  [address] [facility] [address]     Dear Colleague,    Thank you for referring your patient, Christina Guzman, to the [facility] [place] [facility]. Please see a copy of my visit note below.    Virtual Visit Details    Joined the call at 2024, 9:40:27 am.  Left the call at 2024, 9:45:35    Originating Location (pt. Location): Home    Distant Location (provider location):  Off-site  Platform used for Video Visit: [facility] Medical Weight Management Note     Christina Guzman  MRN:  [id number]  :  1965  GABRIELA:  2024    Dear MARIANO Soliz CNP,    I had the pleasure of seeing your patient Christina Guzman.  She is a 59 year old female who I am continuing to see for treatment of obesity related to:      3/24/2024    10:06 AM   --   I have the following health issues associated with obesity Pre-Diabetes    High Cholesterol    GERD (Reflux)   I have the following symptoms associated with obesity Knee Pain    Back Pain    Fatigue    Hip Pain     CURRENT WEIGHT:   190 lbs 0 oz    Wt Readings from Last 4 Encounters:   24 86.2 kg (190 lb)   24 87.4 kg (192 lb 9.6 oz)   10/02/24 87.1 kg (192 lb)   24 91.1 kg (200 lb 12.8 oz)     Height:  5' 4.016\"  Body Mass Index:  Body mass index is 32.6 kg/m .  Vitals:  B/P: Data Unavailable, P: Data Unavailable    Initial consult weight was 214 on 3/25/24.  Weight change since last seen on 2024 is down 10 pounds.   Total loss is 24 pounds.    INTERVAL HISTORY:  Feels effects of Zepbound on reducing sweet cravings and also helps with \"GI issues\". Medication is covered by her insurance ([employer] contract with ).        3/24/2024    10:06 AM   Diet Recall Review with Patient   If you do eat supper, what types of food do you typically eat? Something very quick or already prepared   If you " do snack, what types of food do you typically eat? Something sweet, candy   How many glasses of juice do you drink in a typical day? 0   How many of glasses of milk do you drink in a typical day? 1   If you do drink milk, what type? Whole   How many 8oz glasses of sugar containing drinks such as Jero-Aid/sweet tea do you drink in a day? 0   How many cans/bottles of sugar pop/soda/tea/sports drinks do you drink in a day? 0   How many cans/bottles of diet pop/soda/tea or sports drink do you drink in a day? 0   How often do you have a drink of alcohol? Never     MEDICATIONS:   Current Outpatient Medications   Medication Sig Dispense Refill     aspirin (ASPIRIN LOW DOSE) 81 MG EC tablet Take 1 tablet (81 mg) by mouth daily. 90 tablet 3     atorvastatin (LIPITOR) 40 MG tablet TAKE 1 TABLET DAILY 90 tablet 1     Blood Pressure Monitoring (BLOOD PRESSURE DIGITAL SOLN) KIT        clobetasol (TEMOVATE) 0.05 % external ointment Apply topically 2 times daily. 45 g 3     cyclobenzaprine (FLEXERIL) 10 MG tablet Take 0.5-1 tablets (5-10 mg) by mouth 2 times daily as needed for muscle spasms. 90 tablet 3     EPINEPHrine (ANY BX GENERIC EQUIV) 0.3 MG/0.3ML injection 2-pack Inject 0.3 mLs (0.3 mg) into the muscle once as needed for anaphylaxis. 2 each 2     estradiol (ESTRACE) 0.1 MG/GM vaginal cream PLACE 2 GRAMS VAGINALLY 2  TIMES A WEEK 42.5 g 2     LORazepam (ATIVAN) 1 MG tablet TAKE 2 TABLETS BY MOUTH EVERY NIGHT AT BEDTIME AND 2 TABLETS BY MOUTH ONE HOUR BEFORE DENTAL APPOINTMENT.       omeprazole (PRILOSEC) 40 MG DR capsule TAKE 1 CAPSULE DAILY 90 capsule 2     tirzepatide-Weight Management (ZEPBOUND) 7.5 MG/0.5ML prefilled pen Inject 0.5 mLs (7.5 mg) subcutaneously every 7 days 0.5 mL 11     triamcinolone (KENALOG) 0.1 % external cream Apply topically 2 times daily 15 g 0     No current facility-administered medications for this visit.         12/18/2024     9:17 AM   Weight Loss Medication History Reviewed With Patient    Are you having any side effects from the weight loss medication that we have prescribed you? No     ASSESSMENT:   Increase dose on Zepbound in support of food change.    FOLLOW-UP:    12 weeks.    Sincerely,  Rene Cruz MD      Again, thank you for allowing me to participate in the care of your patient.      Sincerely,    Rene Cruz MD

## 2024-12-23 NOTE — NURSING NOTE
Current patient location: [address] [place] [address]    Is the patient currently in the state of [place]? YES    Visit mode:VIDEO    If the visit is dropped, the patient can be reconnected by: VIDEO VISIT: Text to cell phone:   Telephone Information:   Mobile [phone]       Will anyone else be joining the visit? NO  (If patient encounters technical issues they should call [phone]    Are changes needed to the allergy or medication list? Pt stated no changes to allergies and Pt stated no med changes    Are refills needed on medications prescribed by this physician? NO    Reason for visit: RECHECK    Roxanna RANDOLPH

## 2024-12-23 NOTE — PROGRESS NOTES
Virtual Visit Details    Joined the call at 12/23/2024, 9:40:27 am.  Left the call at 12/23/2024, 9:45:35    Originating Location (pt. Location): Home    Distant Location (provider location):  Off-site  Platform used for Video Visit: Laura

## 2024-12-23 NOTE — PROGRESS NOTES
"    Return Medical Weight Management Note     Christina Guzman  MRN:  [id number]  :  1965  GABRIELA:  2024    Dear MARIANO Soliz CNP,    I had the pleasure of seeing your patient Christina Guzman.  She is a 59 year old female who I am continuing to see for treatment of obesity related to:      3/24/2024    10:06 AM   --   I have the following health issues associated with obesity Pre-Diabetes    High Cholesterol    GERD (Reflux)   I have the following symptoms associated with obesity Knee Pain    Back Pain    Fatigue    Hip Pain     CURRENT WEIGHT:   190 lbs 0 oz    Wt Readings from Last 4 Encounters:   24 86.2 kg (190 lb)   24 87.4 kg (192 lb 9.6 oz)   10/02/24 87.1 kg (192 lb)   24 91.1 kg (200 lb 12.8 oz)     Height:  5' 4.016\"  Body Mass Index:  Body mass index is 32.6 kg/m .  Vitals:  B/P: Data Unavailable, P: Data Unavailable    Initial consult weight was 214 on 3/25/24.  Weight change since last seen on 2024 is down 10 pounds.   Total loss is 24 pounds.    INTERVAL HISTORY:  Feels effects of Zepbound on reducing sweet cravings and also helps with \"GI issues\". Medication is covered by her insurance ( contract with ).        3/24/2024    10:06 AM   Diet Recall Review with Patient   If you do eat supper, what types of food do you typically eat? Something very quick or already prepared   If you do snack, what types of food do you typically eat? Something sweet, candy   How many glasses of juice do you drink in a typical day? 0   How many of glasses of milk do you drink in a typical day? 1   If you do drink milk, what type? Whole   How many 8oz glasses of sugar containing drinks such as Jero-Aid/sweet tea do you drink in a day? 0   How many cans/bottles of sugar pop/soda/tea/sports drinks do you drink in a day? 0   How many cans/bottles of diet pop/soda/tea or sports drink do you drink in a day? 0   How often do you have a drink of alcohol? Never     MEDICATIONS: "   Current Outpatient Medications   Medication Sig Dispense Refill    aspirin (ASPIRIN LOW DOSE) 81 MG EC tablet Take 1 tablet (81 mg) by mouth daily. 90 tablet 3    atorvastatin (LIPITOR) 40 MG tablet TAKE 1 TABLET DAILY 90 tablet 1    Blood Pressure Monitoring (BLOOD PRESSURE DIGITAL SOLN) KIT       clobetasol (TEMOVATE) 0.05 % external ointment Apply topically 2 times daily. 45 g 3    cyclobenzaprine (FLEXERIL) 10 MG tablet Take 0.5-1 tablets (5-10 mg) by mouth 2 times daily as needed for muscle spasms. 90 tablet 3    EPINEPHrine (ANY BX GENERIC EQUIV) 0.3 MG/0.3ML injection 2-pack Inject 0.3 mLs (0.3 mg) into the muscle once as needed for anaphylaxis. 2 each 2    estradiol (ESTRACE) 0.1 MG/GM vaginal cream PLACE 2 GRAMS VAGINALLY 2  TIMES A WEEK 42.5 g 2    LORazepam (ATIVAN) 1 MG tablet TAKE 2 TABLETS BY MOUTH EVERY NIGHT AT BEDTIME AND 2 TABLETS BY MOUTH ONE HOUR BEFORE DENTAL APPOINTMENT.      omeprazole (PRILOSEC) 40 MG DR capsule TAKE 1 CAPSULE DAILY 90 capsule 2    tirzepatide-Weight Management (ZEPBOUND) 7.5 MG/0.5ML prefilled pen Inject 0.5 mLs (7.5 mg) subcutaneously every 7 days 0.5 mL 11    triamcinolone (KENALOG) 0.1 % external cream Apply topically 2 times daily 15 g 0     No current facility-administered medications for this visit.         12/18/2024     9:17 AM   Weight Loss Medication History Reviewed With Patient   Are you having any side effects from the weight loss medication that we have prescribed you? No     ASSESSMENT:   Increase dose on Zepbound in support of food change.    FOLLOW-UP:    12 weeks.    Sincerely,  Rene Cruz MD

## 2024-12-23 NOTE — TELEPHONE ENCOUNTER
PA Initiation    Medication: ZEPBOUND 10 MG/0.5ML SC SOAJ  Insurance Company: [employer] - Phone [phone] Fax [phone]  [facility] the [id number] [place] [address] [facility] [address]  Filling Pharmacy Phone: [phone]  Filling Pharmacy Fax: [phone]  Start Date: 12/23/2024     Key: [id number]

## 2024-12-23 NOTE — TELEPHONE ENCOUNTER
Prior Authorization Approval    Medication: ZEPBOUND 10 MG/0.5ML SC SOAJ  Authorization Effective Date: 12/23/2024  Authorization Expiration Date: 12/23/2025  Approved Dose/Quantity: uud  Reference #: Key: [id number]   Insurance Company: [employer] - Phone [phone] Fax [phone]  Expected CoPay: $    CoPay Card Available:      Financial Assistance Needed:    Which Pharmacy is filling the prescription: [id number] - [facility] [address] [place] [address] AT [facility]  Pharmacy Notified: Yes

## 2024-12-30 ENCOUNTER — TELEPHONE (OUTPATIENT)
Dept: ENDOCRINOLOGY | Facility: CLINIC | Age: 59
End: 2024-12-30
Payer: COMMERCIAL

## 2024-12-30 NOTE — TELEPHONE ENCOUNTER
Patient confirmed scheduled appointment:  Date: 6/23/25  Time: 8:15 am  Visit type: TALHA ABREU  Provider: Dr Cruz  Location: virtual  Testing/imaging: n/a  Additional notes: n/a

## 2025-01-15 DIAGNOSIS — K21.9 GASTROESOPHAGEAL REFLUX DISEASE WITHOUT ESOPHAGITIS: ICD-10-CM

## 2025-01-15 RX ORDER — OMEPRAZOLE 40 MG/1
40 CAPSULE, DELAYED RELEASE ORAL DAILY
Qty: 90 CAPSULE | Refills: 2 | Status: SHIPPED | OUTPATIENT
Start: 2025-01-15

## 2025-01-15 NOTE — TELEPHONE ENCOUNTER
Medication Question or Refill        What medication are you calling about (include dose and sig)?: medication pended    Preferred Pharmacy:    [facility] [place] - [facility] AT [address] to [facility]  [place] [address]  Phone: [phone] Fax: [phone]      Controlled Substance Agreement on file:   CSA -- Patient Level:    CSA: None found at the patient level.       Who prescribed the medication?: Jessica Recio    Do you need a refill? Yes    When did you use the medication last? N/A    Patient offered an appointment? No    Do you have any questions or concerns?  No      Could we send this information to you in [facility] or would you prefer to receive a phone call?:   Patient would prefer a phone call   Okay to leave a detailed message?: Yes at Cell number on file:    Telephone Information:   Mobile [phone]

## 2025-01-27 ENCOUNTER — MYC REFILL (OUTPATIENT)
Dept: FAMILY MEDICINE | Facility: CLINIC | Age: 60
End: 2025-01-27
Payer: COMMERCIAL

## 2025-01-27 DIAGNOSIS — G45.9 TIA (TRANSIENT ISCHEMIC ATTACK): Chronic | ICD-10-CM

## 2025-01-27 DIAGNOSIS — L90.0 LICHEN SCLEROSUS: ICD-10-CM

## 2025-01-27 DIAGNOSIS — N95.2 VAGINAL ATROPHY: ICD-10-CM

## 2025-01-27 RX ORDER — CLOBETASOL PROPIONATE 0.5 MG/G
OINTMENT TOPICAL 2 TIMES DAILY
Qty: 45 G | Refills: 2 | Status: SHIPPED | OUTPATIENT
Start: 2025-01-27

## 2025-01-27 RX ORDER — ESTRADIOL 0.1 MG/G
CREAM VAGINAL
Qty: 42.5 G | Refills: 1 | Status: SHIPPED | OUTPATIENT
Start: 2025-01-27

## 2025-01-27 RX ORDER — ASPIRIN 81 MG/1
81 TABLET ORAL DAILY
Qty: 90 TABLET | Refills: 2 | Status: SHIPPED | OUTPATIENT
Start: 2025-01-27

## 2025-01-29 ENCOUNTER — TELEPHONE (OUTPATIENT)
Dept: ENDOCRINOLOGY | Facility: CLINIC | Age: 60
End: 2025-01-29
Payer: COMMERCIAL

## 2025-01-29 ENCOUNTER — TELEPHONE (OUTPATIENT)
Dept: FAMILY MEDICINE | Facility: CLINIC | Age: 60
End: 2025-01-29
Payer: COMMERCIAL

## 2025-01-29 NOTE — TELEPHONE ENCOUNTER
Prior Authorization Retail Medication Request    Medication/Dose: Omeprazole 40 mg DR capsules  Diagnosis and ICD code (if different than what is on RX):  Gastroesophageal reflux disease without esophagitis [K21.9]   New/renewal/insurance change PA/secondary ins. PA:  Previously Tried and Failed:    Rationale:      Insurance   Primary: [employer] Access  Insurance ID:  [id number]     Secondary (if applicable):  Insurance ID:      Pharmacy Information (if different than what is on RX)  Name:  [facility]  Phone:  [phone]  Fax: [phone]    Clinic Information  Preferred routing pool for dept communication: P [id number]    KEY:  [id number]

## 2025-01-30 NOTE — TELEPHONE ENCOUNTER
PA Initiation    Medication: ZEPBOUND 10 MG/0.5ML SC SOAJ  Insurance Company: [employer] Non-Specialty PA's - Phone [phone] Fax [phone]  Pharmacy Filling the Rx: [id number] [place] [address] [place] [facility] AT [address]  Filling Pharmacy Phone: [phone]  Filling Pharmacy Fax: [phone]  Start Date: 1/29/2025    Key: [id number]

## 2025-01-30 NOTE — TELEPHONE ENCOUNTER
Prior Authorization Approval    Medication: ZEPBOUND 10 MG/0.5ML SC SOAJ  Authorization Effective Date: 1/29/2025  Authorization Expiration Date: 1/29/2026  Approved Dose/Quantity: 2ml per 28 days  Reference #: Key: [id number]   Insurance Company: [employer] Non-Specialty PA's - Phone [phone] Fax [phone]  Expected CoPay: $    CoPay Card Available:      Financial Assistance Needed: no  Which Pharmacy is filling the prescription: [id number] - [facility] [address] AT [address] [facility]  Pharmacy Notified: no  Patient Notified: no renewal with new insurance

## 2025-02-03 NOTE — TELEPHONE ENCOUNTER
Prior Authorization Approval    Medication: OMEPRAZOLE 40 MG PO CPDR  Authorization Effective Date: 2/3/2025  Authorization Expiration Date: 2/3/2028  Approved Dose/Quantity:   Reference #:     Insurance Company: [employer] Non-Specialty PA's - Phone [phone] Fax [phone]  Expected CoPay: $    CoPay Card Available:      Financial Assistance Needed:   Which Pharmacy is filling the prescription: [facility] - [place] - [address] [facility] AT [address] TO [facility]  Pharmacy Notified: YES  Patient Notified: **Instructed pharmacy to notify patient when script is ready to /ship.**

## 2025-02-03 NOTE — TELEPHONE ENCOUNTER
PA Initiation    Medication: OMEPRAZOLE 40 MG PO CPDR  Insurance Company: [employer] Non-Specialty PA's - Phone [phone] Fax [phone]  Pharmacy [facility]illing the Rx[facility] [place] - [address] [facility] AT [address] TO [facility] [address]  Filling Pharmacy Phone: [phone]  Filling Pharmacy Fax: [phone]  Start Date: 2/3/2025

## 2025-03-17 ENCOUNTER — VIRTUAL VISIT (OUTPATIENT)
Dept: SLEEP MEDICINE | Facility: CLINIC | Age: 60
End: 2025-03-17
Payer: COMMERCIAL

## 2025-03-17 VITALS — HEIGHT: 64 IN | BODY MASS INDEX: 31.58 KG/M2 | WEIGHT: 185 LBS

## 2025-03-17 DIAGNOSIS — G47.33 OSA (OBSTRUCTIVE SLEEP APNEA): Primary | ICD-10-CM

## 2025-03-17 PROCEDURE — 1126F AMNT PAIN NOTED NONE PRSNT: CPT | Mod: 95 | Performed by: PHYSICIAN ASSISTANT

## 2025-03-17 PROCEDURE — 98006 SYNCH AUDIO-VIDEO EST MOD 30: CPT | Performed by: PHYSICIAN ASSISTANT

## 2025-03-17 RX ORDER — ZOLPIDEM TARTRATE 5 MG/1
TABLET ORAL
Qty: 30 TABLET | Refills: 0 | Status: SHIPPED | OUTPATIENT
Start: 2025-03-17

## 2025-03-17 ASSESSMENT — SLEEP AND FATIGUE QUESTIONNAIRES
HOW LIKELY ARE YOU TO NOD OFF OR FALL ASLEEP WHEN YOU ARE A PASSENGER IN A CAR FOR AN HOUR WITHOUT A BREAK: MODERATE CHANCE OF DOZING
HOW LIKELY ARE YOU TO NOD OFF OR FALL ASLEEP WHILE SITTING INACTIVE IN A PUBLIC PLACE: SLIGHT CHANCE OF DOZING
HOW LIKELY ARE YOU TO NOD OFF OR FALL ASLEEP WHILE SITTING AND READING: MODERATE CHANCE OF DOZING
HOW LIKELY ARE YOU TO NOD OFF OR FALL ASLEEP IN A CAR, WHILE STOPPED FOR A FEW MINUTES IN TRAFFIC: WOULD NEVER DOZE
HOW LIKELY ARE YOU TO NOD OFF OR FALL ASLEEP WHILE SITTING QUIETLY AFTER LUNCH WITHOUT ALCOHOL: HIGH CHANCE OF DOZING
HOW LIKELY ARE YOU TO NOD OFF OR FALL ASLEEP WHILE LYING DOWN TO REST IN THE AFTERNOON WHEN CIRCUMSTANCES PERMIT: HIGH CHANCE OF DOZING
HOW LIKELY ARE YOU TO NOD OFF OR FALL ASLEEP WHILE SITTING AND TALKING TO SOMEONE: WOULD NEVER DOZE
HOW LIKELY ARE YOU TO NOD OFF OR FALL ASLEEP WHILE WATCHING TV: HIGH CHANCE OF DOZING

## 2025-03-17 ASSESSMENT — PAIN SCALES - GENERAL: PAINLEVEL_OUTOF10: NO PAIN (0)

## 2025-03-17 NOTE — PROGRESS NOTES
Virtual Visit Details    Type of service:  Video Visit     Originating Location (pt. Location): Home    Distant Location (provider location):  On-site  Platform used for Video Visit: [facility]      Sleep Study Follow-Up Visit:    Date on this visit: 3/17/2025    Christina Guzman comes in today for follow-up of her sleep study done on 9/23/2025 at the [facility]. A diagnostic polysomnogram was performed to evaluate for  snoring, non-refreshing sleep, morning headaches, daytime fatigue/sleepiness (ESS 12) and difficulty maintaining sleep      Polysomnogram as interpreted by Dr. Eaton  Sleep Architecture:    The total recording time of the polysomnogram was 504.6 minutes. The total sleep time was 452.5 minutes. Sleep latency was decreased at 9.2 minutes with the use of a sleep aid (zolpidem). REM latency was 68.0 minutes. Arousal index was 19.0 arousals per hour. Sleep efficiency was normal at 89.7%. Wake after sleep onset was 42.5 minutes. The patient spent 3.3% of total sleep time in Stage N1, 26.0% in Stage N2, 46.4% in Stage N3, and 24.3% in REM. Time in REM supine was 79.5 minutes.     Respiration:    Events ? The polysomnogram revealed a presence of 22 obstructive, 0 central, and 0 mixed apneas resulting in an apnea index of 2.9 events per hour. There were 117 obstructive hypopneas and 0 central hypopneas resulting in an obstructive hypopnea index of 15.5 and central hypopnea index of 0 events per hour. The combined apnea/hypopnea index was 18.4 events per hour (central apnea/hypopnea index was 0 events per hour). The REM AHI was 45.8 events per hour. The supine AHI was 18.2 events per hour. The RERA index was 3.2 events per hour.  The RDI was 21.6 events per hour.  Snoring - was reported as moderate to loud.  Respiratory rate and pattern - was notable for normal respiratory rate and pattern.  Sustained Sleep Associated Hypoventilation - Transcutaneous carbon dioxide monitoring was not  used, however hypoventilation was suggested by oximetry   Sleep Associated Hypoxemia - (Greater than 5 minutes O2 sat at or below 88%) was present. Baseline oxygen saturation was 89.3%. Lowest oxygen saturation was 73.0%. Time spent less than or equal to 88% was 145.8 minutes. Time spent less than or equal to 89% was 266.1 minutes.     Movement Activity:    Periodic Limb Activity - There were 54 PLMs during the entire study. The PLM index was 7.2 movements per hour. The PLM Arousal Index was 1.6 per hour.  REM EMG Activity - Excessive transient/sustained muscle activity was not present.  Nocturnal Behavior - Abnormal sleep related behaviors were not noted    Bruxism - None apparent.    These findings were reviewed with patient.     Past medical/surgical history, family history, social history, medications and allergies were reviewed.      Problem List:  Patient Active Problem List    Diagnosis Date Noted    FABBY (obstructive sleep apnea) - moderate (AHI 18) 10/02/2024     Priority: Medium     9/23/2024 [facility] Study (205.0 lbs) - AHI 18.4, RDI 21.6, Supine AHI 18.2, REM AHI 45.8, Low O2 73.0%, Time Spent <=88% 145.8 minutes / Time Spent <=89% 266.1 minutes.      Irritable bowel syndrome with diarrhea 05/12/2024     Priority: Medium    Class 2 severe obesity due to excess calories with serious comorbidity in adult (H) 11/08/2023     Priority: Medium    Vaginal dryness 09/22/2022     Priority: Medium    Lichen sclerosus 09/22/2022     Priority: Medium    Diverticulosis of large intestine without hemorrhage 09/22/2022     Priority: Medium    H/O carotid endarterectomy 10/05/2018     Priority: Medium    History of TIA (transient ischemic attack) 10/05/2018     Priority: Medium    History of migraine 09/26/2018     Priority: Medium    Diaphragmatic hernia 07/06/2018     Priority: Medium    Low back pain, unspecified back pain laterality, unspecified chronicity, with sciatica presence unspecified  12/19/2017     Priority: Medium    GERD (gastroesophageal reflux disease) 05/07/2013     Priority: Medium    Vitamin D deficiency 03/17/2013     Priority: Medium    Depression 12/11/2012     Priority: Medium    Chronic idiopathic urticaria 10/11/2010     Priority: Medium    House dust mite allergy      Priority: Medium     DM only      Chronic insomnia 10/02/2024     Priority: Low    Restless legs syndrome (RLS) 10/02/2024     Priority: Low        Impression/Plan:    1. Moderate FABBY with significant sleep-associated hypoxemia-  - Overnight polysomnogram was reviewed in detail today   - Discussed this level of FABBY is associated with increase in long-term cardiovascular risk factors  - Treatment options reviewed.   - Reluctant but did agree to auto-CPAP 6-16 cm/H20  - Ambien as needed while she acclimates to CPAP.   - WatchPAT on CPAP to follow up on hypoxemia once she is established on CPAP    She will follow up with me in about 3 month(s).     30 minutes spent on day of encounter doing chart review,  history and exam, counseling, coordinating plan of care, documentation and further activities as noted above.       Terry Mayfield PA-C  Sleep Medicine

## 2025-03-17 NOTE — NURSING NOTE
Current patient location: [address] [place] [id number]    Is the patient currently in the state of [place]? YES    Visit mode: VIDEO    If the visit is dropped, the patient can be reconnected by:VIDEO VISIT: Text to cell phone:   Telephone Information:   Mobile [phone]       Will anyone else be joining the visit? NO  (If patient encounters technical issues they should call [phone]    Are changes needed to the allergy or medication list? No    Are refills needed on medications prescribed by this physician? NO    Rooming Documentation:  Questionnaire(s) completed    Reason for visit: RECHECK    Adrienne SULLIVANF

## 2025-03-17 NOTE — PATIENT INSTRUCTIONS
Your Body mass index is 31.76 kg/m .  Weight management is a personal decision.  If you are interested in exploring weight loss strategies, the following discussion covers the approaches that may be successful. Body mass index (BMI) is one way to tell whether you are at a healthy weight, overweight, or obese. It measures your weight in relation to your height.  A BMI of 18.5 to 24.9 is in the healthy range. A person with a BMI of 25 to 29.9 is considered overweight, and someone with a BMI of 30 or greater is considered obese. More than two-thirds of American adults are considered overweight or obese.  Being overweight or obese increases the risk for further weight gain. Excess weight may lead to heart disease and diabetes.  Creating and following plans for healthy eating and physical activity may help you improve your health.  Weight control is part of healthy lifestyle and includes exercise, emotional health, and healthy eating habits. Careful eating habits lifelong are the mainstay of weight control. Though there are significant health benefits from weight loss, long-term weight loss with diet alone may be very difficult to achieve- studies show long-term success with dietary management in less than 10% of people. Attaining a healthy weight may be especially difficult to achieve in those with severe obesity. In some cases, medications, devices and surgical management might be considered.  What can you do?  If you are overweight or obese and are interested in methods for weight loss, you should discuss this with your provider.   Consider reducing daily calorie intake by 500 calories.   Keep a food journal.   Avoiding skipping meals, consider cutting portions instead.    Diet combined with exercise helps maintain muscle while optimizing fat loss. Strength training is particularly important for building and maintaining muscle mass. Exercise helps reduce stress, increase energy, and improves fitness. Increasing  exercise without diet control, however, may not burn enough calories to loose weight.     Start walking three days a week 10-20 minutes at a time  Work towards walking thirty minutes five days a week   Eventually, increase the speed of your walking for 1-2 minutes at time    In addition, we recommend that you review healthy lifestyles and methods for weight loss available through the National Institutes of Health patient information sites:  http://win.niddk.nih.gov/publications/index.htm    And look into health and wellness programs that may be available through your health insurance provider, employer, local community center, or trudy club.

## 2025-03-23 ENCOUNTER — E-VISIT (OUTPATIENT)
Dept: URGENT CARE | Facility: CLINIC | Age: 60
End: 2025-03-23
Payer: COMMERCIAL

## 2025-03-23 DIAGNOSIS — U07.1 INFECTION DUE TO COVID-19 VIRUS VARIANT OF CONCERN: Primary | ICD-10-CM

## 2025-03-23 NOTE — PATIENT INSTRUCTIONS
Dear Mirian,    Thank you for submitting an evisit. I am sorry you are not feeling well. Based on your responses and your symptoms, you are eligible for treatment of your COVID-19 symptoms with Paxlovid. This is a medication that you will take twice daily for 5 days. To learn more information about Paxlovid, please visit: [email or web address].[email or web address]/.       If you take Paxlovid with other medicines, it could make you sick. This means that you need to stop taking some of your normal medicines while you're taking Paxlovid. Please carefully follow these directions so that you can take Paxlovid safely. You are taking:    Zolpidem (Ambien) for sleep.  Stop taking zolpidem and start taking Paxlovid the next day.  Do not take zolpidem while you're taking Paxlovid.  Start taking zolpidem 3 days after you finish taking Paxlovid.       Atorvastatin (Lipitor) for high cholesterol.  Stop taking atorvastatin and start taking Paxlovid the next day.  Do not take atorvastatin while you're taking Paxlovid.  Start taking Atorvastatin 3 days after you finish taking Paxlovid.     If you are not improving, having difficulty breathing, difficulty drinking or staying hydrated, or experiencing new or worsening symptoms, please call [phone] to speak to a triage nurse about your symptoms.     Paxlovid is no longer free from the government. There are financial assistance programs available. You can learn more at https://paxlovid.Shareaholic.HealthStream/ or 1-656.644.1882, press 2 for patient options. Please look into this before you go to the pharmacy to  your medicine.    Your Paxlovid prescription was sent to the pharmacy you requested. If you have difficulty getting the medication at this pharmacy, the following [facility] carry Paxlovid. If you need to transfer your Paxlovid prescription from your selected pharmacy, please call your preferred [facility] below and they can assist you in transferring your  prescription.     [facility]: [phone] (M-F 8a-6p, Sat/Sun 8a-4p)  [place]: [phone] (M-F 9a-5p)  [place]: [phone] (M-F 8a-6p, Sat 9a-12:30p)  [place]: [phone] (M-F 8:30a-6p, Sat/Sun 8:30a-12:30p)  [place]: [phone] (M-F 7a-7p)  [place]: [phone] (M-Th 8a-5:30p, Fri 8a-5p)  [place]: [phone] (M-F 8a-5p)  [place]:  [phone] (M-F 830a-6p; Sat/Sun 9a-1p)  [facility]): [phone] (M-F 8a-7p, Sat/Sun 8a-5p)  [place]: [phone] (M-F 9a-7p, Sat/Sun 9a-1p)   [place]: [phone] (M-F 8a-8:30p, Sat 9a-5p, Sun 9a-4p)  [place]: [phone] (M-F 8a-5p, Sat 9a-12p)  [place]: [phone]  (M-F 9a-5p, Sat/Sun 8a-4p)  [place]: [phone] (M-F 8a-5p)  [phone] (M-F 8am-9p, Sat/Sun 9a-5p)    Please call [phone] if you have other questions.     MARIANO HUDSON CNP      Coronavirus (COVID-19): Care Instructions  What is COVID-19?  COVID-19 is a disease caused by a type of coronavirus. This illness was first found in 2019 and has since spread worldwide (pandemic). Symptoms can range from mild, such as fever and body aches, to severe, including trouble breathing. COVID-19 can be deadly.  Coronaviruses are a large group of viruses. Some types cause the common cold. Others cause more serious illnesses like Middle East respiratory syndrome (MERS) and severe acute respiratory syndrome (SARS).  Follow-up care is a key part of your treatment and safety. Be sure to make and go to all appointments, and call your doctor if you are having problems. It's also a good idea to know your test results and keep a list of the medicines you take.  How can you self-isolate when you have COVID-19?  If you have COVID-19, there are things you can do to help avoid spreading the virus to others.  Stay home, and avoid contact with other people.  Limit contact with people in your home. If possible, stay in a separate bedroom and use a  separate bathroom.  Wear a high-quality mask when you are around other people.  Improve airflow. If you have to spend time indoors with others, open windows and doors. Or you can use a fan to blow air away from people and out a window.  Avoid contact with pets and other animals.  Cover your mouth and nose with a tissue when you cough or sneeze. Then throw it in the trash right away.  Wash your hands often, especially after you cough or sneeze. Use soap and water, and scrub for at least 20 seconds. If soap and water aren't available, use an alcohol-based hand .  Don't share personal household items. These include bedding, towels, cups and glasses, and eating utensils.  Wash laundry in the warmest water allowed for the fabric type, and dry it completely. It's okay to wash other people's laundry with yours.  Clean and disinfect your home. Use household  and disinfectant wipes or sprays.  Go to the CDC website at cdc.gov if you have questions.  When can you end self-isolation for COVID-19?  If you know or think that you have the virus, you may need to self-isolate. When you can be around other people you live with and leave home depends on whether you have symptoms.  If you tested positive but had no symptoms, wear a mask for at least 5 days.  If you have symptoms, you need to wait until your symptoms are getting better and you haven't had a fever for 24 hours while not taking medicines to lower the fever. Once you leave isolation, wear a mask for at least 5 more days when you are around other people.  If you were very sick, were in the hospital for COVID, or have a weakened immune system, talk to your doctor about how long you should isolate and wear a mask. It might be longer than 5 days.  Call your doctor or seek care if you have questions about your symptoms or when to end isolation.  Check the CDC website at cdc.gov for the most current information.  Where can you learn more?  Go to  "https://www.Roamer.net/patiented  Enter C007 in the search box to learn more about \"Coronavirus (COVID-19): Care Instructions.\"  Current as of: February 28, 2025  Content Version: 14.4    0285-8366 [employer].   Care instructions adapted under license by your healthcare professional. If you have questions about a medical condition or this instruction, always ask your healthcare professional. [employer] disclaims any warranty or liability for your use of this information.    "

## 2025-03-27 ENCOUNTER — NURSE TRIAGE (OUTPATIENT)
Dept: FAMILY MEDICINE | Facility: CLINIC | Age: 60
End: 2025-03-27
Payer: COMMERCIAL

## 2025-03-27 NOTE — TELEPHONE ENCOUNTER
Patient reports she tested positive for COVID Saturday. She went the the E.R. Tuesday. She made a statement that she doesn't know what day it is and is a little confused. At the hospital she got fluid, medication for pain in her stomach and nausea due to vomiting. She was feeling better yesterday (fever broke) and today she has taken a turn for the worse.   Today the body pains are out of control. Acetaminophen and ibuprofen are not helping at all. The stomach pains are so bad. She does not know what is going on.  Nothing make her stomach pains better. The stomach pains are constant. The pains currently rated 8/10. It is hard to catch her breath she doesn't know why and she is speaking in fragmented sentences. .   When asked she reports that she feels that she may be a little confused.    Nursing advice: She was asked to call 911 immediately. She was asked if she needed help to call 911 and she does. 911 called at 3:21 pm with the patient on the line. I asked if she felt she could unlock the door and she stated that she can and did. I stayed on the line the entire time during 911 and after.    Patient asked that I call her  to let him know what is going on but she could not remember his number. She asked that I call her son Jose at [phone] and let him know what is going on. She wants me to let him know that she will be asking them to take her to [facility].   I tried to call Jose at 3:30 pm and there was no answer.   I got a hold of Jose at 3:50 pm and advised him the we called 911 and she is going to request to go to [facility]. Patient and son verbalized good understanding, agrees with plan and needs no further support.  Thank you. Autumn Adair R.N.    Reason for Disposition   Difficult to awaken or acting confused (e.g., disoriented, slurred speech)    Additional Information   Negative: SEVERE difficulty breathing (e.g., struggling for each breath, speaks in single words)    Protocols used: Coronavirus  (COVID-19) Diagnosed or [facility]-P-TF

## 2025-04-02 DIAGNOSIS — E78.5 HYPERLIPIDEMIA LDL GOAL <130: ICD-10-CM

## 2025-04-02 RX ORDER — ATORVASTATIN CALCIUM 40 MG/1
TABLET, FILM COATED ORAL
Qty: 90 TABLET | Refills: 0 | Status: SHIPPED | OUTPATIENT
Start: 2025-04-02

## 2025-04-10 ENCOUNTER — DOCUMENTATION ONLY (OUTPATIENT)
Dept: SLEEP MEDICINE | Facility: CLINIC | Age: 60
End: 2025-04-10
Payer: COMMERCIAL

## 2025-04-10 DIAGNOSIS — G47.33 OSA (OBSTRUCTIVE SLEEP APNEA): Chronic | ICD-10-CM

## 2025-04-10 DIAGNOSIS — G25.81 RESTLESS LEGS SYNDROME (RLS): ICD-10-CM

## 2025-04-10 DIAGNOSIS — F51.04 CHRONIC INSOMNIA: Primary | ICD-10-CM

## 2025-04-10 NOTE — PROGRESS NOTES
Patient was offered choice of vendor and chose [facility].  Patient Christina Guzman was set up at [place]  on April 10, 2025. Patient received a Resmed Airsense 10 Pressures were set at  6-16 cm H2O.   Patient s ramp is 5 cm H2O for Auto and FLEX/EPR is EPR, 2.  Patient received a Resmed Mask name: P10  Pillow mask size For Her, Small, heated tubing and heated humidifier.  Patient has the following compliance requirements: none      Shira Napier

## 2025-04-14 ENCOUNTER — DOCUMENTATION ONLY (OUTPATIENT)
Dept: SLEEP MEDICINE | Facility: CLINIC | Age: 60
End: 2025-04-14
Payer: COMMERCIAL

## 2025-04-14 DIAGNOSIS — G25.81 RESTLESS LEGS SYNDROME (RLS): ICD-10-CM

## 2025-04-14 DIAGNOSIS — G47.33 OSA (OBSTRUCTIVE SLEEP APNEA): Chronic | ICD-10-CM

## 2025-04-14 DIAGNOSIS — F51.04 CHRONIC INSOMNIA: Primary | ICD-10-CM

## 2025-04-14 NOTE — PROGRESS NOTES
3 day Sleep therapy management telephone visit    Diagnostic AHI:   PS.4         LEFT VOICE MESSAGE FOR PATIENT TO RETURN CALL      Order settings:  CPAP MIN CPAP MAX   6 cm H2O 16 cm H2O         Device settings:  CPAP MIN CPAP MAX EPR RESMED SOFT RESPONSE SETTING   6 cm  H20 16 cm  H20 2 OFF         Patient has the following upcoming sleep appts:      Replacement device: No  STM ordered by provider: Yes     Total time spent on accessing and  interpreting remote patient PAP therapy data  10 minutes    Total time spent counseling, coaching  and reviewing PAP therapy data with patient  0 minutes

## 2025-04-21 ENCOUNTER — OFFICE VISIT (OUTPATIENT)
Dept: FAMILY MEDICINE | Facility: CLINIC | Age: 60
End: 2025-04-21
Payer: COMMERCIAL

## 2025-04-21 VITALS
TEMPERATURE: 97.3 F | OXYGEN SATURATION: 98 % | WEIGHT: 188.4 LBS | RESPIRATION RATE: 20 BRPM | HEIGHT: 64 IN | DIASTOLIC BLOOD PRESSURE: 82 MMHG | SYSTOLIC BLOOD PRESSURE: 129 MMHG | HEART RATE: 85 BPM | BODY MASS INDEX: 32.17 KG/M2

## 2025-04-21 DIAGNOSIS — A41.9 SEPSIS DUE TO URINARY TRACT INFECTION (H): Primary | ICD-10-CM

## 2025-04-21 DIAGNOSIS — N39.0 SEPSIS DUE TO URINARY TRACT INFECTION (H): Primary | ICD-10-CM

## 2025-04-21 LAB
BASOPHILS # BLD AUTO: 0.1 10E3/UL (ref 0–0.2)
BASOPHILS NFR BLD AUTO: 1 %
EOSINOPHIL # BLD AUTO: 0.8 10E3/UL (ref 0–0.7)
EOSINOPHIL NFR BLD AUTO: 10 %
ERYTHROCYTE [DISTWIDTH] IN BLOOD BY AUTOMATED COUNT: 15.4 % (ref 10–15)
HCT VFR BLD AUTO: 37.4 % (ref 35–47)
HGB BLD-MCNC: 12.1 G/DL (ref 11.7–15.7)
IMM GRANULOCYTES # BLD: 0 10E3/UL
IMM GRANULOCYTES NFR BLD: 0 %
LYMPHOCYTES # BLD AUTO: 2.7 10E3/UL (ref 0.8–5.3)
LYMPHOCYTES NFR BLD AUTO: 34 %
MCH RBC QN AUTO: 27 PG (ref 26.5–33)
MCHC RBC AUTO-ENTMCNC: 32.4 G/DL (ref 31.5–36.5)
MCV RBC AUTO: 84 FL (ref 78–100)
MONOCYTES # BLD AUTO: 0.5 10E3/UL (ref 0–1.3)
MONOCYTES NFR BLD AUTO: 7 %
NEUTROPHILS # BLD AUTO: 4 10E3/UL (ref 1.6–8.3)
NEUTROPHILS NFR BLD AUTO: 49 %
PLATELET # BLD AUTO: 342 10E3/UL (ref 150–450)
RBC # BLD AUTO: 4.48 10E6/UL (ref 3.8–5.2)
WBC # BLD AUTO: 8.2 10E3/UL (ref 4–11)

## 2025-04-21 PROCEDURE — 80053 COMPREHEN METABOLIC PANEL: CPT | Performed by: NURSE PRACTITIONER

## 2025-04-21 PROCEDURE — 3074F SYST BP LT 130 MM HG: CPT | Performed by: NURSE PRACTITIONER

## 2025-04-21 PROCEDURE — 85025 COMPLETE CBC W/AUTO DIFF WBC: CPT | Performed by: NURSE PRACTITIONER

## 2025-04-21 PROCEDURE — 1111F DSCHRG MED/CURRENT MED MERGE: CPT | Performed by: NURSE PRACTITIONER

## 2025-04-21 PROCEDURE — 3079F DIAST BP 80-89 MM HG: CPT | Performed by: NURSE PRACTITIONER

## 2025-04-21 PROCEDURE — 1126F AMNT PAIN NOTED NONE PRSNT: CPT | Performed by: NURSE PRACTITIONER

## 2025-04-21 PROCEDURE — 36415 COLL VENOUS BLD VENIPUNCTURE: CPT | Performed by: NURSE PRACTITIONER

## 2025-04-21 PROCEDURE — G2211 COMPLEX E/M VISIT ADD ON: HCPCS | Performed by: NURSE PRACTITIONER

## 2025-04-21 PROCEDURE — 99213 OFFICE O/P EST LOW 20 MIN: CPT | Performed by: NURSE PRACTITIONER

## 2025-04-21 ASSESSMENT — PAIN SCALES - GENERAL: PAINLEVEL_OUTOF10: NO PAIN (0)

## 2025-04-21 NOTE — PROGRESS NOTES
Assessment & Plan     Sepsis due to urinary tract infection (H)  -symptoms have improved s/p antibiotic treatment. Will recheck kidney/hepatic function today. Discussed how to prevent UTIs, when to follow up if symptoms recur. Patient verbalized understanding of and is in agreement with plan of care. Denies any additional questions at this time.   - CBC with Platelets & Differential  - Comprehensive metabolic panel        MED REC REQUIRED  Post Medication Reconciliation Status:  Discharge medications reconciled, continue medications without change        Subjective   Mirian is a 59 year old, presenting for the following health issues:  Hospital F/U        4/21/2025    10:44 AM   Additional Questions   Roomed by Bia DYKES   Accompanied by self     Week before went to ED noticed some increased urinary urgency/frequency and started drinking cranberry juice. Started feeling sick so she took a covid test which was negative. Started feeling progressively sicker. Second covid test came back positive but control line was not present. Progressively got sicker, vomiting frequently, went to ED was treated with pain meds and fluids on Tuesday. Went home felt a little better, than 2 days later had severe pain, was unable to answer nurse triage questions due to severe pain, nurse called 911 and EMS was dispatched, patient was taken to [facility] and admitted with uti sepsis. Blood culture positive for E coli.     Today is feeling much better, feeling back to normal, energy levels are better.     Finished 10 day course of cephalexin.             Hospital Follow-up Visit:    Hospital/Nursing Home/IP Rehab Facility:  [facility]  Date of Admission: 03/27/2025  Date of Discharge: 03/31/2025  Reason(s) for Admission: Severe sepsis (HC) (Primary Dx);   Urinary tract infection with hematuria, site unspecified;   Abdominal discomfort;   COVID-19   Was the patient in the ICU or did the patient experience delirium during hospitalization?  " No  Do you have any other stressors you would like to discuss with your provider? No    Problems taking medications regularly:  None  Medication changes since discharge: None  Problems adhering to non-medication therapy:  None    Summary of hospitalization:  CareEverywhere information obtained and reviewed  Diagnostic Tests/Treatments reviewed.  Follow up needed: none  Other Healthcare Providers Involved in Patient s Care:         None  Update since discharge: improved.     Plan of care communicated with patient                   Objective    /82   Pulse 85   Temp 97.3  F (36.3  C) (Tympanic)   Resp 20   Ht 1.626 m (5' 4\")   Wt 85.5 kg (188 lb 6.4 oz)   LMP 05/25/2018 (Exact Date)   SpO2 98%   BMI 32.34 kg/m    Body mass index is 32.34 kg/m .  Physical Exam  Constitutional:       General: She is not in acute distress.     Appearance: Normal appearance.   HENT:      Right Ear: External ear normal.      Left Ear: External ear normal.   Eyes:      Pupils: Pupils are equal, round, and reactive to light.   Cardiovascular:      Rate and Rhythm: Normal rate.      Pulses: Normal pulses.      Heart sounds: Normal heart sounds. No murmur heard.     No friction rub. No gallop.   Pulmonary:      Effort: Pulmonary effort is normal. No respiratory distress.      Breath sounds: No wheezing, rhonchi or rales.   Abdominal:      General: Abdomen is flat. Bowel sounds are normal.      Palpations: Abdomen is soft.      Tenderness: There is no right CVA tenderness or left CVA tenderness.   Skin:     General: Skin is warm and dry.   Neurological:      General: No focal deficit present.      Mental Status: She is alert and oriented to person, place, and time.   Psychiatric:         Mood and Affect: Mood normal.         Behavior: Behavior normal.         Thought Content: Thought content normal.         Judgment: Judgment normal.                    Signed Electronically by: MARIANO Soliz CNP    "

## 2025-04-22 LAB
ALBUMIN SERPL BCG-MCNC: 4.2 G/DL (ref 3.5–5.2)
ALP SERPL-CCNC: 87 U/L (ref 40–150)
ALT SERPL W P-5'-P-CCNC: 23 U/L (ref 0–50)
ANION GAP SERPL CALCULATED.3IONS-SCNC: 12 MMOL/L (ref 7–15)
AST SERPL W P-5'-P-CCNC: 23 U/L (ref 0–45)
BILIRUB SERPL-MCNC: 0.8 MG/DL
BUN SERPL-MCNC: 6.3 MG/DL (ref 6–20)
CALCIUM SERPL-MCNC: 8.9 MG/DL (ref 8.8–10.4)
CHLORIDE SERPL-SCNC: 104 MMOL/L (ref 98–107)
CREAT SERPL-MCNC: 0.7 MG/DL (ref 0.51–0.95)
EGFRCR SERPLBLD CKD-EPI 2021: >90 ML/MIN/1.73M2
GLUCOSE SERPL-MCNC: 88 MG/DL (ref 70–99)
HCO3 SERPL-SCNC: 24 MMOL/L (ref 22–29)
POTASSIUM SERPL-SCNC: 4.4 MMOL/L (ref 3.4–5.3)
PROT SERPL-MCNC: 7.2 G/DL (ref 6.4–8.3)
SODIUM SERPL-SCNC: 140 MMOL/L (ref 135–145)

## 2025-06-04 ASSESSMENT — SLEEP AND FATIGUE QUESTIONNAIRES
HOW LIKELY ARE YOU TO NOD OFF OR FALL ASLEEP WHILE SITTING AND TALKING TO SOMEONE: WOULD NEVER DOZE
HOW LIKELY ARE YOU TO NOD OFF OR FALL ASLEEP WHILE WATCHING TV: HIGH CHANCE OF DOZING
HOW LIKELY ARE YOU TO NOD OFF OR FALL ASLEEP WHILE SITTING INACTIVE IN A PUBLIC PLACE: SLIGHT CHANCE OF DOZING
HOW LIKELY ARE YOU TO NOD OFF OR FALL ASLEEP WHEN YOU ARE A PASSENGER IN A CAR FOR AN HOUR WITHOUT A BREAK: HIGH CHANCE OF DOZING
HOW LIKELY ARE YOU TO NOD OFF OR FALL ASLEEP WHILE SITTING QUIETLY AFTER LUNCH WITHOUT ALCOHOL: HIGH CHANCE OF DOZING
HOW LIKELY ARE YOU TO NOD OFF OR FALL ASLEEP WHILE LYING DOWN TO REST IN THE AFTERNOON WHEN CIRCUMSTANCES PERMIT: HIGH CHANCE OF DOZING
HOW LIKELY ARE YOU TO NOD OFF OR FALL ASLEEP IN A CAR, WHILE STOPPED FOR A FEW MINUTES IN TRAFFIC: WOULD NEVER DOZE
HOW LIKELY ARE YOU TO NOD OFF OR FALL ASLEEP WHILE SITTING AND READING: MODERATE CHANCE OF DOZING

## 2025-06-09 ENCOUNTER — VIRTUAL VISIT (OUTPATIENT)
Dept: SLEEP MEDICINE | Facility: CLINIC | Age: 60
End: 2025-06-09
Payer: COMMERCIAL

## 2025-06-09 VITALS — WEIGHT: 180 LBS | BODY MASS INDEX: 30.73 KG/M2 | HEIGHT: 64 IN

## 2025-06-09 DIAGNOSIS — G47.36 HYPOXEMIA ASSOCIATED WITH SLEEP: Primary | ICD-10-CM

## 2025-06-09 PROCEDURE — 1126F AMNT PAIN NOTED NONE PRSNT: CPT | Performed by: PHYSICIAN ASSISTANT

## 2025-06-09 PROCEDURE — 98006 SYNCH AUDIO-VIDEO EST MOD 30: CPT | Performed by: PHYSICIAN ASSISTANT

## 2025-06-09 ASSESSMENT — PAIN SCALES - GENERAL: PAINLEVEL_OUTOF10: NO PAIN (0)

## 2025-06-09 NOTE — NURSING NOTE
,Current patient location: [id number] [place] [id number]    Is the patient currently in the state of [place]? YES    Visit mode: VIDEO    If the visit is dropped, the patient can be reconnected by:VIDEO VISIT: Text to cell phone:   Telephone Information:   Mobile [phone]       Will anyone else be joining the visit? NO  (If patient encounters technical issues they should call [phone]    Are changes needed to the allergy or medication list? No    Are refills needed on medications prescribed by this physician? NO    Rooming Documentation:  Questionnaire(s) completed    Reason for visit: RECHECK    Dorian SULLIVANF

## 2025-06-09 NOTE — PROGRESS NOTES
Virtual Visit Details    Type of service:  Video Visit     Originating Location (pt. Location): Home    Distant Location (provider location):  On-site  Platform used for Video Visit: [facility]    Sleep Apnea - Follow-up Visit:    Impression/Plan:  Moderate obstructive sleep apnea with sleep related hypoxemia-  Excellent CPAP compliance and AHI is well controlled on CPAP 6-16 cm/H20. Daytime symptoms are improved.   Continue current treatment.   Comprehensive DME order placed.  Home sleep apnea to follow up on hypoxemia. Results via MyChart.       Christina Guzman will follow up in about 1 year or sooner if any concerns.     30 minutes spent on day of encounter doing chart review,  history and exam, counseling, coordinating plan of care, documentation and further activities as noted above.       Terry Mayfield PA-C  Sleep Medicine     History of Present Illness:  Chief Complaint   Patient presents with    RECHECK    CPAP Follow Up       Christina Guzman presents for follow-up of their moderate sleep apnea, managed with CPAP.     She initially presented with snoring, non-refreshing sleep, morning headaches, daytime fatigue/sleepiness (ESS 12) and difficulty maintaining sleep.    9/23/2024 [place] Diagnostic Sleep Study (205.0 lbs) - AHI 18.4, RDI 21.6, Supine AHI 18.2, REM AHI 45.8, Low O2 73.0%, Time Spent =88% 145.8 minutes / Time Spent =89% 266.1 minutes.    April 10, 2025. Patient received a Resmed Airsense 10 Pressures were set at 6-16 cm H2O.    Do you use a CPAP Machine at home: (Patient-Rptd) Yes  Overall, on a scale of 0-10 how would you rate your CPAP (0 poor, 10 great): (Patient-Rptd) 9    What type of mask do you use: (Patient-Rptd) Nasal Mask  Is your mask comfortable: (Patient-Rptd) Yes  If not, why:      Is your mask leaking: (Patient-Rptd) No  If yes, where do you feel it:    How many night per week does the mask leak (0-7):      Do you notice snoring with mask on: (Patient-Rptd) No  Do you notice  gasping arousals with mask on: (Patient-Rptd) No  Are you having significant oral or nasal dryness: (Patient-Rptd) Yes  Is the pressure setting comfortable: (Patient-Rptd) Yes  If not, why:      What is your typical bedtime: (Patient-Rptd) 9:30  How long does it take you to go to sleep on PAP therapy: (Patient-Rptd) 60 - 90 minutes  What time do you typically get out of bed for the day: (Patient-Rptd) 4:09 - 4:30  How many hours on average per night are you using PAP therapy: (Patient-Rptd) 6-7  How many hours are you sleeping per night: (Patient-Rptd) 5-6  Do you feel well rested in the morning:        ResMed   Auto-PAP 6.0 - 16.0 cmH2O 30 day usage data:    83% of days with > 4 hours of use. 3/30 days with no use.   Average use 393 minutes per day.   95%ile Leak 10.97 L/min.   CPAP 95% pressure 9.8 cm.   AHI 0.38 events per hour.       EPWORTH SLEEPINESS SCALE         6/4/2025     6:21 PM    [place] Sleepiness Scale ( CHERYL Kinney  [id number]<br>ESS - [place]/English - Final version - 21 Nov 07 - [facility] [place].)   Sitting and reading Moderate chance of dozing   Watching TV High chance of dozing   Sitting, inactive in a public place (e.g. a theatre or a meeting) Slight chance of dozing   As a passenger in a car for an hour without a break High chance of dozing   Lying down to rest in the afternoon when circumstances permit High chance of dozing   Sitting and talking to someone Would never doze   Sitting quietly after a lunch without alcohol High chance of dozing   In a car, while stopped for a few minutes in traffic Would never doze   [place] Score (MC) 15   [place] Score (Sleep) 15        Patient-reported       INSOMNIA SEVERITY INDEX (DERECK)          6/4/2025     6:16 PM   Insomnia Severity Index (DERECK)   Difficulty falling asleep 3   Difficulty staying asleep 2   Problems waking up too early 3   How SATISFIED/DISSATISFIED are you with your CURRENT sleep pattern? 3   How NOTICEABLE to others do you think your  sleep problem is in terms of impairing the quality of your life? 1   How WORRIED/DISTRESSED are you about your current sleep problem? 3   To what extent do you consider your sleep problem to INTERFERE with your daily functioning (e.g. daytime fatigue, mood, ability to function at work/daily chores, concentration, memory, mood, etc.) CURRENTLY? 3   DERECK Total Score 18        Patient-reported       Guidelines for Scoring/Interpretation:  Total score categories:  0-7 = No clinically significant insomnia   8-14 = Subthreshold insomnia   15-21 = Clinical insomnia (moderate severity)  22-28 = Clinical insomnia (severe)  Used via courtesy of www.[employer].va.gov with permission from Rene Ireland PhD., [facility]        Past medical/surgical history, family history, social history, medications and allergies were reviewed.        Problem List:  Patient Active Problem List    Diagnosis Date Noted    FABBY (obstructive sleep apnea) - moderate (AHI 18) 10/02/2024     Priority: Medium     9/23/2024 [place] Diagnostic Sleep Study (205.0 lbs) - AHI 18.4, RDI 21.6, Supine AHI 18.2, REM AHI 45.8, Low O2 73.0%, Time Spent <=88% 145.8 minutes / Time Spent <=89% 266.1 minutes.      Irritable bowel syndrome with diarrhea 05/12/2024     Priority: Medium    Class 2 severe obesity due to excess calories with serious comorbidity in adult (H) 11/08/2023     Priority: Medium    Vaginal dryness 09/22/2022     Priority: Medium    Lichen sclerosus 09/22/2022     Priority: Medium    Diverticulosis of large intestine without hemorrhage 09/22/2022     Priority: Medium    H/O carotid endarterectomy 10/05/2018     Priority: Medium    History of TIA (transient ischemic attack) 10/05/2018     Priority: Medium    History of migraine 09/26/2018     Priority: Medium    Diaphragmatic hernia 07/06/2018     Priority: Medium    Low back pain, unspecified back pain laterality, unspecified chronicity, with sciatica presence unspecified 12/19/2017      "Priority: Medium    GERD (gastroesophageal reflux disease) 05/07/2013     Priority: Medium    Vitamin D deficiency 03/17/2013     Priority: Medium    Depression 12/11/2012     Priority: Medium    Chronic idiopathic urticaria 10/11/2010     Priority: Medium    House dust mite allergy      Priority: Medium     DM only      Chronic insomnia 10/02/2024     Priority: Low    Restless legs syndrome (RLS) 10/02/2024     Priority: Low        Ht 1.626 m (5' 4\")   Wt 81.6 kg (180 lb)   LMP 05/25/2018 (Exact Date)   BMI 30.90 kg/m     "

## 2025-06-10 ENCOUNTER — TELEPHONE (OUTPATIENT)
Dept: SLEEP MEDICINE | Facility: CLINIC | Age: 60
End: 2025-06-10

## 2025-06-10 NOTE — TELEPHONE ENCOUNTER
Reason for Call:  Appointment Request    Patient requesting this type of appt:  HST drop off     Requested provider: Melissa Padron     Reason patient unable to be scheduled: Needs to be scheduled by clinic    When does patient want to be seen/preferred time: October 16, 2025    Comments: Patient states that she can't drop off device until 10 AM due to having to drop her grandkids off at school     Could we send this information to you in [place] [employer] or would you prefer to receive a phone call?:   Patient would prefer a phone call   Okay to leave a detailed message?: Yes at Home number on file [phone] (home)    Call taken on 6/10/2025 at 12:14 PM by Latesha Jackson

## 2025-06-12 ENCOUNTER — OFFICE VISIT (OUTPATIENT)
Dept: FAMILY MEDICINE | Facility: CLINIC | Age: 60
End: 2025-06-12
Payer: COMMERCIAL

## 2025-06-12 VITALS
OXYGEN SATURATION: 98 % | HEART RATE: 74 BPM | HEIGHT: 64 IN | DIASTOLIC BLOOD PRESSURE: 82 MMHG | RESPIRATION RATE: 16 BRPM | WEIGHT: 181.6 LBS | SYSTOLIC BLOOD PRESSURE: 121 MMHG | BODY MASS INDEX: 31 KG/M2 | TEMPERATURE: 96.8 F

## 2025-06-12 DIAGNOSIS — E66.01 CLASS 2 SEVERE OBESITY DUE TO EXCESS CALORIES WITH SERIOUS COMORBIDITY IN ADULT, UNSPECIFIED BMI (H): Primary | ICD-10-CM

## 2025-06-12 DIAGNOSIS — R22.31 MASS OF RIGHT ELBOW: ICD-10-CM

## 2025-06-12 DIAGNOSIS — G89.29 CHRONIC PAIN OF LEFT KNEE: ICD-10-CM

## 2025-06-12 DIAGNOSIS — M25.562 CHRONIC PAIN OF LEFT KNEE: ICD-10-CM

## 2025-06-12 DIAGNOSIS — E66.812 CLASS 2 SEVERE OBESITY DUE TO EXCESS CALORIES WITH SERIOUS COMORBIDITY IN ADULT, UNSPECIFIED BMI (H): Primary | ICD-10-CM

## 2025-06-12 PROCEDURE — 90471 IMMUNIZATION ADMIN: CPT | Performed by: NURSE PRACTITIONER

## 2025-06-12 PROCEDURE — 90677 PCV20 VACCINE IM: CPT | Performed by: NURSE PRACTITIONER

## 2025-06-12 PROCEDURE — 3079F DIAST BP 80-89 MM HG: CPT | Performed by: NURSE PRACTITIONER

## 2025-06-12 PROCEDURE — 1126F AMNT PAIN NOTED NONE PRSNT: CPT | Performed by: NURSE PRACTITIONER

## 2025-06-12 PROCEDURE — G2211 COMPLEX E/M VISIT ADD ON: HCPCS | Performed by: NURSE PRACTITIONER

## 2025-06-12 PROCEDURE — 99214 OFFICE O/P EST MOD 30 MIN: CPT | Mod: 25 | Performed by: NURSE PRACTITIONER

## 2025-06-12 PROCEDURE — 3074F SYST BP LT 130 MM HG: CPT | Performed by: NURSE PRACTITIONER

## 2025-06-12 ASSESSMENT — PAIN SCALES - GENERAL: PAINLEVEL_OUTOF10: NO PAIN (0)

## 2025-06-12 NOTE — PROGRESS NOTES
"  Assessment & Plan     Mass of right elbow  -globular calcifications on x-ray, will repeat MRI, labs to rule out hyperphosphatemia, gout, other causes of calcification  - XR Elbow Right 2 Views; Future  - MR Elbow Right w/o Contrast; Future  - Comprehensive metabolic panel; Future  - Phosphorus; Future  - Vitamin D Deficiency; Future  - Parathyroid Hormone Intact; Future  - Uric acid; Future  - Orthopedic  Referral; Future    Chronic pain of left knee  -differentials considered include OA, patellofemoral syndrome, bursitis Recommend trial of physical therapy,   Apply ice for 15-20 minutes intermittently as needed and especially after any offending activity. Daily stretching.  As pain recedes, begin normal activities slowly as tolerated.    - XR Knee Left 3 Views  - Physical Therapy  Referral; Future    Class 2 severe obesity due to excess calories with serious comorbidity in adult, unspecified BMI (H)  -following with weight management clinic, currently on tirzepatide and tolerating well.           BMI  Estimated body mass index is 31.17 kg/m  as calculated from the following:    Height as of this encounter: 1.626 m (5' 4\").    Weight as of this encounter: 82.4 kg (181 lb 9.6 oz).             Remberto Vela is a 59 year old, presenting for the following health issues:  Musculoskeletal Problem        6/12/2025     7:39 AM   Additional Questions   Roomed by Bia DYKES   Accompanied by self     1. Left medial knee pain ongoing for months. Going up and down stairs and walking a lot makes the pain worse, along with crossing her legs. Ibuprofen improves the pain. No known injury to the knee, no history of surgery.     2. Right elbow hurting more. History of MRI with possible calcific tendonitis. Feels like swelling/nodules are spreading.     Musculoskeletal Problem    History of Present Illness       Reason for visit:  Knee and elbow pain  Symptom onset:  More than a month  Symptom intensity:  " "Moderate  Symptom progression:  Worsening  Had these symptoms before:  Yes  Has tried/received treatment for these symptoms:  No  What makes it worse:  Walking  What makes it better:  Advil   She is taking medications regularly.                      Objective    /82   Pulse 74   Temp 96.8  F (36  C) (Tympanic)   Resp 16   Ht 1.626 m (5' 4\")   Wt 82.4 kg (181 lb 9.6 oz)   LMP 05/25/2018 (Exact Date)   SpO2 98%   BMI 31.17 kg/m    Body mass index is 31.17 kg/m .  Physical Exam  Constitutional:       Appearance: Normal appearance.   HENT:      Right Ear: External ear normal.      Left Ear: External ear normal.   Eyes:      Pupils: Pupils are equal, round, and reactive to light.   Cardiovascular:      Rate and Rhythm: Normal rate.   Pulmonary:      Effort: Pulmonary effort is normal.   Musculoskeletal:      Right elbow: Swelling present. Tenderness present.      Left knee: No crepitus. Normal range of motion. Tenderness present over the medial joint line. Normal alignment, normal meniscus and normal patellar mobility. Normal pulse.   Skin:     General: Skin is warm and dry.   Neurological:      General: No focal deficit present.      Mental Status: She is alert and oriented to person, place, and time.   Psychiatric:         Mood and Affect: Mood normal.         Behavior: Behavior normal.         Thought Content: Thought content normal.         Judgment: Judgment normal.            XR Elbow Right 2 Views  Result Date: 6/12/2025  EXAM: XR ELBOW RIGHT 2 VIEWS LOCATION: [facility] DATE: 6/12/2025 INDICATION: painful elbow mass spreading, calcific periarthritis? COMPARISON: None.     IMPRESSION: No acute displaced fracture. No right elbow joint effusion. Joint spaces are preserved. Spurring of the lateral humeral epicondyle. Incidental note is made of a small supracondylar spur. Globular calcifications within tissues posterior to the  proximal ulna spanning 2.3 cm in length. Though " nonspecific, this finding is favored to represent dystrophic calcifications or tumoral calcinosis.    XR Knee Left 3 Views  Result Date: 6/12/2025  EXAM: XR KNEE LEFT 3 VIEWS LOCATION: [facility] [place] DATE: 6/12/2025 INDICATION: chronic worsening left medial knee pain COMPARISON: None.     IMPRESSION: No acute displaced fracture or subluxation. Joint spaces are grossly preserved. Possible small knee joint effusion.      Curbside Consult      Signed Electronically by: MARIANO Soliz CNP

## 2025-06-13 ENCOUNTER — RESULTS FOLLOW-UP (OUTPATIENT)
Dept: FAMILY MEDICINE | Facility: CLINIC | Age: 60
End: 2025-06-13

## 2025-06-23 ENCOUNTER — VIRTUAL VISIT (OUTPATIENT)
Dept: ENDOCRINOLOGY | Facility: CLINIC | Age: 60
End: 2025-06-23
Attending: INTERNAL MEDICINE
Payer: COMMERCIAL

## 2025-06-23 VITALS — WEIGHT: 180 LBS | HEIGHT: 64 IN | BODY MASS INDEX: 30.73 KG/M2

## 2025-06-23 PROCEDURE — 1126F AMNT PAIN NOTED NONE PRSNT: CPT | Mod: 95 | Performed by: INTERNAL MEDICINE

## 2025-06-23 PROCEDURE — 98005 SYNCH AUDIO-VIDEO EST LOW 20: CPT | Performed by: INTERNAL MEDICINE

## 2025-06-23 ASSESSMENT — PAIN SCALES - GENERAL: PAINLEVEL_OUTOF10: NO PAIN (0)

## 2025-06-23 NOTE — PROGRESS NOTES
"    Return Medical Weight Management Note     Christina Guzman  MRN:  [id number]  :  1965  GABRIELA:  26    Dear MARIANO Soliz CNP,    I had the pleasure of seeing your patient Christina Guzman.  She is a 59 year old female who I am continuing to see for treatment of obesity related to:      3/24/2024    10:06 AM   --   I have the following health issues associated with obesity Pre-Diabetes    High Cholesterol    GERD (Reflux)   I have the following symptoms associated with obesity Knee Pain    Back Pain    Fatigue    Hip Pain     CURRENT WEIGHT:   180 lbs 0 oz    Wt Readings from Last 4 Encounters:   25 81.6 kg (180 lb)   25 82.4 kg (181 lb 9.6 oz)   25 81.6 kg (180 lb)   25 85.5 kg (188 lb 6.4 oz)     Height:  5' 4\"  Body Mass Index:  Body mass index is 30.9 kg/m .  Vitals:  B/P: Data Unavailable, P: Data Unavailable    Initial consult weight was 214 on 3/25/24.  Weight change since last seen on 24 is down 10 pounds.   Total loss is 34 pounds.    INTERVAL HISTORY:  Feels effects of Zepbound on reducing sweet cravings and also helps with \"GI issues\". Medication will no longer be covered by her insurance.        3/24/2024    10:06 AM   Diet Recall Review with Patient   If you do eat supper, what types of food do you typically eat? Something very quick or already prepared   If you do snack, what types of food do you typically eat? Something sweet, candy   How many glasses of juice do you drink in a typical day? 0   How many of glasses of milk do you drink in a typical day? 1   If you do drink milk, what type? Whole   How many 8oz glasses of sugar containing drinks such as Jero-Aid/sweet tea do you drink in a day? 0   How many cans/bottles of sugar pop/soda/tea/sports drinks do you drink in a day? 0   How many cans/bottles of diet pop/soda/tea or sports drink do you drink in a day? 0   How often do you have a drink of alcohol? Never     MEDICATIONS:   Current " Outpatient Medications   Medication Sig Dispense Refill    aspirin (ASPIRIN LOW DOSE) 81 MG EC tablet Take 1 tablet (81 mg) by mouth daily. 90 tablet 2    atorvastatin (LIPITOR) 40 MG tablet TAKE 1 TABLET DAILY 90 tablet 0    Blood Pressure Monitoring (BLOOD PRESSURE DIGITAL SOLN) KIT       clobetasol (TEMOVATE) 0.05 % external ointment Apply topically 2 times daily. 45 g 2    cyclobenzaprine (FLEXERIL) 10 MG tablet Take 0.5-1 tablets (5-10 mg) by mouth 2 times daily as needed for muscle spasms. 90 tablet 3    EPINEPHrine (ANY BX GENERIC EQUIV) 0.3 MG/0.3ML injection 2-pack Inject 0.3 mLs (0.3 mg) into the muscle once as needed for anaphylaxis. 2 each 2    estradiol (ESTRACE) 0.1 MG/GM vaginal cream PLACE 2 GRAMS VAGINALLY 2  TIMES A WEEK 42.5 g 1    omeprazole (PRILOSEC) 40 MG DR capsule Take 1 capsule (40 mg) by mouth daily. 90 capsule 2    triamcinolone (KENALOG) 0.1 % external cream Apply topically 2 times daily 15 g 0    ZEPBOUND 10 MG/0.5ML prefilled pen Inject 0.5 mLs (10 mg) subcutaneously every 7 days. 6 mL 11    zolpidem (AMBIEN) 5 MG tablet Take tablet by mouth 15 minutes prior to sleep as needed 30 tablet 0     No current facility-administered medications for this visit.         6/18/2025     9:17 AM   Weight Loss Medication History Reviewed With Patient   Which weight loss medications are you currently taking on a regular basis? Bupropion   Are you having any side effects from the weight loss medication that we have prescribed you? No     ASSESSMENT:   Switch to Wegovy 1.7 in view of her insurance and in support of food change.    FOLLOW-UP:    12 weeks.    Sincerely,  Rene Cruz MD

## 2025-06-23 NOTE — LETTER
"2025       RE: Christina Guzman  [address] [facility] [address]     Dear Colleague,    Thank you for referring your patient, Christina Guzman, to the [facility] [place] at [facility]. Please see a copy of my visit note below.    Virtual Visit Details    Joined the call at 2025, 8:19:55 am.  Left the call at 2025, 8:24:14 am    Originating Location (pt. Location): Home    Distant Location (provider location):  Off-site  Platform used for Video Visit: [facility] Weight Management Note     Christina Guzman  MRN:  [id number]  :  1965  GABRIELA:  25    Dear MARIANO Soliz CNP,    I had the pleasure of seeing your patient Christina Guzman.  She is a 59 year old female who I am continuing to see for treatment of obesity related to:      3/24/2024    10:06 AM   --   I have the following health issues associated with obesity Pre-Diabetes    High Cholesterol    GERD (Reflux)   I have the following symptoms associated with obesity Knee Pain    Back Pain    Fatigue    Hip Pain     CURRENT WEIGHT:   180 lbs 0 oz    Wt Readings from Last 4 Encounters:   25 81.6 kg (180 lb)   25 82.4 kg (181 lb 9.6 oz)   25 81.6 kg (180 lb)   25 85.5 kg (188 lb 6.4 oz)     Height:  5' 4\"  Body Mass Index:  Body mass index is 30.9 kg/m .  Vitals:  B/P: Data Unavailable, P: Data Unavailable    Initial consult weight was 214 on 3/25/24.  Weight change since last seen on 24 is down 10 pounds.   Total loss is 34 pounds.    INTERVAL HISTORY:  Feels effects of Zepbound on reducing sweet cravings and also helps with \"GI issues\". Medication will no longer be covered by her insurance.        3/24/2024    10:06 AM   Diet Recall Review with Patient   If you do eat supper, what types of food do you typically eat? Something very quick or already prepared   If you do snack, what types " of food do you typically eat? Something sweet, candy   How many glasses of juice do you drink in a typical day? 0   How many of glasses of milk do you drink in a typical day? 1   If you do drink milk, what type? Whole   How many 8oz glasses of sugar containing drinks such as Jero-Aid/sweet tea do you drink in a day? 0   How many cans/bottles of sugar pop/soda/tea/sports drinks do you drink in a day? 0   How many cans/bottles of diet pop/soda/tea or sports drink do you drink in a day? 0   How often do you have a drink of alcohol? Never     MEDICATIONS:   Current Outpatient Medications   Medication Sig Dispense Refill     aspirin (ASPIRIN LOW DOSE) 81 MG EC tablet Take 1 tablet (81 mg) by mouth daily. 90 tablet 2     atorvastatin (LIPITOR) 40 MG tablet TAKE 1 TABLET DAILY 90 tablet 0     Blood Pressure Monitoring (BLOOD PRESSURE DIGITAL SOLN) KIT        clobetasol (TEMOVATE) 0.05 % external ointment Apply topically 2 times daily. 45 g 2     cyclobenzaprine (FLEXERIL) 10 MG tablet Take 0.5-1 tablets (5-10 mg) by mouth 2 times daily as needed for muscle spasms. 90 tablet 3     EPINEPHrine (ANY BX GENERIC EQUIV) 0.3 MG/0.3ML injection 2-pack Inject 0.3 mLs (0.3 mg) into the muscle once as needed for anaphylaxis. 2 each 2     estradiol (ESTRACE) 0.1 MG/GM vaginal cream PLACE 2 GRAMS VAGINALLY 2  TIMES A WEEK 42.5 g 1     omeprazole (PRILOSEC) 40 MG DR capsule Take 1 capsule (40 mg) by mouth daily. 90 capsule 2     triamcinolone (KENALOG) 0.1 % external cream Apply topically 2 times daily 15 g 0     ZEPBOUND 10 MG/0.5ML prefilled pen Inject 0.5 mLs (10 mg) subcutaneously every 7 days. 6 mL 11     zolpidem (AMBIEN) 5 MG tablet Take tablet by mouth 15 minutes prior to sleep as needed 30 tablet 0     No current facility-administered medications for this visit.         6/18/2025     9:17 AM   Weight Loss Medication History Reviewed With Patient   Which weight loss medications are you currently taking on a regular basis?  Bupropion   Are you having any side effects from the weight loss medication that we have prescribed you? No     ASSESSMENT:   Switch to Wegovy 1.7 in view of her insurance and in support of food change.    FOLLOW-UP:    12 weeks.    Sincerely,  Rene Cruz MD    Again, thank you for allowing me to participate in the care of your patient.      Sincerely,    Rene Cruz MD

## 2025-06-23 NOTE — NURSING NOTE
Current patient location: daughter's house    Is the patient currently in the state of [place]? YES    Visit mode: VIDEO    If the visit is dropped, the patient can be reconnected by:VIDEO VISIT: Text to cell phone:   Telephone Information:   Mobile [phone]       Will anyone else be joining the visit? NO  (If patient encounters technical issues they should call [phone]    Are changes needed to the allergy or medication list? Pt stated no changes to allergies and Pt stated no med changes    Are refills needed on medications prescribed by this physician? Discuss with provider    Rooming Documentation:  Questionnaire(s) completed      Reason for visit: RECHECK    Wt other than 24 hrs:    Pain more than one location:  no  Malika RANDLOPH

## 2025-06-23 NOTE — PROGRESS NOTES
Virtual Visit Details    Joined the call at 6/23/2025, 8:19:55 am.  Left the call at 6/23/2025, 8:24:14 am    Originating Location (pt. Location): Home    Distant Location (provider location):  Off-site  Platform used for Video Visit: Laura

## 2025-06-27 NOTE — TELEPHONE ENCOUNTER
DIAGNOSIS: Referral from Jessica Recio mass of right elbow    APPOINTMENT DATE: 7/3/25   NOTES STATUS DETAILS   OFFICE NOTE from referring provider Epic 6/12/25: Jessica Recio APRN CNP   [facility]    MEDICATION LIST Epic    LABS     CBC/DIFF Epic / Care Everywhere 4/21/25  3/28/25  3/27/25   MRI PACS *schedule* 7/1/25 MR Elbow Right   7/29/24 MR Elbow Right    XRAYS (IMAGES & REPORTS) PACS 6/12/25 XR Elbow Right    TUMOR     PATHOLOGY Report  *slides are not needed unless provider/clinic asks* N/A

## 2025-07-01 ENCOUNTER — HOSPITAL ENCOUNTER (OUTPATIENT)
Dept: MRI IMAGING | Facility: CLINIC | Age: 60
Discharge: HOME | End: 2025-07-01
Attending: NURSE PRACTITIONER
Payer: COMMERCIAL

## 2025-07-01 DIAGNOSIS — R22.31 MASS OF RIGHT ELBOW: ICD-10-CM

## 2025-07-01 DIAGNOSIS — E78.5 HYPERLIPIDEMIA LDL GOAL <130: ICD-10-CM

## 2025-07-01 PROCEDURE — 73221 MRI JOINT UPR EXTREM W/O DYE: CPT | Mod: RT

## 2025-07-01 RX ORDER — ATORVASTATIN CALCIUM 40 MG/1
40 TABLET, FILM COATED ORAL DAILY
Qty: 90 TABLET | Refills: 1 | Status: SHIPPED | OUTPATIENT
Start: 2025-07-01

## 2025-07-02 NOTE — PROGRESS NOTES
[facility], Orthopaedic Oncology Surgery Consultation  by Walker Segura M.D.    Christina Guzman MRN# [id number]    YOB: 1965     Requesting physician: Jessica Barlow            Assessment and Plan:   Assessment:  Soft tissue calcifications, subcutaneous tissue adjacent to right olecranon region.  Stable over 1 year.  Unlikely represent any type of neoplasm.  Differential diagnosis includes etiology related to blunt trauma or overuse trauma, inflammatory conditions, endocrinopathy or renal disorders and less likely neoplastic.      Plan:  Patient's symptoms are mainly associated with focal tenderness over the subcutaneous tissue adjacent to olecranon.  While surgical excision certainly is reasonable and possible, she will have a soft tissue defect in this area that may also aggravate her symptoms with lack of any subcutaneous tissue and cushion in the area.  Therefore I recommended nonsurgical treatment initially with use of a neoprene sleeve or tennis elbow type strap brace placing the cushion portion of the appliance directly over the olecranon region.  If there has been enlargement or progressive growth over the next 6 to 12 months, she will return and we will then proceed with surgical excision and tissue sampling.      MD Keerthi Lopez Family Professor  Oncology and Adult Reconstructive Surgery  Dept Orthopaedic Surgery, [facility]  [phone] office, [phone] pager  [email or web address]    This note was created using dictation software and may contain errors.  Please contact the creator for any clarifications that are needed.            History of Present Illness:   59 year old female  chief complaint    This patient presents with discomfort involving her right elbow mainly when she rested upon areas and has focal tenderness directly over the olecranon and adjacent muscle tissue in an area where there are calcifications within the soft  tissue demonstrated radiographically and on MR imaging.    She denies any trauma to the area and has no other areas of calcifications.  She denies any history of renal disease or endocrine disorder.      Current symptoms:  Problem: Right elbow mass  Onset and duration: chronic  Awakens from sleep due to sx's:  No  Precipitating Injury:  No    Other joints or sites painful:  Yes  Fever: No  Appetite change or weight loss: No  History of prior or existing cancer: No    Background history:      TREATMENTS:  12/2/2014, EXCISION BONE SPUR LOWER EXTREMITY interphalangeal joint left hallux, (MICKEY Soares), [facility]   date, treatment, (surgeon), hospital             Physical Exam:     EXAMINATION pertinent findings:   PSYCH: Pleasant, healthy-appearing, alert, oriented x3, cooperative. Normal mood and affect.  VITAL SIGNS: Last menstrual period 05/25/2018, not currently breastfeeding..  Reviewed nursing intake notes.   There is no height or weight on file to calculate BMI.  RESP: non labored breathing   ABD: benign, soft, non-tender, no acute peritoneal findings  SKIN: grossly normal   LYMPHATIC: grossly normal, no adenopathy, no extremity edema  NEURO: grossly normal , no motor deficits  VASCULAR: satisfactory perfusion of all extremities   MUSCULOSKELETAL:   Tenderness directly over the muscular tendinous junction of her forearm musculature and the olecranon region.  Additional soft tissue calcifications within the subcutaneous tissue just distal to the olecranon.  No drainage.  No erythema.  Multiple densities appreciated on palpation.  Range of motion the elbow is normal.  No evidence of tendinitis over the triceps tendon.             Data:   All laboratory data reviewed  All imaging studies reviewed by me    Radiographs and MRI imaging reviewed.    DATA for DOCUMENTATION:         Past Medical History:     Patient Active Problem List   Diagnosis    Chronic idiopathic urticaria    House dust mite allergy     Depression    Vitamin D deficiency    GERD (gastroesophageal reflux disease)    Low back pain, unspecified back pain laterality, unspecified chronicity, with sciatica presence unspecified    H/O carotid endarterectomy    History of TIA (transient ischemic attack)    Vaginal dryness    Lichen sclerosus    Diverticulosis of large intestine without hemorrhage    Class 2 severe obesity due to excess calories with serious comorbidity in adult (H)    Diaphragmatic hernia    Irritable bowel syndrome with diarrhea    History of migraine    Chronic insomnia    Restless legs syndrome (RLS)    FABBY (obstructive sleep apnea) - moderate (AHI 18)     Past Medical History:   Diagnosis Date    Carotid stenosis 2018    Fructose disorder     Fructose Malabsorbtion    Menopausal depression 2012    Menopausal syndrome 2012    Migraines     TIA (transient ischemic attack) 10/05/2018       Also see scanned health assessment forms.       Past Surgical History:     Past Surgical History:   Procedure Laterality Date    CAROTID ENDARTERECTOMY      ENT SURGERY  2017    Oral surgery    ORTHOPEDIC SURGERY  2014    bone spur shaved from left big toe    TOE SURGERY      Left foot    ZZC ORAL SURGERY PROCEDURE  2017    Cyst removed from abscess in tooth            Social History:     Social History     Socioeconomic History    Marital status:      Spouse name: Not on file    Number of children: Not on file    Years of education: Not on file    Highest education level: Not on file   Occupational History    Not on file   Tobacco Use    Smoking status: Former     Current packs/day: 0.00     Average packs/day: 0.5 packs/day for 30.0 years (15.0 ttl pk-yrs)     Types: Cigarettes     Start date:      Quit date: 2018     Years since quittin.5     Passive exposure: Past    Smokeless tobacco: Never   Vaping Use    Vaping status: Never Used   Substance and Sexual Activity    Alcohol use: No     Comment: occ     Drug use: No    Sexual activity: Yes     Partners: Male     Birth control/protection: Post-menopausal, Male Surgical     Comment:  Vasectomy   Other Topics Concern    Parent/sibling w/ CABG, MI or angioplasty before 65F 55M? No     Service No    Blood Transfusions No    Caffeine Concern No    Occupational Exposure No    Hobby Hazards No    Sleep Concern No    Stress Concern No    Weight Concern Yes    Special Diet No    Back Care No    Exercise Yes    Bike Helmet No    Seat Belt Yes    Self-Exams Yes   Social History Narrative    Not on file     Social Drivers of Health     Financial Resource Strain: Low Risk  (3/28/2025)    Received from [employer] [facility]    Financial Resource Strain     Difficulty of Paying Living Expenses: 3     Difficulty of Paying Living Expenses: Not on file   Food Insecurity: No Food Insecurity (3/28/2025)    Received from [employer]    Food Insecurity     Do you worry your food will run out before you are able to buy more?: 1   Transportation Needs: No Transportation Needs (3/28/2025)    Received from [facility]    Transportation Needs     Does lack of transportation keep you from medical appointments?: 1     Does lack of transportation keep you from work, meetings or getting things that you need?: 1   Physical Activity: Insufficiently Active (11/6/2024)    Exercise Vital Sign     Days of Exercise per Week: 1 day     Minutes of Exercise per Session: 30 min   Stress: No Stress Concern Present (11/6/2024)    [place] [employer] - Occupational Stress Questionnaire     Feeling of Stress : Only a little   Social Connections: Socially Integrated (3/28/2025)    [facility] from [facility]    Social Connections     Do you often feel lonely or isolated from those around you?: 0   Interpersonal Safety: Low Risk  (6/12/2025)     Interpersonal Safety     Do you feel physically and emotionally safe where you currently live?: Yes     Within the past 12 months, have you been hit, slapped, kicked or otherwise physically hurt by someone?: No     Within the past 12 months, have you been humiliated or emotionally abused in other ways by your partner or ex-partner?: No   Housing Stability: Low Risk  (3/28/2025)    Received from [employer] [facility]    Housing Stability     What is your housing situation today?: 1            Family History:       Family History   Problem Relation Age of Onset    Cancer Mother         OVARIAN,  age 46,48    Other Cancer Mother         Ovarian    Respiratory Father     Cancer Father         Lung    Hypertension Father     Obesity Father     Hyperlipidemia Father     Diabetes Maternal Grandmother     Breast Cancer Maternal Grandmother     Diabetes Maternal Grandfather     Diabetes Paternal Grandmother     Diabetes Paternal Grandfather     Coronary Artery Disease Paternal Grandfather             Medications:     Current Outpatient Medications   Medication Sig Dispense Refill    aspirin (ASPIRIN LOW DOSE) 81 MG EC tablet Take 1 tablet (81 mg) by mouth daily. 90 tablet 2    atorvastatin (LIPITOR) 40 MG tablet TAKE 1 TABLET DAILY 90 tablet 1    Blood Pressure Monitoring (BLOOD PRESSURE DIGITAL SOLN) KIT       clobetasol (TEMOVATE) 0.05 % external ointment Apply topically 2 times daily. 45 g 2    cyclobenzaprine (FLEXERIL) 10 MG tablet Take 0.5-1 tablets (5-10 mg) by mouth 2 times daily as needed for muscle spasms. 90 tablet 3    EPINEPHrine (ANY BX GENERIC EQUIV) 0.3 MG/0.3ML injection 2-pack Inject 0.3 mLs (0.3 mg) into the muscle once as needed for anaphylaxis. 2 each 2    estradiol (ESTRACE) 0.1 MG/GM vaginal cream PLACE 2 GRAMS VAGINALLY 2  TIMES A WEEK 42.5 g 1    omeprazole (PRILOSEC) 40 MG DR capsule Take 1 capsule (40 mg) by mouth daily. 90 capsule 2    Semaglutide-Weight Management  (WEGOVY) 1.7 MG/0.75ML pen Inject 1.7 mg subcutaneously once a week. 3 mL 11    triamcinolone (KENALOG) 0.1 % external cream Apply topically 2 times daily 15 g 0    ZEPBOUND 10 MG/0.5ML prefilled pen Inject 0.5 mLs (10 mg) subcutaneously every 7 days. 6 mL 11    zolpidem (AMBIEN) 5 MG tablet Take tablet by mouth 15 minutes prior to sleep as needed 30 tablet 0     No current facility-administered medications for this visit.              Review of Systems:   A comprehensive 10 point review of systems (constitutional, ENT, cardiac, peripheral vascular, lymphatic, respiratory, GI, , Musculoskeletal, skin, Neurological) was performed and found to be negative except as described in this note.     See intake form completed by patient

## 2025-07-03 ENCOUNTER — PRE VISIT (OUTPATIENT)
Dept: ORTHOPEDICS | Facility: CLINIC | Age: 60
End: 2025-07-03

## 2025-07-05 ASSESSMENT — ACTIVITIES OF DAILY LIVING (ADL)
HOW_WOULD_YOU_RATE_THE_OVERALL_FUNCTION_OF_YOUR_KNEE_DURING_YOUR_USUAL_DAILY_ACTIVITIES?: ABNORMAL
SQUAT: ACTIVITY IS FAIRLY DIFFICULT
KNEEL ON THE FRONT OF YOUR KNEE: ACTIVITY IS SOMEWHAT DIFFICULT
SIT WITH YOUR KNEE BENT: ACTIVITY IS VERY DIFFICULT
STIFFNESS: I DO NOT HAVE THE SYMPTOM
KNEE_ACTIVITY_OF_DAILY_LIVING_SCORE: 65.71
GIVING WAY, BUCKLING OR SHIFTING OF KNEE: I DO NOT HAVE THE SYMPTOM
STAND: ACTIVITY IS MINIMALLY DIFFICULT
GO UP STAIRS: ACTIVITY IS VERY DIFFICULT
SIT WITH YOUR KNEE BENT: ACTIVITY IS VERY DIFFICULT
SWELLING: I DO NOT HAVE THE SYMPTOM
HOW_WOULD_YOU_RATE_THE_CURRENT_FUNCTION_OF_YOUR_KNEE_DURING_YOUR_USUAL_DAILY_ACTIVITIES_ON_A_SCALE_FROM_0_TO_100_WITH_100_BEING_YOUR_LEVEL_OF_KNEE_FUNCTION_PRIOR_TO_YOUR_INJURY_AND_0_BEING_THE_INABILITY_TO_PERFORM_ANY_OF_YOUR_USUAL_DAILY_ACTIVITIES?: 50
HOW_WOULD_YOU_RATE_THE_CURRENT_FUNCTION_OF_YOUR_KNEE_DURING_YOUR_USUAL_DAILY_ACTIVITIES_ON_A_SCALE_FROM_0_TO_100_WITH_100_BEING_YOUR_LEVEL_OF_KNEE_FUNCTION_PRIOR_TO_YOUR_INJURY_AND_0_BEING_THE_INABILITY_TO_PERFORM_ANY_OF_YOUR_USUAL_DAILY_ACTIVITIES?: 50
SQUAT: ACTIVITY IS FAIRLY DIFFICULT
STIFFNESS: I DO NOT HAVE THE SYMPTOM
LIMPING: I HAVE THE SYMPTOM BUT IT DOES NOT AFFECT MY ACTIVITY
GO DOWN STAIRS: ACTIVITY IS FAIRLY DIFFICULT
RAW_SCORE: 46
AS_A_RESULT_OF_YOUR_KNEE_INJURY,_HOW_WOULD_YOU_RATE_YOUR_CURRENT_LEVEL_OF_DAILY_ACTIVITY?: NEARLY NORMAL
WEAKNESS: I DO NOT HAVE THE SYMPTOM
GO DOWN STAIRS: ACTIVITY IS FAIRLY DIFFICULT
SWELLING: I DO NOT HAVE THE SYMPTOM
RISE FROM A CHAIR: ACTIVITY IS MINIMALLY DIFFICULT
PAIN: THE SYMPTOM AFFECTS MY ACTIVITY MODERATELY
GO UP STAIRS: ACTIVITY IS VERY DIFFICULT
PLEASE_INDICATE_YOR_PRIMARY_REASON_FOR_REFERRAL_TO_THERAPY:: KNEE
STAND: ACTIVITY IS MINIMALLY DIFFICULT
RISE FROM A CHAIR: ACTIVITY IS MINIMALLY DIFFICULT
AS_A_RESULT_OF_YOUR_KNEE_INJURY,_HOW_WOULD_YOU_RATE_YOUR_CURRENT_LEVEL_OF_DAILY_ACTIVITY?: NEARLY NORMAL
PAIN: THE SYMPTOM AFFECTS MY ACTIVITY MODERATELY
HOW_WOULD_YOU_RATE_THE_OVERALL_FUNCTION_OF_YOUR_KNEE_DURING_YOUR_USUAL_DAILY_ACTIVITIES?: ABNORMAL
KNEEL ON THE FRONT OF YOUR KNEE: ACTIVITY IS SOMEWHAT DIFFICULT
WALK: ACTIVITY IS SOMEWHAT DIFFICULT
LIMPING: I HAVE THE SYMPTOM BUT IT DOES NOT AFFECT MY ACTIVITY
GIVING WAY, BUCKLING OR SHIFTING OF KNEE: I DO NOT HAVE THE SYMPTOM
WALK: ACTIVITY IS SOMEWHAT DIFFICULT
WEAKNESS: I DO NOT HAVE THE SYMPTOM
KNEE_ACTIVITY_OF_DAILY_LIVING_SUM: 46

## 2025-07-08 ENCOUNTER — THERAPY VISIT (OUTPATIENT)
Dept: PHYSICAL THERAPY | Facility: CLINIC | Age: 60
End: 2025-07-08
Attending: NURSE PRACTITIONER
Payer: COMMERCIAL

## 2025-07-08 DIAGNOSIS — G89.29 CHRONIC PAIN OF LEFT KNEE: ICD-10-CM

## 2025-07-08 DIAGNOSIS — M25.562 CHRONIC PAIN OF LEFT KNEE: ICD-10-CM

## 2025-07-08 PROCEDURE — 97110 THERAPEUTIC EXERCISES: CPT | Mod: GP | Performed by: PHYSICAL THERAPIST

## 2025-07-08 PROCEDURE — 97161 PT EVAL LOW COMPLEX 20 MIN: CPT | Mod: GP | Performed by: PHYSICAL THERAPIST

## 2025-07-08 NOTE — PROGRESS NOTES
PHYSICAL THERAPY EVALUATION  Type of Visit: Evaluation       Fall Risk Screen:  Have you fallen 2 or more times in the past year?: No  Have you fallen and had an injury in the past year?: No  Is patient receiving Physical Therapy Services?: No    Subjective         Presenting condition or subjective complaint: pt reports that her pain began about 4 months ago--she was very sick and had sepsis and was in [facility] hospital for 5 days. She had difficulty after walking after that and as she began to feel better, she began to notice her knee more. Pain is mostly medial and denies any numbness and tingling  Date of onset: 03/08/25    Relevant medical history: Menopause; Migraines or headaches   Dates & types of surgery: endarterectomy,right side    Prior diagnostic imaging/testing results: X-ray     Prior therapy history for the same diagnosis, illness or injury: No          Living Environment  Social support: With a significant other or spouse   Type of home: House; Multi-level   Stairs to enter the home: Yes 1 Is there a railing: No     Ramp: No   Stairs inside the home: Yes 14 Is there a railing: Yes     Help at home: None  Equipment owned:       Employment: No retired   Hobbies/Interests: sewing,knitting ,reading, babysit granddaughters    Patient goals for therapy: walk up stairs,cross my legs    Pain assessment: See objective evaluation for additional pain details     Objective   KNEE EVALUATION  PAIN: Pain Level at Rest: 0/10  Pain Level with Use: 7/10  Pain Location: knee and medial knee joint  Pain Quality: sharp, shooting, stabbing if touched but otherwise a dull ache  Pain Frequency: intermittent or daily  Pain is Worst: activity not time dependent  Pain is Exacerbated By: stairs (mostly up), walking >30 min, crossing legs, pivoting  Pain is Relieved By: rest  Pain Progression: Unchanged    GAIT:  Weightbearing Status: WBAT  Assistive Device(s): None  Gait Deviations: lacks knee ext on left and  heelstrike--slight trunk lean to R    ROM: L knee AROM: 0-4-134 and R knee AROM: 12-0-140    STRENGTH: 3+/5 hip abd on L   FLEXIBILITY: WNL    FUNCTIONAL TESTS: Double Leg Squat: Anterior knee translation, Knee valgus, Hip internal rotation, and Improper use of glutes/hips  PALPATION: tender at medial joint line      Assessment & Plan   CLINICAL IMPRESSIONS  Medical Diagnosis: chronic pain of left knee    Treatment Diagnosis: L knee pain   Impression/Assessment: Patient is a 59 year old female with L knee complaints.  The following significant findings have been identified: Pain, Decreased ROM/flexibility, Decreased joint mobility, Decreased strength, Edema, and Impaired gait. These impairments interfere with their ability to perform self care tasks, work tasks, recreational activities, household chores, and driving  as compared to previous level of function.     Clinical Decision Making (Complexity):  Clinical Presentation: Stable/Uncomplicated  Clinical Presentation Rationale: based on medical and personal factors listed in PT evaluation  Clinical Decision Making (Complexity): Low complexity    PLAN OF CARE  Treatment Interventions:  Interventions: Manual Therapy, Neuromuscular Re-education, Therapeutic Activity, Therapeutic Exercise    Long Term Goals     PT Goal 1  Goal Identifier: walking  Goal Description: improve pain and strength to tolerate walking for 60 min without pain and go up stairs pain free  Rationale: to maximize safety and independence with performance of ADLs and functional tasks;to maximize safety and independence within the home;to maximize safety and independence within the community;to maximize safety and independence with transportation  Target Date: 09/30/25      Frequency of Treatment: 1x/wk for 2 weeks then every other week for 10 week  Duration of Treatment: 12 weeks    Recommended Referrals to Other Professionals: Physical Therapy  Education Assessment:        Risks and benefits of  evaluation/treatment have been explained.   Patient/Family/caregiver agrees with Plan of Care.     Evaluation Time:     PT Killian, Low Complexity Minutes (43548): 15  Evaluation Only     Signing Clinician: Akila Alcaraz PT

## 2025-07-10 ENCOUNTER — OFFICE VISIT (OUTPATIENT)
Dept: ORTHOPEDICS | Facility: CLINIC | Age: 60
End: 2025-07-10
Attending: NURSE PRACTITIONER
Payer: COMMERCIAL

## 2025-07-10 DIAGNOSIS — R22.31 MASS OF RIGHT ELBOW: ICD-10-CM

## 2025-07-10 NOTE — LETTER
7/10/2025      Christina Guzman  [address] [address] [facility] [address]      Dear Colleague,    Thank you for referring your patient, Christina Guzman, to the [facility] [place]. Please see a copy of my visit note below.        [facility], Orthopaedic Oncology Surgery Consultation  by Walker Segura M.D.    Christina Guzman MRN# [id number]    YOB: 1965     Requesting physician: Jessica Barlow            Assessment and Plan:   Assessment:  Soft tissue calcifications, subcutaneous tissue adjacent to right olecranon region.  Stable over 1 year.  Unlikely represent any type of neoplasm.  Differential diagnosis includes etiology related to blunt trauma or overuse trauma, inflammatory conditions, endocrinopathy or renal disorders and less likely neoplastic.      Plan:  Patient's symptoms are mainly associated with focal tenderness over the subcutaneous tissue adjacent to olecranon.  While surgical excision certainly is reasonable and possible, she will have a soft tissue defect in this area that may also aggravate her symptoms with lack of any subcutaneous tissue and cushion in the area.  Therefore I recommended nonsurgical treatment initially with use of a neoprene sleeve or tennis elbow type strap brace placing the cushion portion of the appliance directly over the olecranon region.  If there has been enlargement or progressive growth over the next 6 to 12 months, she will return and we will then proceed with surgical excision and tissue sampling.      MD Keerthi Lopez Family Professor  Oncology and Adult Reconstructive Surgery  Dept Orthopaedic Surgery, [facility]  [phone] office, [phone] pager  [email or web address]    This note was created using dictation software and may contain errors.  Please contact the creator for any clarifications that are needed.            History of Present Illness:   59 year old  female  chief complaint    This patient presents with discomfort involving her right elbow mainly when she rested upon areas and has focal tenderness directly over the olecranon and adjacent muscle tissue in an area where there are calcifications within the soft tissue demonstrated radiographically and on MR imaging.    She denies any trauma to the area and has no other areas of calcifications.  She denies any history of renal disease or endocrine disorder.      Current symptoms:  Problem: Right elbow mass  Onset and duration: chronic  Awakens from sleep due to sx's:  No  Precipitating Injury:  No    Other joints or sites painful:  Yes  Fever: No  Appetite change or weight loss: No  History of prior or existing cancer: No    Background history:      TREATMENTS:  12/2/2014, EXCISION BONE SPUR LOWER EXTREMITY interphalangeal joint left hallux, (MICKEY Soares), [facility]   date, treatment, (surgeon), hospital             Physical Exam:     EXAMINATION pertinent findings:   PSYCH: Pleasant, healthy-appearing, alert, oriented x3, cooperative. Normal mood and affect.  VITAL SIGNS: Last menstrual period 05/25/2018, not currently breastfeeding..  Reviewed nursing intake notes.   There is no height or weight on file to calculate BMI.  RESP: non labored breathing   ABD: benign, soft, non-tender, no acute peritoneal findings  SKIN: grossly normal   LYMPHATIC: grossly normal, no adenopathy, no extremity edema  NEURO: grossly normal , no motor deficits  VASCULAR: satisfactory perfusion of all extremities   MUSCULOSKELETAL:   Tenderness directly over the muscular tendinous junction of her forearm musculature and the olecranon region.  Additional soft tissue calcifications within the subcutaneous tissue just distal to the olecranon.  No drainage.  No erythema.  Multiple densities appreciated on palpation.  Range of motion the elbow is normal.  No evidence of tendinitis over the triceps tendon.             Data:   All laboratory  data reviewed  All imaging studies reviewed by me    Radiographs and MRI imaging reviewed.    DATA for DOCUMENTATION:         Past Medical History:     Patient Active Problem List   Diagnosis     Chronic idiopathic urticaria     House dust mite allergy     Depression     Vitamin D deficiency     GERD (gastroesophageal reflux disease)     Low back pain, unspecified back pain laterality, unspecified chronicity, with sciatica presence unspecified     H/O carotid endarterectomy     History of TIA (transient ischemic attack)     Vaginal dryness     Lichen sclerosus     Diverticulosis of large intestine without hemorrhage     Class 2 severe obesity due to excess calories with serious comorbidity in adult (H)     Diaphragmatic hernia     Irritable bowel syndrome with diarrhea     History of migraine     Chronic insomnia     Restless legs syndrome (RLS)     FABBY (obstructive sleep apnea) - moderate (AHI 18)     Past Medical History:   Diagnosis Date     Carotid stenosis 09/25/2018     Fructose disorder     Fructose Malabsorbtion     Menopausal depression 12/11/2012     Menopausal syndrome 12/11/2012     Migraines      TIA (transient ischemic attack) 10/05/2018       Also see scanned health assessment forms.       Past Surgical History:     Past Surgical History:   Procedure Laterality Date     CAROTID ENDARTERECTOMY  2018     ENT SURGERY  04/01/2017    Oral surgery     ORTHOPEDIC SURGERY  12/2014    bone spur shaved from left big toe     TOE SURGERY      Left foot     ZZC ORAL SURGERY PROCEDURE  04/03/2017    Cyst removed from abscess in tooth            Social History:     Social History     Socioeconomic History     Marital status:      Spouse name: Not on file     Number of children: Not on file     Years of education: Not on file     Highest education level: Not on file   Occupational History     Not on file   Tobacco Use     Smoking status: Former     Current packs/day: 0.00     Average packs/day: 0.5 packs/day  for 30.0 years (15.0 ttl pk-yrs)     Types: Cigarettes     Start date:      Quit date: 2018     Years since quittin.5     Passive exposure: Past     Smokeless tobacco: Never   Vaping Use     Vaping status: Never Used   Substance and Sexual Activity     Alcohol use: No     Comment: occ     Drug use: No     Sexual activity: Yes     Partners: Male     Birth control/protection: Post-menopausal, Male Surgical     Comment:  Vasectomy   Other Topics Concern     Parent/sibling w/ CABG, MI or angioplasty before 65F 55M? No      Service No     Blood Transfusions No     Caffeine Concern No     Occupational Exposure No     Hobby Hazards No     Sleep Concern No     Stress Concern No     Weight Concern Yes     Special Diet No     Back Care No     Exercise Yes     Bike Helmet No     Seat Belt Yes     Self-Exams Yes   Social History Narrative     Not on file     Social Drivers of Health     Financial Resource Strain: Low Risk  (3/28/2025)    Received from [facility]    Financial Resource Strain      Difficulty of Paying Living Expenses: 3      Difficulty of Paying Living Expenses: Not on file   Food Insecurity: No Food Insecurity (3/28/2025)    Received from [facility]    Food Insecurity      Do you worry your food will run out before you are able to buy more?: 1   Transportation Needs: No Transportation Needs (3/28/2025)    Received from [employer]    Transportation Needs      Does lack of transportation keep you from medical appointments?: 1      Does lack of transportation keep you from work, meetings or getting things that you need?: 1   Physical Activity: Insufficiently Active (2024)    Exercise Vital Sign      Days of Exercise per Week: 1 day      Minutes of Exercise per Session: 30 min   Stress: No Stress Concern Present (2024)    [employer] - Occupational Stress  Questionnaire      Feeling of Stress : Only a little   Social Connections: Socially Integrated (3/28/2025)    Received from [employer]    Social Connections      Do you often feel lonely or isolated from those around you?: 0   Interpersonal Safety: Low Risk  (2025)    Interpersonal Safety      Do you feel physically and emotionally safe where you currently live?: Yes      Within the past 12 months, have you been hit, slapped, kicked or otherwise physically hurt by someone?: No      Within the past 12 months, have you been humiliated or emotionally abused in other ways by your partner or ex-partner?: No   Housing Stability: Low Risk  (3/28/2025)    Received from [employer]    Housing Stability      What is your housing situation today?: 1            Family History:       Family History   Problem Relation Age of Onset     Cancer Mother         OVARIAN,  age 46,48     Other Cancer Mother         Ovarian     Respiratory Father      Cancer Father         Lung     Hypertension Father      Obesity Father      Hyperlipidemia Father      Diabetes Maternal Grandmother      Breast Cancer Maternal Grandmother      Diabetes Maternal Grandfather      Diabetes Paternal Grandmother      Diabetes Paternal Grandfather      Coronary Artery Disease Paternal Grandfather             Medications:     Current Outpatient Medications   Medication Sig Dispense Refill     aspirin (ASPIRIN LOW DOSE) 81 MG EC tablet Take 1 tablet (81 mg) by mouth daily. 90 tablet 2     atorvastatin (LIPITOR) 40 MG tablet TAKE 1 TABLET DAILY 90 tablet 1     Blood Pressure Monitoring (BLOOD PRESSURE DIGITAL SOLN) KIT        clobetasol (TEMOVATE) 0.05 % external ointment Apply topically 2 times daily. 45 g 2     cyclobenzaprine (FLEXERIL) 10 MG tablet Take 0.5-1 tablets (5-10 mg) by mouth 2 times daily as needed for muscle spasms. 90 tablet 3     EPINEPHrine (ANY BX GENERIC EQUIV) 0.3  MG/0.3ML injection 2-pack Inject 0.3 mLs (0.3 mg) into the muscle once as needed for anaphylaxis. 2 each 2     estradiol (ESTRACE) 0.1 MG/GM vaginal cream PLACE 2 GRAMS VAGINALLY 2  TIMES A WEEK 42.5 g 1     omeprazole (PRILOSEC) 40 MG DR capsule Take 1 capsule (40 mg) by mouth daily. 90 capsule 2     Semaglutide-Weight Management (WEGOVY) 1.7 MG/0.75ML pen Inject 1.7 mg subcutaneously once a week. 3 mL 11     triamcinolone (KENALOG) 0.1 % external cream Apply topically 2 times daily 15 g 0     ZEPBOUND 10 MG/0.5ML prefilled pen Inject 0.5 mLs (10 mg) subcutaneously every 7 days. 6 mL 11     zolpidem (AMBIEN) 5 MG tablet Take tablet by mouth 15 minutes prior to sleep as needed 30 tablet 0     No current facility-administered medications for this visit.              Review of Systems:   A comprehensive 10 point review of systems (constitutional, ENT, cardiac, peripheral vascular, lymphatic, respiratory, GI, , Musculoskeletal, skin, Neurological) was performed and found to be negative except as described in this note.     See intake form completed by patient      Again, thank you for allowing me to participate in the care of your patient.        Sincerely,        Walker Segura MD    Electronically signed

## 2025-07-12 ENCOUNTER — TELEPHONE (OUTPATIENT)
Dept: ENDOCRINOLOGY | Facility: CLINIC | Age: 60
End: 2025-07-12
Payer: COMMERCIAL

## 2025-07-12 NOTE — TELEPHONE ENCOUNTER
Left Voicemail (1st Attempt) and Sent [facility] (1st Attempt) for the patient to call back and schedule the following:    Appointment type: Return Weight Management  Appointment mode: Virtual Visit  Provider: Dr. Rene Cruz  Return date: Approx. 12/23/25  Specialty phone number: [phone]

## 2025-07-21 ENCOUNTER — OFFICE VISIT (OUTPATIENT)
Dept: URGENT CARE | Facility: URGENT CARE | Age: 60
End: 2025-07-21
Payer: COMMERCIAL

## 2025-07-21 VITALS
HEART RATE: 88 BPM | WEIGHT: 180 LBS | HEIGHT: 64 IN | BODY MASS INDEX: 30.73 KG/M2 | OXYGEN SATURATION: 99 % | SYSTOLIC BLOOD PRESSURE: 118 MMHG | DIASTOLIC BLOOD PRESSURE: 80 MMHG | TEMPERATURE: 98.2 F | RESPIRATION RATE: 16 BRPM

## 2025-07-21 DIAGNOSIS — R10.30 LOWER ABDOMINAL PAIN: ICD-10-CM

## 2025-07-21 DIAGNOSIS — R11.0 NAUSEA: Primary | ICD-10-CM

## 2025-07-21 LAB
ALBUMIN UR-MCNC: NEGATIVE MG/DL
APPEARANCE UR: ABNORMAL
BASOPHILS # BLD AUTO: 0.1 10E3/UL (ref 0–0.2)
BASOPHILS NFR BLD AUTO: 1 %
BILIRUB UR QL STRIP: NEGATIVE
COLOR UR AUTO: YELLOW
EOSINOPHIL # BLD AUTO: 0.7 10E3/UL (ref 0–0.7)
EOSINOPHIL NFR BLD AUTO: 6 %
ERYTHROCYTE [DISTWIDTH] IN BLOOD BY AUTOMATED COUNT: 14.1 % (ref 10–15)
GLUCOSE UR STRIP-MCNC: NEGATIVE MG/DL
HCT VFR BLD AUTO: 40.5 % (ref 35–47)
HGB BLD-MCNC: 13.2 G/DL (ref 11.7–15.7)
HGB UR QL STRIP: NEGATIVE
IMM GRANULOCYTES # BLD: 0 10E3/UL
IMM GRANULOCYTES NFR BLD: 0 %
KETONES UR STRIP-MCNC: ABNORMAL MG/DL
LEUKOCYTE ESTERASE UR QL STRIP: NEGATIVE
LYMPHOCYTES # BLD AUTO: 3.1 10E3/UL (ref 0.8–5.3)
LYMPHOCYTES NFR BLD AUTO: 28 %
MCH RBC QN AUTO: 26.6 PG (ref 26.5–33)
MCHC RBC AUTO-ENTMCNC: 32.6 G/DL (ref 31.5–36.5)
MCV RBC AUTO: 82 FL (ref 78–100)
MONOCYTES # BLD AUTO: 0.7 10E3/UL (ref 0–1.3)
MONOCYTES NFR BLD AUTO: 7 %
MUCOUS THREADS #/AREA URNS LPF: PRESENT /LPF
NEUTROPHILS # BLD AUTO: 6.5 10E3/UL (ref 1.6–8.3)
NEUTROPHILS NFR BLD AUTO: 59 %
NITRATE UR QL: NEGATIVE
PH UR STRIP: 5.5 [PH] (ref 5–7)
PLATELET # BLD AUTO: 310 10E3/UL (ref 150–450)
RBC # BLD AUTO: 4.96 10E6/UL (ref 3.8–5.2)
RBC #/AREA URNS AUTO: ABNORMAL /HPF
SP GR UR STRIP: 1.02 (ref 1–1.03)
SQUAMOUS #/AREA URNS AUTO: ABNORMAL /LPF
UROBILINOGEN UR STRIP-ACNC: 0.2 E.U./DL
WBC # BLD AUTO: 11 10E3/UL (ref 4–11)
WBC #/AREA URNS AUTO: ABNORMAL /HPF

## 2025-07-21 PROCEDURE — 1125F AMNT PAIN NOTED PAIN PRSNT: CPT | Performed by: FAMILY MEDICINE

## 2025-07-21 PROCEDURE — 99214 OFFICE O/P EST MOD 30 MIN: CPT | Performed by: FAMILY MEDICINE

## 2025-07-21 PROCEDURE — 81001 URINALYSIS AUTO W/SCOPE: CPT | Performed by: FAMILY MEDICINE

## 2025-07-21 PROCEDURE — 85025 COMPLETE CBC W/AUTO DIFF WBC: CPT | Performed by: FAMILY MEDICINE

## 2025-07-21 PROCEDURE — 36415 COLL VENOUS BLD VENIPUNCTURE: CPT | Performed by: FAMILY MEDICINE

## 2025-07-21 PROCEDURE — 3079F DIAST BP 80-89 MM HG: CPT | Performed by: FAMILY MEDICINE

## 2025-07-21 PROCEDURE — 3074F SYST BP LT 130 MM HG: CPT | Performed by: FAMILY MEDICINE

## 2025-07-21 ASSESSMENT — PAIN SCALES - GENERAL: PAINLEVEL_OUTOF10: MODERATE PAIN (5)

## 2025-07-21 NOTE — PROGRESS NOTES
Urgent Care Clinic Visit    Chief Complaint   Patient presents with    Urgent Care    Frequency     X's 3 days of feeling sick, tired with nausea no vomiting no fever                 7/21/2025     2:00 PM   Additional Questions   Roomed by ca   Accompanied by self         (R11.0) Nausea  (primary encounter diagnosis)  Comment:   Plan: UA Macroscopic with reflex to Microscopic and         Culture - Lab Collect, UA Microscopic with         Reflex to Culture            (R10.30) Lower abdominal pain  Comment:     This was her primary concern.  Exam shows only minimal tenderness.  WBC and UA are WNL.  Her history of urosepsis is noted.    Plan: CBC with platelets and differential, Urine         Culture Aerobic Bacterial - lab collect    Patient was advised to return if symptoms persist or if they worsen and she is agreeable with this plan.      CHIEF COMPLAINT    Lower abdominal discomfort, history of urosepsis.      HISTORY    This is a 60-year-old woman who has a 4 to 5-day history of lower abdominal pain, not to well localized.  This has been present for 4 to 5 days.  She has had occasional nausea but no vomiting and no diarrhea.  She has not noticed a fever.    She did want to rule out a UTI having had urosepsis back in March requiring a 5-day hospital stay.        REVIEW OF SYSTEMS    No fever or chills.  No sore throat.  No cough or SOB.  No chest pain.  No upper abdominal pain.  No dysuria or frequency.  No rash.      Patient Active Problem List   Diagnosis    Chronic idiopathic urticaria    House dust mite allergy    Depression    Vitamin D deficiency    GERD (gastroesophageal reflux disease)    Low back pain, unspecified back pain laterality, unspecified chronicity, with sciatica presence unspecified    H/O carotid endarterectomy    History of TIA (transient ischemic attack)    Vaginal dryness    Lichen sclerosus    Diverticulosis of large intestine without hemorrhage    Class 2 severe obesity due to excess  "calories with serious comorbidity in adult (H)    Diaphragmatic hernia    Irritable bowel syndrome with diarrhea    History of migraine    Chronic insomnia    Restless legs syndrome (RLS)    FABBY (obstructive sleep apnea) - moderate (AHI 18)    Chronic pain of left knee       Current Outpatient Medications   Medication Sig Dispense Refill    aspirin (ASPIRIN LOW DOSE) 81 MG EC tablet Take 1 tablet (81 mg) by mouth daily. 90 tablet 2    atorvastatin (LIPITOR) 40 MG tablet TAKE 1 TABLET DAILY 90 tablet 1    Blood Pressure Monitoring (BLOOD PRESSURE DIGITAL SOLN) KIT       clobetasol (TEMOVATE) 0.05 % external ointment Apply topically 2 times daily. 45 g 2    EPINEPHrine (ANY BX GENERIC EQUIV) 0.3 MG/0.3ML injection 2-pack Inject 0.3 mLs (0.3 mg) into the muscle once as needed for anaphylaxis. 2 each 2    estradiol (ESTRACE) 0.1 MG/GM vaginal cream PLACE 2 GRAMS VAGINALLY 2  TIMES A WEEK 42.5 g 1    omeprazole (PRILOSEC) 40 MG DR capsule Take 1 capsule (40 mg) by mouth daily. 90 capsule 2    Semaglutide-Weight Management (WEGOVY) 1.7 MG/0.75ML pen Inject 1.7 mg subcutaneously once a week. 3 mL 11    triamcinolone (KENALOG) 0.1 % external cream Apply topically 2 times daily 15 g 0    cyclobenzaprine (FLEXERIL) 10 MG tablet Take 0.5-1 tablets (5-10 mg) by mouth 2 times daily as needed for muscle spasms. 90 tablet 3         EXAM  /80   Pulse 88   Temp 98.2  F (36.8  C) (Tympanic)   Resp 16   Ht 1.626 m (5' 4\")   Wt 81.6 kg (180 lb)   LMP 05/25/2018 (Exact Date)   SpO2 99%   BMI 30.90 kg/m      Pleasant woman, NAD.  HEENT unremarkable.  Neck without obvious mass.  No cough or labored breathing.  Abdomen nondistended, normal bowel sounds are present, minimal tenderness in the lower abdomen, nonlocalized, no mass or guarding.  Skin unremarkable.  Ambulates comfortably.      Results for orders placed or performed in visit on 07/21/25   UA Macroscopic with reflex to Microscopic and Culture - Lab Collect     " Status: Abnormal    Specimen: Urine, Clean Catch   Result Value Ref Range    Color Urine Yellow Colorless, Straw, Light Yellow, Yellow    Appearance Urine Slightly Cloudy (A) Clear    Glucose Urine Negative Negative mg/dL    Bilirubin Urine Negative Negative    Ketones Urine Trace (A) Negative mg/dL    Specific Gravity Urine 1.020 1.003 - 1.035    Blood Urine Negative Negative    pH Urine 5.5 5.0 - 7.0    Protein Albumin Urine Negative Negative mg/dL    Urobilinogen Urine 0.2 0.2, 1.0 E.U./dL    Nitrite Urine Negative Negative    Leukocyte Esterase Urine Negative Negative   UA Microscopic with Reflex to Culture     Status: Abnormal   Result Value Ref Range    RBC Urine 0-2 0-2 /HPF /HPF    WBC Urine 0-5 0-5 /HPF /HPF    Squamous Epithelials Urine Moderate (A) None Seen /LPF    Mucus Urine Present (A) None Seen /LPF    Narrative    Urine Culture not indicated   CBC with platelets and differential     Status: None   Result Value Ref Range    WBC Count 11.0 4.0 - 11.0 10e3/uL    RBC Count 4.96 3.80 - 5.20 10e6/uL    Hemoglobin 13.2 11.7 - 15.7 g/dL    Hematocrit 40.5 35.0 - 47.0 %    MCV 82 78 - 100 fL    MCH 26.6 26.5 - 33.0 pg    MCHC 32.6 31.5 - 36.5 g/dL    RDW 14.1 10.0 - 15.0 %    Platelet Count 310 150 - 450 10e3/uL    % Neutrophils 59 %    % Lymphocytes 28 %    % Monocytes 7 %    % Eosinophils 6 %    % Basophils 1 %    % Immature Granulocytes 0 %    Absolute Neutrophils 6.5 1.6 - 8.3 10e3/uL    Absolute Lymphocytes 3.1 0.8 - 5.3 10e3/uL    Absolute Monocytes 0.7 0.0 - 1.3 10e3/uL    Absolute Eosinophils 0.7 0.0 - 0.7 10e3/uL    Absolute Basophils 0.1 0.0 - 0.2 10e3/uL    Absolute Immature Granulocytes 0.0 <=0.4 10e3/uL   CBC with platelets and differential     Status: None    Narrative    The following orders were created for panel order CBC with platelets and differential.  Procedure                               Abnormality         Status                     ---------                               -----------          ------                     CBC with platelets and ...[[id number]]                      Final result                 Please view results for these tests on the individual orders.